# Patient Record
Sex: FEMALE | Race: WHITE | NOT HISPANIC OR LATINO | Employment: OTHER | URBAN - METROPOLITAN AREA
[De-identification: names, ages, dates, MRNs, and addresses within clinical notes are randomized per-mention and may not be internally consistent; named-entity substitution may affect disease eponyms.]

---

## 2023-03-10 DIAGNOSIS — G89.29 CHRONIC PAIN OF RIGHT KNEE: ICD-10-CM

## 2023-03-10 DIAGNOSIS — I10 PRIMARY HYPERTENSION: Primary | ICD-10-CM

## 2023-03-10 DIAGNOSIS — M25.561 CHRONIC PAIN OF RIGHT KNEE: ICD-10-CM

## 2023-03-10 DIAGNOSIS — M19.041 PRIMARY OSTEOARTHRITIS OF BOTH HANDS: ICD-10-CM

## 2023-03-10 DIAGNOSIS — M19.042 PRIMARY OSTEOARTHRITIS OF BOTH HANDS: ICD-10-CM

## 2023-03-11 PROBLEM — E55.9 VITAMIN D DEFICIENCY: Status: ACTIVE | Noted: 2023-03-11

## 2023-03-11 PROBLEM — R50.9 FEVER: Status: ACTIVE | Noted: 2023-03-11

## 2023-03-11 PROBLEM — I10 HTN (HYPERTENSION): Status: ACTIVE | Noted: 2023-03-11

## 2023-03-11 PROBLEM — M19.042 OSTEOARTHRITIS OF HANDS, BILATERAL: Status: ACTIVE | Noted: 2023-03-11

## 2023-03-11 PROBLEM — E11.40 CONTROLLED TYPE 2 DIABETES MELLITUS WITH DIABETIC NEUROPATHY, WITHOUT LONG-TERM CURRENT USE OF INSULIN (MULTI): Status: ACTIVE | Noted: 2023-03-11

## 2023-03-11 PROBLEM — E78.5 HYPERLIPIDEMIA ASSOCIATED WITH TYPE 2 DIABETES MELLITUS (MULTI): Status: ACTIVE | Noted: 2023-03-11

## 2023-03-11 PROBLEM — I25.10 CORONARY ARTERY DISEASE INVOLVING NATIVE CORONARY ARTERY OF NATIVE HEART WITHOUT ANGINA PECTORIS: Status: ACTIVE | Noted: 2023-03-11

## 2023-03-11 PROBLEM — E11.69 HYPERLIPIDEMIA ASSOCIATED WITH TYPE 2 DIABETES MELLITUS (MULTI): Status: ACTIVE | Noted: 2023-03-11

## 2023-03-11 PROBLEM — M25.561 RIGHT KNEE PAIN: Status: ACTIVE | Noted: 2023-03-11

## 2023-03-11 PROBLEM — M79.7 FIBROMYALGIA: Status: ACTIVE | Noted: 2023-03-11

## 2023-03-11 PROBLEM — T45.1X5A NEUROPATHY DUE TO CHEMOTHERAPEUTIC DRUG (MULTI): Status: ACTIVE | Noted: 2023-03-11

## 2023-03-11 PROBLEM — E87.5 HYPERKALEMIA: Status: ACTIVE | Noted: 2023-03-11

## 2023-03-11 PROBLEM — M19.041 OSTEOARTHRITIS OF HANDS, BILATERAL: Status: ACTIVE | Noted: 2023-03-11

## 2023-03-11 PROBLEM — R06.2 WHEEZING: Status: ACTIVE | Noted: 2023-03-11

## 2023-03-11 PROBLEM — G62.0 NEUROPATHY DUE TO CHEMOTHERAPEUTIC DRUG (MULTI): Status: ACTIVE | Noted: 2023-03-11

## 2023-03-11 RX ORDER — BISOPROLOL FUMARATE AND HYDROCHLOROTHIAZIDE 2.5; 6.25 MG/1; MG/1
TABLET ORAL
Qty: 30 TABLET | Refills: 0 | Status: SHIPPED | OUTPATIENT
Start: 2023-03-11 | End: 2023-04-07 | Stop reason: SDUPTHER

## 2023-03-11 RX ORDER — TIZANIDINE 4 MG/1
TABLET ORAL
Qty: 180 TABLET | Refills: 0 | Status: SHIPPED | OUTPATIENT
Start: 2023-03-11 | End: 2023-04-07 | Stop reason: SDUPTHER

## 2023-03-13 DIAGNOSIS — I10 PRIMARY HYPERTENSION: ICD-10-CM

## 2023-03-13 DIAGNOSIS — I10 HYPERTENSION, UNSPECIFIED TYPE: ICD-10-CM

## 2023-03-13 RX ORDER — BISOPROLOL FUMARATE AND HYDROCHLOROTHIAZIDE 2.5; 6.25 MG/1; MG/1
1 TABLET ORAL DAILY
Qty: 30 TABLET | Refills: 0 | Status: CANCELLED | OUTPATIENT
Start: 2023-03-13

## 2023-03-13 RX ORDER — GABAPENTIN 300 MG/1
300 CAPSULE ORAL
COMMUNITY
Start: 2023-03-06 | End: 2023-04-07 | Stop reason: SDUPTHER

## 2023-03-13 RX ORDER — LISINOPRIL 10 MG/1
10 TABLET ORAL DAILY
Qty: 30 TABLET | Refills: 11 | Status: SHIPPED | OUTPATIENT
Start: 2023-03-13 | End: 2023-07-10 | Stop reason: SDUPTHER

## 2023-03-13 RX ORDER — GLIMEPIRIDE 2 MG/1
2 TABLET ORAL
COMMUNITY
Start: 2023-03-08 | End: 2023-04-07 | Stop reason: SDUPTHER

## 2023-04-07 ENCOUNTER — TELEPHONE (OUTPATIENT)
Dept: PRIMARY CARE | Facility: CLINIC | Age: 73
End: 2023-04-07
Payer: COMMERCIAL

## 2023-04-07 DIAGNOSIS — G89.29 CHRONIC PAIN OF RIGHT KNEE: ICD-10-CM

## 2023-04-07 DIAGNOSIS — E11.40 CONTROLLED TYPE 2 DIABETES MELLITUS WITH DIABETIC NEUROPATHY, WITHOUT LONG-TERM CURRENT USE OF INSULIN (MULTI): Primary | ICD-10-CM

## 2023-04-07 DIAGNOSIS — M19.042 PRIMARY OSTEOARTHRITIS OF BOTH HANDS: ICD-10-CM

## 2023-04-07 DIAGNOSIS — M25.561 CHRONIC PAIN OF RIGHT KNEE: ICD-10-CM

## 2023-04-07 DIAGNOSIS — I10 PRIMARY HYPERTENSION: ICD-10-CM

## 2023-04-07 DIAGNOSIS — M19.041 PRIMARY OSTEOARTHRITIS OF BOTH HANDS: ICD-10-CM

## 2023-04-07 RX ORDER — TIZANIDINE 4 MG/1
TABLET ORAL
Qty: 180 TABLET | Refills: 0 | Status: SHIPPED | OUTPATIENT
Start: 2023-04-07 | End: 2023-05-30

## 2023-04-07 RX ORDER — BISOPROLOL FUMARATE AND HYDROCHLOROTHIAZIDE 2.5; 6.25 MG/1; MG/1
1 TABLET ORAL DAILY
Qty: 90 TABLET | Refills: 2 | Status: SHIPPED | OUTPATIENT
Start: 2023-04-07 | End: 2023-12-20 | Stop reason: SDUPTHER

## 2023-04-07 RX ORDER — GLIMEPIRIDE 2 MG/1
TABLET ORAL
Qty: 60 TABLET | Refills: 0 | Status: SHIPPED | OUTPATIENT
Start: 2023-04-07 | End: 2023-05-30

## 2023-04-07 RX ORDER — GABAPENTIN 300 MG/1
CAPSULE ORAL
Qty: 90 CAPSULE | Refills: 0 | Status: SHIPPED | OUTPATIENT
Start: 2023-04-07 | End: 2023-05-18

## 2023-04-07 NOTE — TELEPHONE ENCOUNTER
Rx Refill Request Telephone Encounter    Name:  Antoine Alemanroderick  :  [id number]  Medication Name:  bisoprolol  2.5mg          Specific Pharmacy location:  [facility]

## 2023-06-05 ENCOUNTER — PATIENT OUTREACH (OUTPATIENT)
Dept: PRIMARY CARE | Facility: CLINIC | Age: 73
End: 2023-06-05
Payer: COMMERCIAL

## 2023-06-05 RX ORDER — MORPHINE SULFATE 30 MG/1
TABLET, FILM COATED, EXTENDED RELEASE ORAL EVERY 8 HOURS
COMMUNITY
Start: 2023-05-24 | End: 2023-06-22

## 2023-06-05 RX ORDER — SIMVASTATIN 40 MG/1
TABLET, FILM COATED ORAL
COMMUNITY
End: 2023-07-10

## 2023-06-05 RX ORDER — AMOXICILLIN AND CLAVULANATE POTASSIUM 875; 125 MG/1; MG/1
TABLET, FILM COATED ORAL 2 TIMES DAILY
COMMUNITY
Start: 2023-06-02 | End: 2023-06-07 | Stop reason: ALTCHOICE

## 2023-06-05 RX ORDER — OXYCODONE HYDROCHLORIDE 10 MG/1
10-20 TABLET ORAL EVERY 6 HOURS PRN
COMMUNITY
Start: 2023-05-03 | End: 2023-11-09 | Stop reason: SDUPTHER

## 2023-06-05 RX ORDER — METRONIDAZOLE 500 MG/1
TABLET ORAL
COMMUNITY
Start: 2023-06-02 | End: 2023-06-07 | Stop reason: ALTCHOICE

## 2023-06-05 RX ORDER — BLOOD-GLUCOSE METER
1 KIT MISCELLANEOUS 2 TIMES DAILY
COMMUNITY
Start: 2023-05-28 | End: 2023-09-19

## 2023-06-05 NOTE — PROGRESS NOTES
Discharge Facility: [facility]   Discharge Diagnosis:  Final Discharge Diagnoses: Cholelithiasis  Acute diverticulitis  UTI (urinary tract infection)     Admission Date:5-30-23  Discharge Date:  6-2-23    PCP Appointment Date: 6-7-23  Specialist Appointment Date:   6-5-23 @ 1300    Physician/Dept/Service:   Deanna Gar MD          Reason for Referral:   Follow-up of gallstones          Location:   [facility]  [address] [place] [address];   [address]  [place] [address]  Hospital Encounter and Summary: Linked   See discharge assessment below for further details  Engagement  Call Start Time: 0924 (6/5/2023  9:25 AM)    Medications  Medications reviewed with patient/caregiver?: Yes (6/5/2023  9:25 AM)  Is the patient having any side effects they believe may be caused by any medication additions or changes?: No (6/5/2023  9:25 AM)  Does the patient have all medications ordered at discharge?: Yes (6/5/2023  9:25 AM)  Care Management Interventions: No intervention needed (6/5/2023  9:25 AM)  Is the patient taking all medications as directed (includes completed medication regime)?: Yes (6/5/2023  9:25 AM)  Care Management Interventions: Provided patient education (6/5/2023  9:25 AM)    Appointments  Does the patient have a primary care provider?: Yes (6/5/2023  9:25 AM)  Care Management Interventions: Verified appointment date/time/provider (6/5/2023  9:25 AM)  Has the patient kept scheduled appointments due by today?: Yes (6/5/2023  9:25 AM)  Care Management Interventions: Advised patient to keep appointment (6/5/2023  9:25 AM)    Self Management  Has home health visited the patient within 72 hours of discharge?: Not applicable (6/5/2023  9:25 AM)    Patient Teaching  Does the patient have access to their discharge instructions?: Yes (6/5/2023  9:25 AM)  Care Management Interventions: Reviewed instructions with patient (6/5/2023  9:25 AM)  What is the patient's perception of their health  status since discharge?: Improving (6/5/2023  9:25 AM)  Is the patient/caregiver able to teach back the hierarchy of who to call/visit for symptoms/problems? PCP, Specialist, Home Health nurse, Urgent Care, ED, 911: Yes (6/5/2023  9:25 AM)      Damian Lawrence LPN

## 2023-06-07 ENCOUNTER — OFFICE VISIT (OUTPATIENT)
Dept: PRIMARY CARE | Facility: CLINIC | Age: 73
End: 2023-06-07
Payer: MEDICARE

## 2023-06-07 VITALS
SYSTOLIC BLOOD PRESSURE: 102 MMHG | OXYGEN SATURATION: 93 % | BODY MASS INDEX: 33.52 KG/M2 | DIASTOLIC BLOOD PRESSURE: 60 MMHG | RESPIRATION RATE: 18 BRPM | HEIGHT: 71 IN | WEIGHT: 239.4 LBS | HEART RATE: 74 BPM

## 2023-06-07 DIAGNOSIS — R10.11 RIGHT UPPER QUADRANT ABDOMINAL PAIN: ICD-10-CM

## 2023-06-07 DIAGNOSIS — B02.9 HERPES ZOSTER WITHOUT COMPLICATION: Primary | ICD-10-CM

## 2023-06-07 DIAGNOSIS — I10 PRIMARY HYPERTENSION: ICD-10-CM

## 2023-06-07 DIAGNOSIS — E11.40 CONTROLLED TYPE 2 DIABETES MELLITUS WITH DIABETIC NEUROPATHY, WITHOUT LONG-TERM CURRENT USE OF INSULIN (MULTI): ICD-10-CM

## 2023-06-07 PROCEDURE — 99496 TRANSJ CARE MGMT HIGH F2F 7D: CPT | Performed by: STUDENT IN AN ORGANIZED HEALTH CARE EDUCATION/TRAINING PROGRAM

## 2023-06-07 PROCEDURE — 1160F RVW MEDS BY RX/DR IN RCRD: CPT | Performed by: STUDENT IN AN ORGANIZED HEALTH CARE EDUCATION/TRAINING PROGRAM

## 2023-06-07 PROCEDURE — 4010F ACE/ARB THERAPY RXD/TAKEN: CPT | Performed by: STUDENT IN AN ORGANIZED HEALTH CARE EDUCATION/TRAINING PROGRAM

## 2023-06-07 PROCEDURE — 3074F SYST BP LT 130 MM HG: CPT | Performed by: STUDENT IN AN ORGANIZED HEALTH CARE EDUCATION/TRAINING PROGRAM

## 2023-06-07 PROCEDURE — 1170F FXNL STATUS ASSESSED: CPT | Performed by: STUDENT IN AN ORGANIZED HEALTH CARE EDUCATION/TRAINING PROGRAM

## 2023-06-07 PROCEDURE — 1159F MED LIST DOCD IN RCRD: CPT | Performed by: STUDENT IN AN ORGANIZED HEALTH CARE EDUCATION/TRAINING PROGRAM

## 2023-06-07 PROCEDURE — 1157F ADVNC CARE PLAN IN RCRD: CPT | Performed by: STUDENT IN AN ORGANIZED HEALTH CARE EDUCATION/TRAINING PROGRAM

## 2023-06-07 PROCEDURE — 1036F TOBACCO NON-USER: CPT | Performed by: STUDENT IN AN ORGANIZED HEALTH CARE EDUCATION/TRAINING PROGRAM

## 2023-06-07 PROCEDURE — 3078F DIAST BP <80 MM HG: CPT | Performed by: STUDENT IN AN ORGANIZED HEALTH CARE EDUCATION/TRAINING PROGRAM

## 2023-06-07 RX ORDER — ACYCLOVIR 50 MG/G
1 OINTMENT TOPICAL
Qty: 1 G | Refills: 0 | Status: SHIPPED | OUTPATIENT
Start: 2023-06-07 | End: 2023-06-11

## 2023-06-07 ASSESSMENT — ENCOUNTER SYMPTOMS
POLYPHAGIA: 0
SHORTNESS OF BREATH: 0
OCCASIONAL FEELINGS OF UNSTEADINESS: 1
BLOOD IN STOOL: 0
COUGH: 0
HEMATURIA: 0
SINUS PAIN: 0
FATIGUE: 0
LOSS OF SENSATION IN FEET: 1
NERVOUS/ANXIOUS: 0
NAUSEA: 0
DEPRESSION: 0
DIZZINESS: 0
CONSTIPATION: 0
ABDOMINAL PAIN: 0
WEAKNESS: 0
HEADACHES: 0
EYE DISCHARGE: 0
FEVER: 0
TROUBLE SWALLOWING: 0
WHEEZING: 0
WOUND: 0
ACTIVITY CHANGE: 0
CHILLS: 0
ABDOMINAL DISTENTION: 0
CONFUSION: 0
POLYDIPSIA: 0
SLEEP DISTURBANCE: 0

## 2023-06-07 ASSESSMENT — ACTIVITIES OF DAILY LIVING (ADL)
TOILETING: INDEPENDENT
BATHING: INDEPENDENT
HEARING - RIGHT EAR: FUNCTIONAL
ADEQUATE_TO_COMPLETE_ADL: YES
PATIENT'S MEMORY ADEQUATE TO SAFELY COMPLETE DAILY ACTIVITIES?: YES
HEARING - LEFT EAR: FUNCTIONAL
DRESSING YOURSELF: INDEPENDENT
ADEQUATE_TO_COMPLETE_ADL: YES
FEEDING YOURSELF: INDEPENDENT
JUDGMENT_ADEQUATE_SAFELY_COMPLETE_DAILY_ACTIVITIES: YES
WALKS IN HOME: INDEPENDENT
GROOMING: INDEPENDENT

## 2023-06-07 ASSESSMENT — ANXIETY QUESTIONNAIRES
1. FEELING NERVOUS, ANXIOUS, OR ON EDGE: NOT AT ALL
5. BEING SO RESTLESS THAT IT IS HARD TO SIT STILL: NOT AT ALL
2. NOT BEING ABLE TO STOP OR CONTROL WORRYING: NOT AT ALL
6. BECOMING EASILY ANNOYED OR IRRITABLE: NOT AT ALL
7. FEELING AFRAID AS IF SOMETHING AWFUL MIGHT HAPPEN: NOT AT ALL
GAD7 TOTAL SCORE: 0
4. TROUBLE RELAXING: NOT AT ALL
3. WORRYING TOO MUCH ABOUT DIFFERENT THINGS: NOT AT ALL

## 2023-06-07 ASSESSMENT — PATIENT HEALTH QUESTIONNAIRE - PHQ9
2. FEELING DOWN, DEPRESSED OR HOPELESS: NOT AT ALL
1. LITTLE INTEREST OR PLEASURE IN DOING THINGS: NOT AT ALL
SUM OF ALL RESPONSES TO PHQ9 QUESTIONS 1 AND 2: 0

## 2023-06-07 ASSESSMENT — PAIN SCALES - GENERAL: PAINLEVEL: 6

## 2023-06-07 NOTE — PATIENT INSTRUCTIONS
Rash. Prescribe an ointment. Let us know if helps    Follow up with general surgery  If you need anything or have any questions, please call the clinic at [phone]     Follow up as scheduled in 3  month or sooner

## 2023-06-07 NOTE — PROGRESS NOTES
"Patient: Antoine Romero  : 1950  PCP: Diallo Kimbrough DO  MRN: [id number]  Program: No linked episodes     Antoine Romero is a 72 y.o. female presenting today for follow-up after being discharged from the hospital 7 days ago. The main problem requiring admission was abdominal pain. The discharge summary and/or Transitional Care Management documentation was reviewed. Medication reconciliation was performed as indicated via the \"Barry as Reviewed\" timestamp.     Antoine Romero was contacted by Transitional Care Management services two days after her discharge. This encounter and supporting documentation was reviewed.    The complexity of medical decision making for this patient's transitional care is moderate.    Physical Exam  Vitals and nursing note reviewed.   Constitutional:       Appearance: Normal appearance. She is normal weight.   HENT:      Head: Normocephalic and atraumatic.      Right Ear: Tympanic membrane normal.      Left Ear: Tympanic membrane normal.      Mouth/Throat:      Mouth: Mucous membranes are dry.   Eyes:      Extraocular Movements: Extraocular movements intact.      Conjunctiva/sclera: Conjunctivae normal.   Cardiovascular:      Rate and Rhythm: Normal rate.      Pulses: Normal pulses.   Pulmonary:      Effort: Pulmonary effort is normal. No respiratory distress.      Breath sounds: Normal breath sounds.   Abdominal:      General: Bowel sounds are normal. There is no distension.      Palpations: Abdomen is soft. There is no mass.   Musculoskeletal:         General: Normal range of motion.      Cervical back: Normal range of motion and neck supple.   Skin:     General: Skin is warm and dry.   Neurological:      General: No focal deficit present.      Mental Status: She is alert. Mental status is at baseline.   Psychiatric:         Mood and Affect: Mood normal.         Assessment/Plan     TCM/post hospital follow up/ Right upper quadrant pain. Cholecystectomy scheduled     Zoster rash. " Patient is immuno compromised.  Will prescribe a trial of acyclovir ointment. Patient is on gabapentin which will help with the neuropathic pain     HTN. BP is stable. Continue current management    T2d. A1c of 8.3%. she is counseled on diet and to cut back on soda and sugary drinks      Follow up in 3 months for medicare wellness    Problem List Items Addressed This Visit       Controlled type 2 diabetes mellitus with diabetic neuropathy, without long-term current use of insulin (CMS/[facility])    Relevant Orders    Follow Up In [facility] - PCP    HTN (hypertension)    Relevant Orders    Follow Up In [facility] - PCP     Other Visit Diagnoses       Herpes zoster without complication    -  Primary    Relevant Medications    acyclovir (Zovirax) 5 % ointment    Other Relevant Orders    Follow Up In [facility] - PCP    Right upper quadrant abdominal pain        Relevant Orders    Follow Up In [facility] - PCP            Review of Systems   Constitutional:  Negative for activity change, chills, fatigue and fever.   HENT:  Negative for congestion, ear discharge, sinus pain and trouble swallowing.    Eyes:  Negative for discharge and visual disturbance.   Respiratory:  Negative for cough, shortness of breath and wheezing.    Cardiovascular:  Negative for chest pain and leg swelling.   Gastrointestinal:  Negative for abdominal distention, abdominal pain, blood in stool, constipation and nausea.   Endocrine: Negative for polydipsia and polyphagia.   Genitourinary:  Negative for hematuria.   Musculoskeletal:  Negative for gait problem.   Skin:  Negative for wound.   Allergic/Immunologic: Negative for food allergies.   Neurological:  Negative for dizziness, weakness and headaches.   Psychiatric/Behavioral:  Negative for confusion, sleep disturbance and suicidal ideas. The patient is not nervous/anxious.        No family history on file.    Engagement  Call Start Time: 0924 (6/5/2023   9:25 AM)    Medications  Medications reviewed with patient/caregiver?: Yes (6/5/2023  9:25 AM)  Is the patient having any side effects they believe may be caused by any medication additions or changes?: No (6/5/2023  9:25 AM)  Does the patient have all medications ordered at discharge?: Yes (6/5/2023  9:25 AM)  Care Management Interventions: No intervention needed (6/5/2023  9:25 AM)  Is the patient taking all medications as directed (includes completed medication regime)?: Yes (6/5/2023  9:25 AM)  Care Management Interventions: Provided patient education (6/5/2023  9:25 AM)    Appointments  Does the patient have a primary care provider?: Yes (6/5/2023  9:25 AM)  Care Management Interventions: Verified appointment date/time/provider (6/5/2023  9:25 AM)  Has the patient kept scheduled appointments due by today?: Yes (6/5/2023  9:25 AM)  Care Management Interventions: Advised patient to keep appointment (6/5/2023  9:25 AM)    Self Management  Has home health visited the patient within 72 hours of discharge?: Not applicable (6/5/2023  9:25 AM)    Patient Teaching  Does the patient have access to their discharge instructions?: Yes (6/5/2023  9:25 AM)  Care Management Interventions: Reviewed instructions with patient (6/5/2023  9:25 AM)  What is the patient's perception of their health status since discharge?: Improving (6/5/2023  9:25 AM)  Is the patient/caregiver able to teach back the hierarchy of who to call/visit for symptoms/problems? PCP, Specialist, Home Health nurse, Urgent Care, ED, 911: Yes (6/5/2023  9:25 AM)        No follow-ups on file.

## 2023-06-12 ENCOUNTER — PATIENT OUTREACH (OUTPATIENT)
Dept: PRIMARY CARE | Facility: CLINIC | Age: 73
End: 2023-06-12
Payer: COMMERCIAL

## 2023-06-12 NOTE — PROGRESS NOTES
Unable to reach patient for call back after patient's follow up appointment with PCP.   KANDYM with call back number for patient to call if needed   If no voicemail available call attempts x 2 were made to contact the patient to assist with any questions or concerns patient may have.

## 2023-07-06 ENCOUNTER — PATIENT OUTREACH (OUTPATIENT)
Dept: PRIMARY CARE | Facility: CLINIC | Age: 73
End: 2023-07-06
Payer: COMMERCIAL

## 2023-07-06 NOTE — PROGRESS NOTES
Discharge Facility: [facility]  Discharge Diagnosis:   Final Discharge Diagnoses: Ovarian cancer, acute stroke left MCA distribution, HTN, chronic pain, DM 2     Admission Date: 7-1-23  Discharge Date: 7-5-23    PCP Appointment Date:7-10-23  Specialist Appointment Date:   Follow Up Appointments:    Follow-Up Appointment 01:           Physician/Dept/Service:   Dr. Ho Byrd          Reason for Referral:   Neurologist          Call to Schedule in:   3 weeks,  Call for appointment      Hospital Encounter and Summary: Linked   See discharge assessment below for further details  Engagement  Call Start Time: 1400 (7/6/2023  2:01 PM)    Medications  Medications reviewed with patient/caregiver?: Yes (7/6/2023  2:01 PM)  Is the patient having any side effects they believe may be caused by any medication additions or changes?: No (7/6/2023  2:01 PM)  Does the patient have all medications ordered at discharge?: Yes (7/6/2023  2:01 PM)  Is the patient taking all medications as directed (includes completed medication regime)?: Yes (7/6/2023  2:01 PM)  Care Management Interventions: Provided patient education (7/6/2023  2:01 PM)    Appointments  Does the patient have a primary care provider?: Yes (7/6/2023  2:01 PM)  Care Management Interventions: Verified appointment date/time/provider (7/6/2023  2:01 PM)  Has the patient kept scheduled appointments due by today?: Yes (7/6/2023  2:01 PM)  Care Management Interventions: Advised patient to keep appointment (7/6/2023  2:01 PM)    Self Management  Has home health visited the patient within 72 hours of discharge?: Not applicable (7/6/2023  2:01 PM)    Patient Teaching  Does the patient have access to their discharge instructions?: Yes (7/6/2023  2:01 PM)  What is the patient's perception of their health status since discharge?: Improving (7/6/2023  2:01 PM)  Is the patient/caregiver able to teach back the hierarchy of who to call/visit for symptoms/problems? PCP, Specialist, Home  Health nurse, Urgent Care, ED, 911: Yes (7/6/2023  2:01 PM)      Damian Lawrence LPN

## 2023-07-10 ENCOUNTER — OFFICE VISIT (OUTPATIENT)
Dept: PRIMARY CARE | Facility: CLINIC | Age: 73
End: 2023-07-10
Payer: MEDICARE

## 2023-07-10 VITALS
OXYGEN SATURATION: 98 % | RESPIRATION RATE: 16 BRPM | WEIGHT: 228 LBS | BODY MASS INDEX: 31.92 KG/M2 | HEIGHT: 71 IN | HEART RATE: 86 BPM | DIASTOLIC BLOOD PRESSURE: 60 MMHG | SYSTOLIC BLOOD PRESSURE: 100 MMHG

## 2023-07-10 DIAGNOSIS — I10 HYPERTENSION, UNSPECIFIED TYPE: ICD-10-CM

## 2023-07-10 DIAGNOSIS — I63.512 ACUTE ISCHEMIC LEFT MCA STROKE (MULTI): Primary | ICD-10-CM

## 2023-07-10 DIAGNOSIS — M19.041 PRIMARY OSTEOARTHRITIS OF BOTH HANDS: ICD-10-CM

## 2023-07-10 DIAGNOSIS — G89.29 CHRONIC PAIN OF RIGHT KNEE: ICD-10-CM

## 2023-07-10 DIAGNOSIS — M19.042 PRIMARY OSTEOARTHRITIS OF BOTH HANDS: ICD-10-CM

## 2023-07-10 DIAGNOSIS — E11.40 CONTROLLED TYPE 2 DIABETES MELLITUS WITH DIABETIC NEUROPATHY, WITHOUT LONG-TERM CURRENT USE OF INSULIN (MULTI): ICD-10-CM

## 2023-07-10 DIAGNOSIS — M25.561 CHRONIC PAIN OF RIGHT KNEE: ICD-10-CM

## 2023-07-10 PROCEDURE — 99496 TRANSJ CARE MGMT HIGH F2F 7D: CPT | Performed by: STUDENT IN AN ORGANIZED HEALTH CARE EDUCATION/TRAINING PROGRAM

## 2023-07-10 PROCEDURE — 1159F MED LIST DOCD IN RCRD: CPT | Performed by: STUDENT IN AN ORGANIZED HEALTH CARE EDUCATION/TRAINING PROGRAM

## 2023-07-10 PROCEDURE — 1125F AMNT PAIN NOTED PAIN PRSNT: CPT | Performed by: STUDENT IN AN ORGANIZED HEALTH CARE EDUCATION/TRAINING PROGRAM

## 2023-07-10 PROCEDURE — 3078F DIAST BP <80 MM HG: CPT | Performed by: STUDENT IN AN ORGANIZED HEALTH CARE EDUCATION/TRAINING PROGRAM

## 2023-07-10 PROCEDURE — 1036F TOBACCO NON-USER: CPT | Performed by: STUDENT IN AN ORGANIZED HEALTH CARE EDUCATION/TRAINING PROGRAM

## 2023-07-10 PROCEDURE — 1160F RVW MEDS BY RX/DR IN RCRD: CPT | Performed by: STUDENT IN AN ORGANIZED HEALTH CARE EDUCATION/TRAINING PROGRAM

## 2023-07-10 PROCEDURE — 1157F ADVNC CARE PLAN IN RCRD: CPT | Performed by: STUDENT IN AN ORGANIZED HEALTH CARE EDUCATION/TRAINING PROGRAM

## 2023-07-10 PROCEDURE — 4010F ACE/ARB THERAPY RXD/TAKEN: CPT | Performed by: STUDENT IN AN ORGANIZED HEALTH CARE EDUCATION/TRAINING PROGRAM

## 2023-07-10 PROCEDURE — 3051F HG A1C>EQUAL 7.0%<8.0%: CPT | Performed by: STUDENT IN AN ORGANIZED HEALTH CARE EDUCATION/TRAINING PROGRAM

## 2023-07-10 PROCEDURE — 3074F SYST BP LT 130 MM HG: CPT | Performed by: STUDENT IN AN ORGANIZED HEALTH CARE EDUCATION/TRAINING PROGRAM

## 2023-07-10 RX ORDER — MELOXICAM 15 MG/1
15 TABLET ORAL DAILY
Qty: 90 TABLET | Refills: 1 | Status: CANCELLED | OUTPATIENT
Start: 2023-07-10

## 2023-07-10 RX ORDER — GABAPENTIN 300 MG/1
300 CAPSULE ORAL 3 TIMES DAILY
Qty: 90 CAPSULE | Refills: 0 | Status: SHIPPED | OUTPATIENT
Start: 2023-07-10 | End: 2023-10-02 | Stop reason: SDUPTHER

## 2023-07-10 RX ORDER — MELOXICAM 15 MG/1
15 TABLET ORAL DAILY
COMMUNITY
Start: 2023-04-02 | End: 2023-07-10

## 2023-07-10 RX ORDER — ATORVASTATIN CALCIUM 80 MG/1
80 TABLET, FILM COATED ORAL
COMMUNITY
Start: 2023-07-05 | End: 2023-07-10 | Stop reason: SDUPTHER

## 2023-07-10 RX ORDER — LISINOPRIL 5 MG/1
5 TABLET ORAL DAILY
Qty: 90 TABLET | Refills: 0 | Status: SHIPPED | OUTPATIENT
Start: 2023-07-10 | End: 2023-09-19 | Stop reason: SDUPTHER

## 2023-07-10 RX ORDER — GLIMEPIRIDE 2 MG/1
TABLET ORAL
Qty: 90 TABLET | Refills: 1 | Status: SHIPPED | OUTPATIENT
Start: 2023-07-10 | End: 2023-08-28

## 2023-07-10 RX ORDER — ATORVASTATIN CALCIUM 80 MG/1
80 TABLET, FILM COATED ORAL NIGHTLY
Qty: 90 TABLET | Refills: 1 | Status: SHIPPED | OUTPATIENT
Start: 2023-07-10 | End: 2023-12-20 | Stop reason: SDUPTHER

## 2023-07-10 ASSESSMENT — ENCOUNTER SYMPTOMS
ACTIVITY CHANGE: 0
BLOOD IN STOOL: 0
WHEEZING: 0
SINUS PAIN: 0
DIZZINESS: 0
SHORTNESS OF BREATH: 0
CONSTIPATION: 0
EYE DISCHARGE: 0
NERVOUS/ANXIOUS: 0
SLEEP DISTURBANCE: 0
WEAKNESS: 0
FATIGUE: 0
HEMATURIA: 0
POLYPHAGIA: 0
CHILLS: 0
COUGH: 0
FEVER: 0
ABDOMINAL DISTENTION: 0
HEADACHES: 0
POLYDIPSIA: 0
WOUND: 0
CONFUSION: 0
NAUSEA: 0
ABDOMINAL PAIN: 0
TROUBLE SWALLOWING: 0

## 2023-07-10 NOTE — PATIENT INSTRUCTIONS
It was nice meeting you today.      Follow up with neurology    Follow up with hematology and oncology    Stop  meloxicam     Start taking lisinopril 5 mg daily      If you need anything or have any questions, please call the clinic at [phone]     Follow up as scheduled

## 2023-07-10 NOTE — PROGRESS NOTES
"Patient: Antoine Romero  : 1950  PCP: Diallo Kimbrough DO  MRN: [id number]  Program: No linked episodes     Antoine Romero is a 72 y.o. female presenting today for follow-up after being discharged from the hospital 5 days ago.     The main problem requiring admission was left MCA stroke. She present to the ER on 2023 with aphasia and was found to have left MCA infarct on CT and MRI of the brain.  The discharge summary and/or Transitional Care Management documentation was reviewed. Medication reconciliation was performed as indicated via the \"Barry as Reviewed\" timestamp.       Antoine Romero was contacted by Transitional Care Management services two days after her discharge. This encounter and supporting documentation was reviewed.    The complexity of medical decision making for this patient's transitional care is moderate.    Notable changes. Simvastatin switched to atorvastatin. She is started on apixaban    Physical Exam  Vitals and nursing note reviewed.   Constitutional:       Appearance: Normal appearance. She is normal weight.   HENT:      Head: Normocephalic and atraumatic.      Right Ear: External ear normal.      Left Ear: External ear normal.      Mouth/Throat:      Mouth: Mucous membranes are dry.   Eyes:      Extraocular Movements: Extraocular movements intact.      Conjunctiva/sclera: Conjunctivae normal.   Cardiovascular:      Rate and Rhythm: Normal rate.      Pulses: Normal pulses.   Pulmonary:      Effort: Pulmonary effort is normal. No respiratory distress.      Breath sounds: Normal breath sounds.   Abdominal:      General: Bowel sounds are normal. There is no distension.      Palpations: Abdomen is soft. There is no mass.   Musculoskeletal:         General: Normal range of motion.      Cervical back: Normal range of motion and neck supple.   Skin:     General: Skin is warm and dry.   Neurological:      General: No focal deficit present.      Mental Status: She is alert. Mental status is " at baseline.   Psychiatric:         Mood and Affect: Mood normal.         Assessment/Plan     Acute MCA stroke.  Stable.  We will discontinue meloxicam.  She is started on apixaban 5 mg BID while in the hospital. We will continue current management. She will follow-up with neurology and oncology to determine duration of anticoagulation.    Hypertension.  Blood pressure today is well controlled at 100/60.  We will decrease lisinopril from 10 mg to 5 mg daily.  Continue bisoprolol hydrochlorothiazide at current dose.  Reviewed BMP which was within normal limit.  Kidney function is normal.    T2DM. Stable. Improved to 7.2%. continue glimepiride      Osteoarthritis of multiple joints.  Continue tizanidine, discontinue meloxicam due to recent stroke.    Problem List Items Addressed This Visit       Controlled type 2 diabetes mellitus with diabetic neuropathy, without long-term current use of insulin (CMS/[facility])    Relevant Medications    glimepiride (Amaryl) 2 mg tablet    Other Relevant Orders    Follow Up In [facility] - PCP - Established    HTN (hypertension)    Relevant Medications    lisinopril 5 mg tablet    Right knee pain    Relevant Medications    gabapentin (Neurontin) 300 mg capsule    Other Relevant Orders    Follow Up In [facility] - PCP - Established    Osteoarthritis of hands, bilateral    Relevant Medications    gabapentin (Neurontin) 300 mg capsule    Other Relevant Orders    Follow Up In [facility] - PCP - Established     Other Visit Diagnoses       Acute ischemic left MCA stroke (CMS/[facility])    -  Primary    Relevant Medications    atorvastatin (Lipitor) 80 mg tablet    Other Relevant Orders    Follow Up In [facility] - PCP - Established            Review of Systems   Constitutional:  Negative for activity change, chills, fatigue and fever.   HENT:  Negative for congestion, ear discharge, sinus pain and trouble swallowing.    Eyes:  Negative for discharge and  visual disturbance.   Respiratory:  Negative for cough, shortness of breath and wheezing.    Cardiovascular:  Negative for chest pain and leg swelling.   Gastrointestinal:  Negative for abdominal distention, abdominal pain, blood in stool, constipation and nausea.   Endocrine: Negative for polydipsia and polyphagia.   Genitourinary:  Negative for hematuria.   Musculoskeletal:  Negative for gait problem.   Skin:  Negative for wound.   Allergic/Immunologic: Negative for food allergies.   Neurological:  Negative for dizziness, weakness and headaches.   Psychiatric/Behavioral:  Negative for confusion, sleep disturbance and suicidal ideas. The patient is not nervous/anxious.        No family history on file.    Engagement  Call Start Time: 1400 (7/6/2023  2:01 PM)    Medications  Medications reviewed with patient/caregiver?: Yes (7/6/2023  2:01 PM)  Is the patient having any side effects they believe may be caused by any medication additions or changes?: No (7/6/2023  2:01 PM)  Does the patient have all medications ordered at discharge?: Yes (7/6/2023  2:01 PM)  Is the patient taking all medications as directed (includes completed medication regime)?: Yes (7/6/2023  2:01 PM)  Care Management Interventions: Provided patient education (7/6/2023  2:01 PM)    Appointments  Does the patient have a primary care provider?: Yes (7/6/2023  2:01 PM)  Care Management Interventions: Verified appointment date/time/provider (7/6/2023  2:01 PM)  Has the patient kept scheduled appointments due by today?: Yes (7/6/2023  2:01 PM)  Care Management Interventions: Advised patient to keep appointment (7/6/2023  2:01 PM)    Self Management  Has home health visited the patient within 72 hours of discharge?: Not applicable (7/6/2023  2:01 PM)    Patient Teaching  Does the patient have access to their discharge instructions?: Yes (7/6/2023  2:01 PM)  What is the patient's perception of their health status since discharge?: Improving (7/6/2023   2:01 PM)  Is the patient/caregiver able to teach back the hierarchy of who to call/visit for symptoms/problems? PCP, Specialist, Home Health nurse, Urgent Care, ED, 911: Yes (7/6/2023  2:01 PM)        No follow-ups on file.

## 2023-07-13 ENCOUNTER — PATIENT OUTREACH (OUTPATIENT)
Dept: PRIMARY CARE | Facility: CLINIC | Age: 73
End: 2023-07-13
Payer: COMMERCIAL

## 2023-07-13 NOTE — PROGRESS NOTES
Unable to reach patient for call back after patient's follow up appointment with PCP.   LVM with call back number for patient to call if needed   If no voicemail available call attempts x 2 were made to contact the patient to assist with any questions or concerns patient may have.   Damian Lawrence LPN

## 2023-07-14 DIAGNOSIS — E11.9 TYPE 2 DIABETES MELLITUS WITHOUT COMPLICATION, WITH LONG-TERM CURRENT USE OF INSULIN (MULTI): ICD-10-CM

## 2023-07-14 DIAGNOSIS — Z79.4 TYPE 2 DIABETES MELLITUS WITHOUT COMPLICATION, WITH LONG-TERM CURRENT USE OF INSULIN (MULTI): ICD-10-CM

## 2023-07-14 NOTE — TELEPHONE ENCOUNTER
*malaura Boyd from  home care is needing a referral for home care speech therapy for patient, are you okay for a verbal okay for this?    Deb phone- [phone]

## 2023-07-16 RX ORDER — LANCETS
EACH MISCELLANEOUS
Qty: 100 EACH | Refills: 3 | Status: SHIPPED | OUTPATIENT
Start: 2023-07-16

## 2023-07-20 ENCOUNTER — TELEPHONE (OUTPATIENT)
Dept: PRIMARY CARE | Facility: CLINIC | Age: 73
End: 2023-07-20
Payer: COMMERCIAL

## 2023-07-21 ENCOUNTER — TELEPHONE (OUTPATIENT)
Dept: PRIMARY CARE | Facility: CLINIC | Age: 73
End: 2023-07-21
Payer: COMMERCIAL

## 2023-07-21 DIAGNOSIS — R47.01 APHASIA: ICD-10-CM

## 2023-07-21 NOTE — TELEPHONE ENCOUNTER
[facility] gave verbal orders to complete mukund PETERS

## 2023-08-02 ENCOUNTER — TELEPHONE (OUTPATIENT)
Dept: PRIMARY CARE | Facility: CLINIC | Age: 73
End: 2023-08-02
Payer: COMMERCIAL

## 2023-08-04 ENCOUNTER — PATIENT OUTREACH (OUTPATIENT)
Dept: PRIMARY CARE | Facility: CLINIC | Age: 73
End: 2023-08-04
Payer: COMMERCIAL

## 2023-08-04 NOTE — PROGRESS NOTES
Unable to reach patient for one month post discharge follow up call.   LVM with call back number for patient to call if needed   If no voicemail available call attempts x 2 were made to contact the patient to assist with any questions or concerns patient may have.  Damian Lawrence LPN

## 2023-08-28 ENCOUNTER — TELEPHONE (OUTPATIENT)
Dept: PRIMARY CARE | Facility: CLINIC | Age: 73
End: 2023-08-28
Payer: COMMERCIAL

## 2023-08-28 DIAGNOSIS — E11.40 CONTROLLED TYPE 2 DIABETES MELLITUS WITH DIABETIC NEUROPATHY, WITHOUT LONG-TERM CURRENT USE OF INSULIN (MULTI): Primary | ICD-10-CM

## 2023-08-28 DIAGNOSIS — E11.40 CONTROLLED TYPE 2 DIABETES MELLITUS WITH DIABETIC NEUROPATHY, WITHOUT LONG-TERM CURRENT USE OF INSULIN (MULTI): ICD-10-CM

## 2023-08-28 PROBLEM — G89.29 CHRONIC PAIN: Status: ACTIVE | Noted: 2022-12-28

## 2023-08-28 PROBLEM — G89.29 ABDOMINAL PAIN, CHRONIC, LEFT UPPER QUADRANT: Status: ACTIVE | Noted: 2023-08-28

## 2023-08-28 PROBLEM — R42 DIZZINESS: Status: ACTIVE | Noted: 2023-08-28

## 2023-08-28 PROBLEM — K80.20 CHOLELITHIASIS: Status: ACTIVE | Noted: 2023-08-28

## 2023-08-28 PROBLEM — K52.9 COLITIS: Status: ACTIVE | Noted: 2023-08-28

## 2023-08-28 PROBLEM — K80.20 GALLSTONES: Status: ACTIVE | Noted: 2023-08-28

## 2023-08-28 PROBLEM — C57.01: Status: ACTIVE | Noted: 2023-08-28

## 2023-08-28 PROBLEM — D68.69 SECONDARY HYPERCOAGULABLE STATE (MULTI): Status: ACTIVE | Noted: 2023-08-28

## 2023-08-28 PROBLEM — R10.32 ABDOMINAL WALL PAIN IN LEFT LOWER QUADRANT: Status: ACTIVE | Noted: 2023-08-28

## 2023-08-28 PROBLEM — R22.2 CHEST WALL MASS: Status: ACTIVE | Noted: 2023-08-28

## 2023-08-28 PROBLEM — R10.12 ABDOMINAL PAIN, CHRONIC, LEFT UPPER QUADRANT: Status: ACTIVE | Noted: 2023-08-28

## 2023-08-28 PROBLEM — I63.40 CEREBROVASCULAR ACCIDENT (CVA) DUE TO EMBOLISM OF CEREBRAL ARTERY (MULTI): Status: ACTIVE | Noted: 2023-08-28

## 2023-08-28 PROBLEM — N39.0 UTI (URINARY TRACT INFECTION): Status: ACTIVE | Noted: 2023-08-28

## 2023-08-28 PROBLEM — H61.23 BILATERAL IMPACTED CERUMEN: Status: ACTIVE | Noted: 2023-08-28

## 2023-08-28 PROBLEM — K80.10 CALCULUS OF GALLBLADDER WITH CHRONIC CHOLECYSTITIS WITHOUT OBSTRUCTION: Status: ACTIVE | Noted: 2023-08-28

## 2023-08-28 PROBLEM — J20.9 ACUTE BRONCHITIS: Status: ACTIVE | Noted: 2023-08-28

## 2023-08-28 PROBLEM — M54.50 CHRONIC LOW BACK PAIN: Status: ACTIVE | Noted: 2023-08-28

## 2023-08-28 PROBLEM — R11.0 NAUSEA: Status: ACTIVE | Noted: 2023-08-28

## 2023-08-28 PROBLEM — E11.9 DM II (DIABETES MELLITUS, TYPE II), CONTROLLED (MULTI): Status: ACTIVE | Noted: 2023-08-28

## 2023-08-28 PROBLEM — I63.9: Status: ACTIVE | Noted: 2023-08-28

## 2023-08-28 PROBLEM — Z66 DO NOT RESUSCITATE STATUS: Status: ACTIVE | Noted: 2023-08-28

## 2023-08-28 PROBLEM — R47.01 EXPRESSIVE APHASIA: Status: ACTIVE | Noted: 2023-08-28

## 2023-08-28 PROBLEM — K21.9 ACID REFLUX: Status: ACTIVE | Noted: 2023-08-28

## 2023-08-28 PROBLEM — I63.9 STROKE (MULTI): Status: ACTIVE | Noted: 2023-08-28

## 2023-08-28 PROBLEM — E78.1 HYPERTRIGLYCERIDEMIA: Status: ACTIVE | Noted: 2023-08-28

## 2023-08-28 PROBLEM — R68.83 CHILLS: Status: ACTIVE | Noted: 2023-08-28

## 2023-08-28 PROBLEM — G89.29 CHRONIC LOW BACK PAIN: Status: ACTIVE | Noted: 2023-08-28

## 2023-08-28 PROBLEM — B37.0 ORAL THRUSH: Status: ACTIVE | Noted: 2023-08-28

## 2023-08-28 PROBLEM — E78.5 HLD (HYPERLIPIDEMIA): Status: ACTIVE | Noted: 2023-08-28

## 2023-08-28 PROBLEM — R29.810 FACIAL DROOP: Status: ACTIVE | Noted: 2023-08-28

## 2023-08-28 PROBLEM — D68.59: Status: ACTIVE | Noted: 2023-08-28

## 2023-08-28 PROBLEM — Q21.12 PFO (PATENT FORAMEN OVALE) (HHS-HCC): Status: ACTIVE | Noted: 2023-08-28

## 2023-08-28 RX ORDER — SENNOSIDES 8.6 MG/1
2 TABLET ORAL DAILY
COMMUNITY

## 2023-08-28 RX ORDER — GLIMEPIRIDE 2 MG/1
TABLET ORAL
Qty: 180 TABLET | Refills: 2 | Status: SHIPPED | OUTPATIENT
Start: 2023-08-28 | End: 2024-04-24

## 2023-08-28 RX ORDER — MECLIZINE HYDROCHLORIDE 25 MG/1
25 TABLET ORAL DAILY PRN
COMMUNITY
Start: 2023-01-26

## 2023-08-28 RX ORDER — INSULIN PUMP SYRINGE, 3 ML
1 EACH MISCELLANEOUS AS NEEDED
Qty: 1 EACH | Refills: 0 | Status: SHIPPED | OUTPATIENT
Start: 2023-08-28

## 2023-08-28 RX ORDER — INSULIN PUMP SYRINGE, 3 ML
1 EACH MISCELLANEOUS AS NEEDED
COMMUNITY
End: 2023-08-28 | Stop reason: SDUPTHER

## 2023-08-28 RX ORDER — QUINIDINE SULFATE 200 MG
TABLET ORAL
COMMUNITY
End: 2024-06-07 | Stop reason: ALTCHOICE

## 2023-08-28 RX ORDER — ASPIRIN 81 MG/1
81 TABLET ORAL DAILY
COMMUNITY

## 2023-08-28 RX ORDER — MORPHINE SULFATE 30 MG/1
30 TABLET, FILM COATED, EXTENDED RELEASE ORAL EVERY 8 HOURS
COMMUNITY
Start: 2023-08-22 | End: 2023-09-19 | Stop reason: ALTCHOICE

## 2023-08-28 RX ORDER — ALBUTEROL SULFATE 0.83 MG/ML
2.5 SOLUTION RESPIRATORY (INHALATION)
COMMUNITY
End: 2024-04-12 | Stop reason: ALTCHOICE

## 2023-08-28 RX ORDER — ASCORBIC ACID 500 MG
TABLET ORAL
COMMUNITY
End: 2024-04-12 | Stop reason: ALTCHOICE

## 2023-08-28 RX ORDER — ZINC GLUCONATE 50 MG
50 TABLET ORAL DAILY
COMMUNITY
End: 2024-04-12 | Stop reason: WASHOUT

## 2023-09-16 DIAGNOSIS — E11.40 CONTROLLED TYPE 2 DIABETES MELLITUS WITH DIABETIC NEUROPATHY, WITHOUT LONG-TERM CURRENT USE OF INSULIN (MULTI): Primary | ICD-10-CM

## 2023-09-19 ENCOUNTER — OFFICE VISIT (OUTPATIENT)
Dept: PRIMARY CARE | Facility: CLINIC | Age: 73
End: 2023-09-19
Payer: MEDICARE

## 2023-09-19 VITALS
WEIGHT: 230.8 LBS | OXYGEN SATURATION: 91 % | HEART RATE: 70 BPM | SYSTOLIC BLOOD PRESSURE: 118 MMHG | DIASTOLIC BLOOD PRESSURE: 60 MMHG | RESPIRATION RATE: 18 BRPM | BODY MASS INDEX: 32.31 KG/M2 | HEIGHT: 71 IN

## 2023-09-19 DIAGNOSIS — I10 HYPERTENSION, UNSPECIFIED TYPE: ICD-10-CM

## 2023-09-19 DIAGNOSIS — B37.0 ORAL THRUSH: ICD-10-CM

## 2023-09-19 DIAGNOSIS — E11.40 CONTROLLED TYPE 2 DIABETES MELLITUS WITH DIABETIC NEUROPATHY, WITHOUT LONG-TERM CURRENT USE OF INSULIN (MULTI): ICD-10-CM

## 2023-09-19 DIAGNOSIS — Z09 ENCOUNTER FOR FOLLOW-UP EXAMINATION AFTER COMPLETED TREATMENT FOR CONDITIONS OTHER THAN MALIGNANT NEOPLASM: ICD-10-CM

## 2023-09-19 DIAGNOSIS — Z12.31 ENCOUNTER FOR SCREENING MAMMOGRAM FOR MALIGNANT NEOPLASM OF BREAST: ICD-10-CM

## 2023-09-19 DIAGNOSIS — I10 PRIMARY HYPERTENSION: Primary | ICD-10-CM

## 2023-09-19 DIAGNOSIS — Z00.00 HEALTH CARE MAINTENANCE: ICD-10-CM

## 2023-09-19 PROCEDURE — 4010F ACE/ARB THERAPY RXD/TAKEN: CPT | Performed by: STUDENT IN AN ORGANIZED HEALTH CARE EDUCATION/TRAINING PROGRAM

## 2023-09-19 PROCEDURE — 1157F ADVNC CARE PLAN IN RCRD: CPT | Performed by: STUDENT IN AN ORGANIZED HEALTH CARE EDUCATION/TRAINING PROGRAM

## 2023-09-19 PROCEDURE — 3074F SYST BP LT 130 MM HG: CPT | Performed by: STUDENT IN AN ORGANIZED HEALTH CARE EDUCATION/TRAINING PROGRAM

## 2023-09-19 PROCEDURE — 1159F MED LIST DOCD IN RCRD: CPT | Performed by: STUDENT IN AN ORGANIZED HEALTH CARE EDUCATION/TRAINING PROGRAM

## 2023-09-19 PROCEDURE — 1160F RVW MEDS BY RX/DR IN RCRD: CPT | Performed by: STUDENT IN AN ORGANIZED HEALTH CARE EDUCATION/TRAINING PROGRAM

## 2023-09-19 PROCEDURE — 3008F BODY MASS INDEX DOCD: CPT | Performed by: STUDENT IN AN ORGANIZED HEALTH CARE EDUCATION/TRAINING PROGRAM

## 2023-09-19 PROCEDURE — 99214 OFFICE O/P EST MOD 30 MIN: CPT | Performed by: STUDENT IN AN ORGANIZED HEALTH CARE EDUCATION/TRAINING PROGRAM

## 2023-09-19 PROCEDURE — 90662 IIV NO PRSV INCREASED AG IM: CPT | Performed by: STUDENT IN AN ORGANIZED HEALTH CARE EDUCATION/TRAINING PROGRAM

## 2023-09-19 PROCEDURE — 3078F DIAST BP <80 MM HG: CPT | Performed by: STUDENT IN AN ORGANIZED HEALTH CARE EDUCATION/TRAINING PROGRAM

## 2023-09-19 PROCEDURE — 3051F HG A1C>EQUAL 7.0%<8.0%: CPT | Performed by: STUDENT IN AN ORGANIZED HEALTH CARE EDUCATION/TRAINING PROGRAM

## 2023-09-19 PROCEDURE — G0008 ADMIN INFLUENZA VIRUS VAC: HCPCS | Performed by: STUDENT IN AN ORGANIZED HEALTH CARE EDUCATION/TRAINING PROGRAM

## 2023-09-19 PROCEDURE — 1125F AMNT PAIN NOTED PAIN PRSNT: CPT | Performed by: STUDENT IN AN ORGANIZED HEALTH CARE EDUCATION/TRAINING PROGRAM

## 2023-09-19 PROCEDURE — 1036F TOBACCO NON-USER: CPT | Performed by: STUDENT IN AN ORGANIZED HEALTH CARE EDUCATION/TRAINING PROGRAM

## 2023-09-19 RX ORDER — CALCIUM CARBONATE 300MG(750)
400 TABLET,CHEWABLE ORAL DAILY
COMMUNITY
End: 2024-04-12 | Stop reason: ALTCHOICE

## 2023-09-19 RX ORDER — BLOOD-GLUCOSE METER
1 KIT MISCELLANEOUS 2 TIMES DAILY
Qty: 200 STRIP | Refills: 2 | Status: SHIPPED | OUTPATIENT
Start: 2023-09-19 | End: 2024-03-21 | Stop reason: SDUPTHER

## 2023-09-19 RX ORDER — NYSTATIN 100000 [USP'U]/ML
500000 SUSPENSION ORAL 4 TIMES DAILY
Qty: 80 ML | Refills: 0 | Status: SHIPPED | OUTPATIENT
Start: 2023-09-19 | End: 2023-09-23

## 2023-09-19 RX ORDER — MORPHINE SULFATE 60 MG/1
60 TABLET, FILM COATED, EXTENDED RELEASE ORAL 2 TIMES DAILY
COMMUNITY
End: 2023-10-12 | Stop reason: SDUPTHER

## 2023-09-19 RX ORDER — LISINOPRIL 5 MG/1
5 TABLET ORAL DAILY
Qty: 90 TABLET | Refills: 1 | Status: SHIPPED | OUTPATIENT
Start: 2023-09-19 | End: 2024-03-26 | Stop reason: SDUPTHER

## 2023-09-19 ASSESSMENT — ENCOUNTER SYMPTOMS
DEPRESSION: 1
WEAKNESS: 0
CHILLS: 0
OCCASIONAL FEELINGS OF UNSTEADINESS: 1
POLYDIPSIA: 0
HEMATURIA: 0
NERVOUS/ANXIOUS: 0
CONSTIPATION: 0
POLYPHAGIA: 0
SLEEP DISTURBANCE: 0
ACTIVITY CHANGE: 0
TROUBLE SWALLOWING: 0
LOSS OF SENSATION IN FEET: 0
COUGH: 0
WHEEZING: 0
FEVER: 0
SHORTNESS OF BREATH: 0
NAUSEA: 0
ABDOMINAL PAIN: 0
BLOOD IN STOOL: 0
SINUS PAIN: 0
DIZZINESS: 0
CONFUSION: 0
FATIGUE: 0
WOUND: 0
EYE DISCHARGE: 0
ABDOMINAL DISTENTION: 0
HEADACHES: 0

## 2023-09-19 ASSESSMENT — PAIN SCALES - GENERAL: PAINLEVEL: 4

## 2023-09-19 ASSESSMENT — PATIENT HEALTH QUESTIONNAIRE - PHQ9
SUM OF ALL RESPONSES TO PHQ9 QUESTIONS 1 AND 2: 0
2. FEELING DOWN, DEPRESSED OR HOPELESS: NOT AT ALL
1. LITTLE INTEREST OR PLEASURE IN DOING THINGS: NOT AT ALL

## 2023-09-19 ASSESSMENT — ANXIETY QUESTIONNAIRES
GAD7 TOTAL SCORE: 0
6. BECOMING EASILY ANNOYED OR IRRITABLE: NOT AT ALL
1. FEELING NERVOUS, ANXIOUS, OR ON EDGE: NOT AT ALL
7. FEELING AFRAID AS IF SOMETHING AWFUL MIGHT HAPPEN: NOT AT ALL
5. BEING SO RESTLESS THAT IT IS HARD TO SIT STILL: NOT AT ALL
2. NOT BEING ABLE TO STOP OR CONTROL WORRYING: NOT AT ALL
3. WORRYING TOO MUCH ABOUT DIFFERENT THINGS: NOT AT ALL
4. TROUBLE RELAXING: NOT AT ALL

## 2023-09-19 ASSESSMENT — COLUMBIA-SUICIDE SEVERITY RATING SCALE - C-SSRS
2. HAVE YOU ACTUALLY HAD ANY THOUGHTS OF KILLING YOURSELF?: NO
6. HAVE YOU EVER DONE ANYTHING, STARTED TO DO ANYTHING, OR PREPARED TO DO ANYTHING TO END YOUR LIFE?: NO
1. IN THE PAST MONTH, HAVE YOU WISHED YOU WERE DEAD OR WISHED YOU COULD GO TO SLEEP AND NOT WAKE UP?: NO

## 2023-09-19 NOTE — PATIENT INSTRUCTIONS
It was nice meeting you today.     Oral thrush. Will prescribe nystatin swish spit/swallow      Follow up with neurology     Follow up with hematology/oncology     Continue lisinopril 5 mg daily      If you need anything or have any questions, please call the clinic at [phone]

## 2023-09-19 NOTE — PROGRESS NOTES
fluSubjective   Patient ID: Antoine Romero is a 73 y.o. female who presents for Follow-up (Oncology changed pain medication).    HPI    Patient present for follow up. No issues today. Blood pressure is at goal. She is tolerating current dose of lisinopril     Vitals:  Reviewed and discussed    Review of Systems   Constitutional:  Negative for activity change, chills, fatigue and fever.   HENT:  Negative for congestion, ear discharge, sinus pain and trouble swallowing.    Eyes:  Negative for discharge and visual disturbance.   Respiratory:  Negative for cough, shortness of breath and wheezing.    Cardiovascular:  Negative for chest pain and leg swelling.   Gastrointestinal:  Negative for abdominal distention, abdominal pain, blood in stool, constipation and nausea.   Endocrine: Negative for polydipsia and polyphagia.   Genitourinary:  Negative for hematuria.   Musculoskeletal:  Negative for gait problem.   Skin:  Negative for wound.   Allergic/Immunologic: Negative for food allergies.   Neurological:  Negative for dizziness, weakness and headaches.   Psychiatric/Behavioral:  Negative for confusion, sleep disturbance and suicidal ideas. The patient is not nervous/anxious.        Objective     Physical Exam  Vitals and nursing note reviewed.   Constitutional:       Appearance: Normal appearance. She is obese.      Comments: She is on a wheel chair today, but usually ambulates with a cane   HENT:      Head: Normocephalic and atraumatic.      Right Ear: External ear normal.      Left Ear: External ear normal.      Mouth/Throat:      Mouth: Mucous membranes are dry.      Comments: Oral thrush  Eyes:      Extraocular Movements: Extraocular movements intact.      Conjunctiva/sclera: Conjunctivae normal.   Cardiovascular:      Rate and Rhythm: Normal rate.      Pulses: Normal pulses.   Pulmonary:      Effort: Pulmonary effort is normal. No respiratory distress.      Breath sounds: Normal breath sounds.   Abdominal:       General: Bowel sounds are normal. There is no distension.      Palpations: Abdomen is soft. There is no mass.   Musculoskeletal:         General: Normal range of motion.   Skin:     General: Skin is warm and dry.   Neurological:      General: No focal deficit present.      Mental Status: She is alert. Mental status is at baseline.   Psychiatric:         Mood and Affect: Mood normal.         Assessment/Plan      Hypertension.  Blood pressure today is well controlled at 118/60. Lisinopril was adjusted to 5 mg daily.  Continue bisoprolol and hydrochlorothiazide at current dose.  Reviewed BMP which was within normal limit.  Kidney function is normal.    History of  MCA stroke.  Stable.  She is tolerating apixaban 5 mg BID.  We will continue current management. She has  follow-up with neurology and oncology     Oral thrush. Will prescribe nystatin swish spit/swallow      B12 deficiency. Recommend patient take decreased dose. Her serum B12 level is 1462 pg/ml.     T2DM. Stable. Improved to 7.2%. continue glimepiride       Osteoarthritis of multiple joints.  Continue tizanidine, discontinued meloxicam due to recent stroke and due to being on apixaban    HCM. She will receive flu vaccine today. Discussed and ordered  Dexa scan, and mammogram    Problem List Items Addressed This Visit       Controlled type 2 diabetes mellitus with diabetic neuropathy, without long-term current use of insulin (CMS/[facility])    Relevant Orders    Follow Up In [facility] - PCP - Established    Hemoglobin A1c    HTN (hypertension) - Primary    Relevant Medications    lisinopril 5 mg tablet    Other Relevant Orders    Follow Up In [facility] - PCP - Established    Follow Up In [facility] - PCP - Established    Oral thrush    Relevant Medications    nystatin (Mycostatin) 100,000 unit/mL suspension    Health care maintenance    Relevant Orders    XR DEXA bone density    Flu vaccine, quadrivalent, high-dose, preservative  free, age 65y+ (FLUZONE) (Completed)    Encounter for screening mammogram for malignant neoplasm of breast    Relevant Orders    BI mammo bilateral screening tomosynthesis     Other Visit Diagnoses       Encounter for follow-up examination after completed treatment for conditions other than malignant neoplasm        Relevant Orders    XR DEXA bone density

## 2023-10-02 ENCOUNTER — TELEPHONE (OUTPATIENT)
Dept: PRIMARY CARE | Facility: CLINIC | Age: 73
End: 2023-10-02
Payer: COMMERCIAL

## 2023-10-02 DIAGNOSIS — M25.561 CHRONIC PAIN OF RIGHT KNEE: ICD-10-CM

## 2023-10-02 DIAGNOSIS — M19.041 PRIMARY OSTEOARTHRITIS OF BOTH HANDS: ICD-10-CM

## 2023-10-02 DIAGNOSIS — M19.042 PRIMARY OSTEOARTHRITIS OF BOTH HANDS: ICD-10-CM

## 2023-10-02 DIAGNOSIS — G89.29 CHRONIC PAIN OF RIGHT KNEE: ICD-10-CM

## 2023-10-02 RX ORDER — GABAPENTIN 300 MG/1
300 CAPSULE ORAL 3 TIMES DAILY
Qty: 90 CAPSULE | Refills: 0 | Status: SHIPPED | OUTPATIENT
Start: 2023-10-02 | End: 2023-10-25 | Stop reason: SDUPTHER

## 2023-10-02 NOTE — TELEPHONE ENCOUNTER
Refill on Gabapentin 300 mg  1 capsule 3 times a day    To JORGE Palmer    Last OV: 9-    Next OV: 12- with Cristino

## 2023-10-04 ENCOUNTER — PATIENT OUTREACH (OUTPATIENT)
Dept: PRIMARY CARE | Facility: CLINIC | Age: 73
End: 2023-10-04
Payer: COMMERCIAL

## 2023-10-04 NOTE — PROGRESS NOTES
Patient has met target of no readmission for (90) days post hospital discharge and is graduated from Transitional Care Management program at this time.  Damian Lawrence LPN

## 2023-10-12 ENCOUNTER — TELEPHONE (OUTPATIENT)
Dept: HEMATOLOGY/ONCOLOGY | Facility: CLINIC | Age: 73
End: 2023-10-12
Payer: COMMERCIAL

## 2023-10-12 DIAGNOSIS — C57.01 CARCINOMA OF RIGHT FALLOPIAN TUBE (MULTI): ICD-10-CM

## 2023-10-12 RX ORDER — MORPHINE SULFATE 60 MG/1
60 TABLET, FILM COATED, EXTENDED RELEASE ORAL 2 TIMES DAILY
Qty: 60 TABLET | Refills: 0 | Status: SHIPPED | OUTPATIENT
Start: 2023-10-12 | End: 2023-11-09 | Stop reason: SDUPTHER

## 2023-10-12 NOTE — TELEPHONE ENCOUNTER
Oarrs report obtained, last refill verified, information forward to provider   Refill date 9/13,  #60 tabs for 30day

## 2023-10-18 ENCOUNTER — ANCILLARY PROCEDURE (OUTPATIENT)
Dept: RADIOLOGY | Facility: CLINIC | Age: 73
End: 2023-10-18
Payer: MEDICARE

## 2023-10-18 VITALS — BODY MASS INDEX: 31.5 KG/M2 | WEIGHT: 225 LBS | HEIGHT: 71 IN

## 2023-10-18 DIAGNOSIS — Z12.31 ENCOUNTER FOR SCREENING MAMMOGRAM FOR MALIGNANT NEOPLASM OF BREAST: ICD-10-CM

## 2023-10-18 PROCEDURE — 77067 SCR MAMMO BI INCL CAD: CPT | Mod: BILATERAL PROCEDURE | Performed by: RADIOLOGY

## 2023-10-18 PROCEDURE — 77067 SCR MAMMO BI INCL CAD: CPT

## 2023-10-18 PROCEDURE — 77063 BREAST TOMOSYNTHESIS BI: CPT | Mod: BILATERAL PROCEDURE | Performed by: RADIOLOGY

## 2023-10-19 ENCOUNTER — TELEPHONE (OUTPATIENT)
Dept: PRIMARY CARE | Facility: CLINIC | Age: 73
End: 2023-10-19
Payer: COMMERCIAL

## 2023-10-19 NOTE — TELEPHONE ENCOUNTER
----- Message from Carmen Urena DO sent at 10/19/2023 11:17 AM EDT -----  Review of mammogram, normal study no areas of concern will continue with annual screenings.

## 2023-10-25 ENCOUNTER — OFFICE VISIT (OUTPATIENT)
Dept: NEUROLOGY | Facility: HOSPITAL | Age: 73
End: 2023-10-25
Payer: MEDICARE

## 2023-10-25 VITALS
SYSTOLIC BLOOD PRESSURE: 115 MMHG | HEART RATE: 62 BPM | WEIGHT: 231.6 LBS | DIASTOLIC BLOOD PRESSURE: 69 MMHG | BODY MASS INDEX: 32.42 KG/M2 | HEIGHT: 71 IN

## 2023-10-25 DIAGNOSIS — R47.01 EXPRESSIVE APHASIA: ICD-10-CM

## 2023-10-25 DIAGNOSIS — Q21.12 PFO (PATENT FORAMEN OVALE) (HHS-HCC): ICD-10-CM

## 2023-10-25 DIAGNOSIS — I63.9 ISCHEMIC CEREBROVASCULAR ACCIDENT (CVA) DUE TO HYPERCOAGULABLE STATE (MULTI): Primary | ICD-10-CM

## 2023-10-25 DIAGNOSIS — G62.0 NEUROPATHY DUE TO CHEMOTHERAPEUTIC DRUG (MULTI): ICD-10-CM

## 2023-10-25 DIAGNOSIS — D68.59 ISCHEMIC CEREBROVASCULAR ACCIDENT (CVA) DUE TO HYPERCOAGULABLE STATE (MULTI): Primary | ICD-10-CM

## 2023-10-25 DIAGNOSIS — T45.1X5A NEUROPATHY DUE TO CHEMOTHERAPEUTIC DRUG (MULTI): ICD-10-CM

## 2023-10-25 PROCEDURE — 99214 OFFICE O/P EST MOD 30 MIN: CPT | Performed by: NURSE PRACTITIONER

## 2023-10-25 PROCEDURE — 3008F BODY MASS INDEX DOCD: CPT | Performed by: NURSE PRACTITIONER

## 2023-10-25 PROCEDURE — 1160F RVW MEDS BY RX/DR IN RCRD: CPT | Performed by: NURSE PRACTITIONER

## 2023-10-25 PROCEDURE — 3051F HG A1C>EQUAL 7.0%<8.0%: CPT | Performed by: NURSE PRACTITIONER

## 2023-10-25 PROCEDURE — 1159F MED LIST DOCD IN RCRD: CPT | Performed by: NURSE PRACTITIONER

## 2023-10-25 PROCEDURE — 3074F SYST BP LT 130 MM HG: CPT | Performed by: NURSE PRACTITIONER

## 2023-10-25 PROCEDURE — 1125F AMNT PAIN NOTED PAIN PRSNT: CPT | Performed by: NURSE PRACTITIONER

## 2023-10-25 PROCEDURE — 4010F ACE/ARB THERAPY RXD/TAKEN: CPT | Performed by: NURSE PRACTITIONER

## 2023-10-25 PROCEDURE — 1036F TOBACCO NON-USER: CPT | Performed by: NURSE PRACTITIONER

## 2023-10-25 PROCEDURE — 3078F DIAST BP <80 MM HG: CPT | Performed by: NURSE PRACTITIONER

## 2023-10-25 RX ORDER — GABAPENTIN 300 MG/1
CAPSULE ORAL
Qty: 360 CAPSULE | Refills: 3 | Status: SHIPPED | OUTPATIENT
Start: 2023-10-25

## 2023-10-25 RX ORDER — GABAPENTIN 300 MG/1
CAPSULE ORAL
Qty: 360 CAPSULE | Refills: 3 | Status: SHIPPED | OUTPATIENT
Start: 2023-10-25 | End: 2023-10-25 | Stop reason: SDUPTHER

## 2023-10-25 ASSESSMENT — ENCOUNTER SYMPTOMS
LOSS OF SENSATION IN FEET: 1
OCCASIONAL FEELINGS OF UNSTEADINESS: 1
DEPRESSION: 0

## 2023-10-25 ASSESSMENT — PATIENT HEALTH QUESTIONNAIRE - PHQ9
1. LITTLE INTEREST OR PLEASURE IN DOING THINGS: NOT AT ALL
2. FEELING DOWN, DEPRESSED OR HOPELESS: NOT AT ALL
SUM OF ALL RESPONSES TO PHQ9 QUESTIONS 1 AND 2: 0

## 2023-10-25 ASSESSMENT — VISUAL ACUITY: VA_NORMAL: 1

## 2023-10-25 NOTE — ASSESSMENT & PLAN NOTE
Cancer has recurred and progressed consistent with concerns for hypercoagulable state at time of strokes. Discussed with patient and family. Currently not pursuing the palliative chemotherapy, no curative treatment recommended.   Discussed risks of bleeding associated with long term continuation of OAC vs stopping with active cancer state and risk of repeat stroke or other vascular event, pt states understanding. Would like to continue this regimen.   Rx sent for eliquis. Continue daily statin and daily 81 mg aspirin.

## 2023-10-25 NOTE — PROGRESS NOTES
SUBJECTIVE    Antoine Romero is a 73 y.o. right-handed female who presents with   Chief Complaint   Patient presents with    Stroke     FU stroke 7/1/23      Patient is accompanied by: child daughter .    Presents for follow up visit  Visit type: in-clinic visit    HISTORY OF PRESENT ILLNESS    RECAP:  Initially seen as hospital follow up for stroke.   PMH: HTN, DLD, DM, Ovarian/fallopian tube cancer, s/p chemotherapy and surgery, prior recurrence of cancer with last chemo ending 2/2023, low back pain, diverticulitis, s/p laparoscopic cholecystectomy 6/2023.     Family last saw patient when she was dropped off at home on 6/30/23 about 9 pm, she was normal. Around 0900 on 7/1/23, daughter sent her a text message and her response did not make sense. Son went to check on her and found her to have trouble speaking and confusion. EMS transported to the ED. Initial BP was 135/76, HR 67, afebrile. NIHSS was 5. HCT showed concern for stoke in left frontal lobe/opercular area. Subsequent CTA head and neck without occlusion or stenosis. Pt not candidate for thrombolysis due to history of cancer, instead started on heparin drip for presumed hypercoagulable state r/t cancer and admitted to the hospital. Repeat HCT did not show any hemorrhagic transformation. Pt was previously taking 81 mg ASA and simvastatin prior to admission.      Lipid panel unremarkable. A1C 7.2%.   TTE: normal EF, normal LA size, + bubble study  MRI brain wo contrast showed acute infarct involving L frontal lobe and left subinsular region, local mass effect with effacement of sulci, no midline shift. Subcentimeter acute infarct within right cerebellum. Chronic atrophy, small vessel disease and remote lacunar infarcts in right basal ganglia, left thalamus and cerebellum.   Repeat NIHSS for Dr. 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 Reportedly still some  "cancer (peritoneal deposits per chart review) but \"numbers are stable\" and not currently on treatment.      No history of stroke or seizure in the past. No prior known clots.      Remote history of smoking in her 20s, remote history of alcohol use in her 20s. Denies illicit drug use.      7/26/23- Presents with daughter, Tiffanie, for today's follow up in clinic.      Pt feels she is doing \"ok\" since discharge home. Daughter reports that she is still more quiet than her normal, does have continued episodes of confusion and expressive aphasia. Daughter states sometimes it seems as if she is mixing two thoughts together.      SLP started coming yesterday, PT and OT as well. Tuesdays and thursdays x 6 weeks at home therapy is planned. States SLP did give her some exercises that she has already completed. Enjoys doing word searches so her daughter found her some books to continue working on them.      No issues with comprehension.     10/25/23 -     Pt reports that she feels she is doing well overall in regards to stroke. Still on eliquis and aspirin, denies any bleeding issues. Feels that her speech is ok, family notes sometimes she is still mixing up words but it is mild.     States neuropathy is well controlled during the day on the gabapentin but seems to be worse at night when she is trying to sleep.     Changing PCP, Dr. Kaylan parry, will be establishing with Cristino Cruz CNP in the same office there.    Has since followed up with oncology Dr. Weiss. In june, CT of abd/pelvis showed peritoneal deposits slightly increased in size. September PET scan concerning for metastatic disease to L pelvis as well as newly developed right pelvis mets. Per patient and family, treatment would be palliative, pt is not going to pursue it.     REVIEW OF SYSTEMS:  10 point review of systems performed and is negative except as noted in the HPI.     Past Medical History:   Diagnosis Date    Hx antineoplastic chemo     Intra-abdominal and " pelvic swelling, mass and lump, unspecified site 01/20/2021    Pelvic mass in female    Other nonspecific abnormal finding of lung field 01/11/2021    Lung infiltrate on CT    Ovarian cancer (CMS/HCC)     Personal history of other diseases of the circulatory system 01/20/2021    History of hypertension    Personal history of other diseases of the musculoskeletal system and connective tissue     History of chronic back pain    Personal history of other endocrine, nutritional and metabolic disease 01/20/2021    History of hyperlipidemia    Personal history of other endocrine, nutritional and metabolic disease 01/20/2021    History of diabetes mellitus       No relevant family history has been documented for this patient.    Past Surgical History:   Procedure Laterality Date    CT GUIDED PERCUTANEOUS BIOPSY LIVER  06/16/2023    CT GUIDED PERCUTANEOUS BIOPSY LIVER 6/16/2023 POR CT    HYSTERECTOMY      MR HEAD ANGIO WO IV CONTRAST  01/16/2023    MR HEAD ANGIO WO IV CONTRAST 1/16/2023 [facility]    MR NECK ANGIO WO IV CONTRAST  01/16/2023    MR NECK ANGIO WO IV CONTRAST 1/16/2023 [facility]CTOR [facility]    OTHER SURGICAL HISTORY  02/04/2020    Nissen fundoplication laparoscopic    OTHER SURGICAL HISTORY  02/04/2020    Lower back surgery    OTHER SURGICAL HISTORY  02/04/2020    Hysterectomy    OTHER SURGICAL HISTORY  02/04/2020    Tonsillectomy    OTHER SURGICAL HISTORY  02/04/2020    Foot surgery    OTHER SURGICAL HISTORY  02/04/2020    Lumpectomy       Social History     Substance and Sexual Activity   Drug Use Yes    Types: Morphine, Oxycodone     Tobacco Use: Low Risk  (10/25/2023)    Patient History     Smoking Tobacco Use: Never     Smokeless Tobacco Use: Never     Passive Exposure: Not on file     Alcohol Use: Not on file       Current Outpatient Medications on File Prior to Visit   Medication Sig Dispense Refill    albuterol 2.5 mg /3 mL (0.083 %) nebulizer solution Take 3 mL (2.5 mg) by nebulization.       ascorbic acid (Vitamin C) 500 mg tablet Take by mouth.      aspirin 81 mg EC tablet Take 1 tablet (81 mg) by mouth once daily.      atorvastatin (Lipitor) 80 mg tablet Take 1 tablet (80 mg) by mouth once daily at bedtime. 90 tablet 1    bisoproloL-hydrochlorothiazide (Ziac) 2.5-6.25 mg tablet Take 1 tablet by mouth once daily. 90 tablet 2    FreeStyle glucose monitoring kit 1 each if needed (for fasting blood sugar measurement). 1 each 0    FreeStyle Lite Strips strip USE 1 STRIP TWICE DAILY 200 strip 2    glimepiride (Amaryl) 2 mg tablet TAKE 1 TABLET BY MOUTH TWICE DAILY WITH BREAKFAST AND DINNER, DO NOT TAKE IF NOT EATING 180 tablet 2    lancets misc Patient to test once daily 100 each 3    lisinopril 5 mg tablet Take 1 tablet (5 mg) by mouth once daily. 90 tablet 1    magnesium oxide (Mag-Ox) 400 mg tablet Take 1 tablet (400 mg) by mouth once daily.      meclizine (Antivert) 25 mg tablet 1 tablet (25 mg).      morphine CR (MS Contin) 60 mg 12 hr tablet Take 1 tablet (60 mg) by mouth 2 times a day. Do not crush, chew, or split. 60 tablet 0    mv-mn-iron-FA-herbal cmplx#190 (Vitamin D3 Complete) 18 mg iron-800 mcg-150 mg tablet Take by mouth.      omeprazole (PriLOSEC) 40 mg DR capsule TAKE ONE CAPSULE BY MOUTH EVERY DAY 90 capsule 1    oxyCODONE (Roxicodone) 10 mg immediate release tablet Take 1-2 tablets (10-20 mg) by mouth every 6 hours if needed.      sennosides (senna) 8.6 mg tablet Take by mouth.      tiZANidine (Zanaflex) 4 mg tablet TAKE TWO TABLETS BY MOUTH THREE TIMES A DAY AS NEEDED FOR MUSCLE SPASMS 180 tablet 0    zinc gluconate 50 mg tablet Take 1 tablet (50 mg of elemental zinc) by mouth once daily.      [DISCONTINUED] apixaban (Eliquis) 5 mg tablet TAKE 1 TABLET BY MOUTH EVERY 12 HOURS 60 tablet 0    [DISCONTINUED] gabapentin (Neurontin) 300 mg capsule Take 1 capsule (300 mg) by mouth 3 times a day. 90 capsule 0    [DISCONTINUED] apixaban (Eliquis) 5 mg tablet Take 1 tablet (5 mg) by mouth 2 times  "a day.      [DISCONTINUED] atorvastatin (Lipitor) 80 mg tablet TAKE 1 TABLET BY MOUTH ONCE DAILY AT BEDTIME 30 tablet 0     No current facility-administered medications on file prior to visit.         OBJECTIVE    /69 (BP Location: Left arm, Patient Position: Sitting)   Pulse 62   Ht 1.803 m (5' 11\")   Wt 105 kg (231 lb 9.6 oz)   BMI 32.30 kg/m²       Physical Exam  Eyes:      General: Lids are normal.      Extraocular Movements: Extraocular movements intact.      Pupils: Pupils are equal, round, and reactive to light.   Neurological:      Motor: Motor strength is normal.  Psychiatric:         Speech: Speech normal.       Neurological Exam  Mental Status  Awake, alert and oriented to person, place and time. Oriented to person, place, time and situation. Recent and remote memory are intact. Speech is normal. Language is fluent with no aphasia. Attention and concentration are normal. Fund of knowledge is appropriate for level of education.    Cranial Nerves  CN II: Visual acuity is normal. Visual fields full to confrontation.  CN III, IV, VI: Extraocular movements intact bilaterally. Normal lids and orbits bilaterally. Pupils equal round and reactive to light bilaterally.  CN V: Facial sensation is normal.  CN VII: Full and symmetric facial movement.  CN VIII: Hearing is normal.  CN IX, X: Palate elevates symmetrically. Normal gag reflex.  CN XI: Shoulder shrug strength is normal.  CN XII: Tongue midline without atrophy or fasciculations.    Motor  Normal muscle bulk throughout. No fasciculations present. Normal muscle tone. No abnormal involuntary movements. Strength is 5/5 throughout all four extremities.    Sensory  Light touch is normal in upper and lower extremities.     Gait  Casual gait is normal including stance, stride, and arm swing.        MR brain wo IV contrast 07/02/2023    Narrative  Interpreted By:  STEPHEN WASHINGTON MD  MRN: [id number]  Patient Name: JERMAINE SOTO    STUDY:  MRI BRAIN WO;  " 7/2/2023 3:15 pm    INDICATION:  Aphasia .    COMPARISON:  07/01/2023    ACCESSION NUMBER(S):  [id number]    ORDERING CLINICIAN:  CORI IYER    TECHNIQUE:  Axial T2, FLAIR, DWI, gradient echo T2 and sagittal and coronal T1  weighted images of brain were acquired.    FINDINGS:  There is diffusion restriction within the left frontal lobe and left  subinsular region compatible with an acute infarct. There is  associated T2 and FLAIR signal abnormality with local mass effect and  effacement of the sulci. No midline shift. There is a subcentimeter  focus of diffusion restriction within the right cerebellum compatible  with an acute infarct.    Ventricles, cortical sulci and basal cisterns are prominent  reflecting age related involutional changes and volume loss. There is  no extra-axial fluid collection, mass effect or midline shift.    Mild degree of patchy subcortical and periventricular T2 and FLAIR  hyperintense signal is compatible with microangiopathy. Prominent  ossification along the falx is noted.    Old lacunar infarct within the right basal ganglia, left thalamus and  bilateral cerebellum. Major intracranial flow voids at the skull base  are unremarkable. Paranasal sinuses are predominantly clear. Trace  nonspecific fluid within the right mastoid air cells and mucous  retention cyst versus polyp within the right maxillary sinus.    Cerebellar tonsils are above the foramen magnum. Pituitary and sella  are not enlarged.    No abnormal susceptibility artifact.    Impression  Acute infarct involving the left frontal lobe and left subinsular  region. There is local mass effect with effacement of the sulci. No  midline shift.    Subcentimeter acute infarct within the right cerebellum.    Volume loss and mild degree of nonspecific white matter signal  compatible with microangiopathy. Old lacunar infarcts within the  right basal ganglia, left thalamus and cerebellum.        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 Discussed possible side effects of increase. New regimen will be 300 mg - 300 mg - 600 mg, rx sent for increase, can finish what she has at home with these new instructions. States understanding.          Relevant Medications    gabapentin (Neurontin) 300 mg capsule    Other Relevant Orders    Follow Up In Neurology    Expressive aphasia    Overview     Sequelae of L frontal/subinsular stroke.              Current Assessment & Plan     Stable and mild per pt and family.          Ischemic cerebrovascular accident (CVA) due to hypercoagulable state (CMS/HCC) - Primary    Overview     CVA to left frontal and left subinsular region, July 2023  Subcentimeter infarct to R cerebellar region, July 2023    Bilateral infacts in both anterior and posterior circulation involved, consistent with presumed central embolic source.   CTA head and neck negative for significant large vessel stenosis or occlusion.   Recent history of cancer, concern for hypercoagulable state  Pt was started on eliquis during hospitalization, no bleeding issues.   Previously on 81 mg ASA and statin, continued  Other vascular risk factors include DM (A1C 7.2%), HTN, HLD           Current Assessment & Plan     Cancer has recurred and progressed consistent with concerns for hypercoagulable state at time of strokes. Discussed with patient and family. Currently not pursuing the palliative chemotherapy, no curative treatment recommended.    Discussed risks of bleeding associated with long term continuation of OAC vs stopping with active cancer state and risk of repeat stroke or other vascular event, pt states understanding. Would like to continue this regimen.   Rx sent for eliquis. Continue daily statin and daily 81 mg aspirin.          Relevant Medications    apixaban (Eliquis) 5 mg tablet    Other Relevant Orders    Follow Up In Neurology         FU in 1 year     I personally spent 34 minutes today, exclusive of procedures, providing care for this patient, including preparation, face to face time, documentation and other services such as review of medical records, diagnostic result, patient education, counseling, coordination of care as specified in the encounter.

## 2023-10-25 NOTE — ASSESSMENT & PLAN NOTE
Pain worse at night per pt, will increase night dose to 600 mg to help. Discussed possible side effects of increase. New regimen will be 300 mg - 300 mg - 600 mg, rx sent for increase, can finish what she has at home with these new instructions. States understanding.

## 2023-10-25 NOTE — PATIENT INSTRUCTIONS
Continue your aspirin and eliquis and daily statin. Let me know right away if you have any bleeding issues.   Increase your dose of gabapentin to 600 mg at night, keep other two at 300 mg. Hopefully this will help with the increased pain at night.   FU in 1 year.

## 2023-11-01 DIAGNOSIS — E78.5 HYPERLIPIDEMIA ASSOCIATED WITH TYPE 2 DIABETES MELLITUS (MULTI): Primary | ICD-10-CM

## 2023-11-01 DIAGNOSIS — E11.69 HYPERLIPIDEMIA ASSOCIATED WITH TYPE 2 DIABETES MELLITUS (MULTI): Primary | ICD-10-CM

## 2023-11-01 RX ORDER — SIMVASTATIN 40 MG/1
40 TABLET, FILM COATED ORAL NIGHTLY
Qty: 90 TABLET | Refills: 2 | Status: SHIPPED | OUTPATIENT
Start: 2023-11-01 | End: 2024-04-12 | Stop reason: ALTCHOICE

## 2023-11-06 ENCOUNTER — TELEPHONE (OUTPATIENT)
Dept: PRIMARY CARE | Facility: CLINIC | Age: 73
End: 2023-11-06
Payer: COMMERCIAL

## 2023-11-06 ENCOUNTER — ANCILLARY PROCEDURE (OUTPATIENT)
Dept: RADIOLOGY | Facility: CLINIC | Age: 73
End: 2023-11-06
Payer: MEDICARE

## 2023-11-06 DIAGNOSIS — Z00.00 HEALTH CARE MAINTENANCE: ICD-10-CM

## 2023-11-06 DIAGNOSIS — Z09 ENCOUNTER FOR FOLLOW-UP EXAMINATION AFTER COMPLETED TREATMENT FOR CONDITIONS OTHER THAN MALIGNANT NEOPLASM: ICD-10-CM

## 2023-11-06 PROCEDURE — 77080 DXA BONE DENSITY AXIAL: CPT

## 2023-11-06 PROCEDURE — 77080 DXA BONE DENSITY AXIAL: CPT | Performed by: RADIOLOGY

## 2023-11-08 ENCOUNTER — TELEPHONE (OUTPATIENT)
Dept: HEMATOLOGY/ONCOLOGY | Facility: CLINIC | Age: 73
End: 2023-11-08
Payer: COMMERCIAL

## 2023-11-08 DIAGNOSIS — G89.4 CHRONIC PAIN SYNDROME: ICD-10-CM

## 2023-11-08 DIAGNOSIS — C57.01 CARCINOMA OF RIGHT FALLOPIAN TUBE (MULTI): ICD-10-CM

## 2023-11-08 RX ORDER — OXYCODONE HYDROCHLORIDE 10 MG/1
10-20 TABLET ORAL EVERY 6 HOURS PRN
Qty: 90 TABLET | Refills: 0 | Status: CANCELLED | OUTPATIENT
Start: 2023-11-08

## 2023-11-08 RX ORDER — MORPHINE SULFATE 60 MG/1
60 TABLET, FILM COATED, EXTENDED RELEASE ORAL 2 TIMES DAILY
Qty: 60 TABLET | Refills: 0 | Status: CANCELLED | OUTPATIENT
Start: 2023-11-08 | End: 2023-12-08

## 2023-11-08 NOTE — TELEPHONE ENCOUNTER
Oarrs report obtained, last refill verified, information forward to provider   Last refill of Morphine was 10/14  Last refill of Oxycodone was 9/27    Resent to provider

## 2023-11-09 RX ORDER — OXYCODONE HYDROCHLORIDE 10 MG/1
10-20 TABLET ORAL EVERY 6 HOURS PRN
Qty: 120 TABLET | Refills: 0 | Status: SHIPPED | OUTPATIENT
Start: 2023-11-09 | End: 2023-12-20 | Stop reason: SDUPTHER

## 2023-11-09 RX ORDER — MORPHINE SULFATE 60 MG/1
60 TABLET, FILM COATED, EXTENDED RELEASE ORAL 2 TIMES DAILY
Qty: 60 TABLET | Refills: 0 | Status: SHIPPED | OUTPATIENT
Start: 2023-11-09 | End: 2023-12-06 | Stop reason: SDUPTHER
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
## 2023-12-06 ENCOUNTER — TELEPHONE (OUTPATIENT)
Dept: HEMATOLOGY/ONCOLOGY | Facility: CLINIC | Age: 73
End: 2023-12-06
Payer: COMMERCIAL

## 2023-12-06 DIAGNOSIS — C57.01 CARCINOMA OF RIGHT FALLOPIAN TUBE (MULTI): ICD-10-CM

## 2023-12-06 RX ORDER — MORPHINE SULFATE 60 MG/1
60 TABLET, FILM COATED, EXTENDED RELEASE ORAL 2 TIMES DAILY
Qty: 60 TABLET | Refills: 0 | Status: SHIPPED | OUTPATIENT
Start: 2023-12-06 | End: 2024-01-10 | Stop reason: SDUPTHER

## 2023-12-06 NOTE — TELEPHONE ENCOUNTER
Oarrs report obtained, last refill verified, information forward to provider   Last fill of Morphine was 11/12

## 2023-12-13 ENCOUNTER — TELEPHONE (OUTPATIENT)
Dept: PRIMARY CARE | Facility: CLINIC | Age: 73
End: 2023-12-13
Payer: COMMERCIAL

## 2023-12-13 DIAGNOSIS — Z76.0 MEDICATION REFILL: ICD-10-CM

## 2023-12-13 RX ORDER — OMEPRAZOLE 40 MG/1
40 CAPSULE, DELAYED RELEASE ORAL DAILY
Qty: 90 CAPSULE | Refills: 0 | Status: SHIPPED | OUTPATIENT
Start: 2023-12-13 | End: 2023-12-20 | Stop reason: SDUPTHER

## 2023-12-15 ENCOUNTER — LAB (OUTPATIENT)
Dept: LAB | Facility: HOSPITAL | Age: 73
End: 2023-12-15
Payer: MEDICARE

## 2023-12-15 ENCOUNTER — LAB (OUTPATIENT)
Dept: LAB | Facility: LAB | Age: 73
End: 2023-12-15
Payer: MEDICARE

## 2023-12-15 DIAGNOSIS — E11.40 CONTROLLED TYPE 2 DIABETES MELLITUS WITH DIABETIC NEUROPATHY, WITHOUT LONG-TERM CURRENT USE OF INSULIN (MULTI): ICD-10-CM

## 2023-12-15 DIAGNOSIS — C57.01 CARCINOMA OF RIGHT FALLOPIAN TUBE (MULTI): ICD-10-CM

## 2023-12-15 DIAGNOSIS — C57.01 CARCINOMA OF RIGHT FALLOPIAN TUBE (MULTI): Primary | ICD-10-CM

## 2023-12-15 LAB
ALBUMIN SERPL BCP-MCNC: 3.7 G/DL (ref 3.4–5)
ALP SERPL-CCNC: 63 U/L (ref 33–136)
ALT SERPL W P-5'-P-CCNC: 8 U/L (ref 7–45)
ANION GAP SERPL CALC-SCNC: 9 MMOL/L (ref 10–20)
AST SERPL W P-5'-P-CCNC: 14 U/L (ref 9–39)
BASOPHILS # BLD AUTO: 0.01 X10*3/UL (ref 0–0.1)
BASOPHILS NFR BLD AUTO: 0.2 %
BILIRUB SERPL-MCNC: 0.4 MG/DL (ref 0–1.2)
BUN SERPL-MCNC: 20 MG/DL (ref 6–23)
CALCIUM SERPL-MCNC: 9.3 MG/DL (ref 8.6–10.3)
CANCER AG125 SERPL-ACNC: 20.6 U/ML (ref 0–30.2)
CHLORIDE SERPL-SCNC: 100 MMOL/L (ref 98–107)
CO2 SERPL-SCNC: 30 MMOL/L (ref 21–32)
CREAT SERPL-MCNC: 0.88 MG/DL (ref 0.5–1.05)
EOSINOPHIL # BLD AUTO: 0.14 X10*3/UL (ref 0–0.4)
EOSINOPHIL NFR BLD AUTO: 2.7 %
ERYTHROCYTE [DISTWIDTH] IN BLOOD BY AUTOMATED COUNT: 14.5 % (ref 11.5–14.5)
GFR SERPL CREATININE-BSD FRML MDRD: 70 ML/MIN/1.73M*2
GLUCOSE SERPL-MCNC: 161 MG/DL (ref 74–99)
HCT VFR BLD AUTO: 33.8 % (ref 36–46)
HGB BLD-MCNC: 10.5 G/DL (ref 12–16)
IMM GRANULOCYTES # BLD AUTO: 0.01 X10*3/UL (ref 0–0.5)
IMM GRANULOCYTES NFR BLD AUTO: 0.2 % (ref 0–0.9)
LYMPHOCYTES # BLD AUTO: 1.49 X10*3/UL (ref 0.8–3)
LYMPHOCYTES NFR BLD AUTO: 28.5 %
MCH RBC QN AUTO: 26.5 PG (ref 26–34)
MCHC RBC AUTO-ENTMCNC: 31.1 G/DL (ref 32–36)
MCV RBC AUTO: 85 FL (ref 80–100)
MONOCYTES # BLD AUTO: 0.43 X10*3/UL (ref 0.05–0.8)
MONOCYTES NFR BLD AUTO: 8.2 %
NEUTROPHILS # BLD AUTO: 3.14 X10*3/UL (ref 1.6–5.5)
NEUTROPHILS NFR BLD AUTO: 60.2 %
PLATELET # BLD AUTO: 146 X10*3/UL (ref 150–450)
POTASSIUM SERPL-SCNC: 4.1 MMOL/L (ref 3.5–5.3)
PROT SERPL-MCNC: 6.5 G/DL (ref 6.4–8.2)
RBC # BLD AUTO: 3.96 X10*6/UL (ref 4–5.2)
SODIUM SERPL-SCNC: 135 MMOL/L (ref 136–145)
WBC # BLD AUTO: 5.2 X10*3/UL (ref 4.4–11.3)

## 2023-12-15 PROCEDURE — 80053 COMPREHEN METABOLIC PANEL: CPT | Performed by: INTERNAL MEDICINE

## 2023-12-15 PROCEDURE — 36415 COLL VENOUS BLD VENIPUNCTURE: CPT

## 2023-12-15 PROCEDURE — 85025 COMPLETE CBC W/AUTO DIFF WBC: CPT | Performed by: INTERNAL MEDICINE

## 2023-12-15 PROCEDURE — 86304 IMMUNOASSAY TUMOR CA 125: CPT | Performed by: INTERNAL MEDICINE

## 2023-12-15 PROCEDURE — 83036 HEMOGLOBIN GLYCOSYLATED A1C: CPT

## 2023-12-16 LAB
EST. AVERAGE GLUCOSE BLD GHB EST-MCNC: 143 MG/DL
HBA1C MFR BLD: 6.6 %

## 2023-12-20 ENCOUNTER — OFFICE VISIT (OUTPATIENT)
Dept: PRIMARY CARE | Facility: CLINIC | Age: 73
End: 2023-12-20
Payer: MEDICARE

## 2023-12-20 ENCOUNTER — OFFICE VISIT (OUTPATIENT)
Dept: HEMATOLOGY/ONCOLOGY | Facility: CLINIC | Age: 73
End: 2023-12-20
Payer: MEDICARE

## 2023-12-20 VITALS
HEART RATE: 59 BPM | OXYGEN SATURATION: 97 % | TEMPERATURE: 97.9 F | RESPIRATION RATE: 16 BRPM | SYSTOLIC BLOOD PRESSURE: 111 MMHG | DIASTOLIC BLOOD PRESSURE: 69 MMHG | WEIGHT: 233 LBS | BODY MASS INDEX: 32.62 KG/M2 | HEIGHT: 71 IN

## 2023-12-20 VITALS
DIASTOLIC BLOOD PRESSURE: 72 MMHG | WEIGHT: 233 LBS | HEIGHT: 71 IN | SYSTOLIC BLOOD PRESSURE: 114 MMHG | BODY MASS INDEX: 32.62 KG/M2 | RESPIRATION RATE: 16 BRPM | HEART RATE: 61 BPM | OXYGEN SATURATION: 93 %

## 2023-12-20 DIAGNOSIS — G89.4 CHRONIC PAIN SYNDROME: ICD-10-CM

## 2023-12-20 DIAGNOSIS — B02.9 HERPES ZOSTER WITHOUT COMPLICATION: ICD-10-CM

## 2023-12-20 DIAGNOSIS — C56.3: ICD-10-CM

## 2023-12-20 DIAGNOSIS — I10 PRIMARY HYPERTENSION: ICD-10-CM

## 2023-12-20 DIAGNOSIS — E11.40 CONTROLLED TYPE 2 DIABETES MELLITUS WITH DIABETIC NEUROPATHY, WITHOUT LONG-TERM CURRENT USE OF INSULIN (MULTI): Primary | ICD-10-CM

## 2023-12-20 DIAGNOSIS — E78.5 HYPERLIPIDEMIA ASSOCIATED WITH TYPE 2 DIABETES MELLITUS (MULTI): ICD-10-CM

## 2023-12-20 DIAGNOSIS — Z00.00 MEDICARE ANNUAL WELLNESS VISIT, SUBSEQUENT: ICD-10-CM

## 2023-12-20 DIAGNOSIS — E11.69 HYPERLIPIDEMIA ASSOCIATED WITH TYPE 2 DIABETES MELLITUS (MULTI): ICD-10-CM

## 2023-12-20 DIAGNOSIS — E55.9 VITAMIN D DEFICIENCY: ICD-10-CM

## 2023-12-20 DIAGNOSIS — B37.2 CANDIDA INFECTION OF FLEXURAL SKIN: ICD-10-CM

## 2023-12-20 DIAGNOSIS — I63.512 ACUTE ISCHEMIC LEFT MCA STROKE (MULTI): ICD-10-CM

## 2023-12-20 DIAGNOSIS — I25.10 CORONARY ARTERY DISEASE INVOLVING NATIVE CORONARY ARTERY OF NATIVE HEART WITHOUT ANGINA PECTORIS: ICD-10-CM

## 2023-12-20 DIAGNOSIS — Z76.0 MEDICATION REFILL: ICD-10-CM

## 2023-12-20 DIAGNOSIS — C57.01 CARCINOMA OF RIGHT FALLOPIAN TUBE (MULTI): Primary | ICD-10-CM

## 2023-12-20 PROCEDURE — 99213 OFFICE O/P EST LOW 20 MIN: CPT | Performed by: INTERNAL MEDICINE

## 2023-12-20 PROCEDURE — 1125F AMNT PAIN NOTED PAIN PRSNT: CPT | Performed by: NURSE PRACTITIONER

## 2023-12-20 PROCEDURE — 4010F ACE/ARB THERAPY RXD/TAKEN: CPT | Performed by: INTERNAL MEDICINE

## 2023-12-20 PROCEDURE — 3074F SYST BP LT 130 MM HG: CPT | Performed by: NURSE PRACTITIONER

## 2023-12-20 PROCEDURE — 3074F SYST BP LT 130 MM HG: CPT | Performed by: INTERNAL MEDICINE

## 2023-12-20 PROCEDURE — 1159F MED LIST DOCD IN RCRD: CPT | Performed by: NURSE PRACTITIONER

## 2023-12-20 PROCEDURE — 1160F RVW MEDS BY RX/DR IN RCRD: CPT | Performed by: INTERNAL MEDICINE

## 2023-12-20 PROCEDURE — 3078F DIAST BP <80 MM HG: CPT | Performed by: INTERNAL MEDICINE

## 2023-12-20 PROCEDURE — 3008F BODY MASS INDEX DOCD: CPT | Performed by: NURSE PRACTITIONER

## 2023-12-20 PROCEDURE — 3078F DIAST BP <80 MM HG: CPT | Performed by: NURSE PRACTITIONER

## 2023-12-20 PROCEDURE — 1036F TOBACCO NON-USER: CPT | Performed by: NURSE PRACTITIONER

## 2023-12-20 PROCEDURE — 4010F ACE/ARB THERAPY RXD/TAKEN: CPT | Performed by: NURSE PRACTITIONER

## 2023-12-20 PROCEDURE — 1159F MED LIST DOCD IN RCRD: CPT | Performed by: INTERNAL MEDICINE

## 2023-12-20 PROCEDURE — 1125F AMNT PAIN NOTED PAIN PRSNT: CPT | Performed by: INTERNAL MEDICINE

## 2023-12-20 PROCEDURE — 99214 OFFICE O/P EST MOD 30 MIN: CPT | Performed by: NURSE PRACTITIONER

## 2023-12-20 PROCEDURE — 1036F TOBACCO NON-USER: CPT | Performed by: INTERNAL MEDICINE

## 2023-12-20 PROCEDURE — 3044F HG A1C LEVEL LT 7.0%: CPT | Performed by: INTERNAL MEDICINE

## 2023-12-20 PROCEDURE — 3008F BODY MASS INDEX DOCD: CPT | Performed by: INTERNAL MEDICINE

## 2023-12-20 PROCEDURE — 3044F HG A1C LEVEL LT 7.0%: CPT | Performed by: NURSE PRACTITIONER

## 2023-12-20 PROCEDURE — 1160F RVW MEDS BY RX/DR IN RCRD: CPT | Performed by: NURSE PRACTITIONER

## 2023-12-20 RX ORDER — ATORVASTATIN CALCIUM 80 MG/1
80 TABLET, FILM COATED ORAL NIGHTLY
Qty: 90 TABLET | Refills: 2 | Status: SHIPPED | OUTPATIENT
Start: 2023-12-20 | End: 2024-02-01 | Stop reason: SDUPTHER

## 2023-12-20 RX ORDER — OMEPRAZOLE 40 MG/1
40 CAPSULE, DELAYED RELEASE ORAL DAILY
Qty: 90 CAPSULE | Refills: 3 | Status: SHIPPED | OUTPATIENT
Start: 2023-12-20 | End: 2024-03-13 | Stop reason: SDUPTHER

## 2023-12-20 RX ORDER — OXYCODONE HYDROCHLORIDE 10 MG/1
10 TABLET ORAL EVERY 6 HOURS PRN
Qty: 120 TABLET | Refills: 0 | Status: SHIPPED | OUTPATIENT
Start: 2023-12-20 | End: 2024-01-24 | Stop reason: SDUPTHER

## 2023-12-20 RX ORDER — NYSTATIN 100000 U/G
CREAM TOPICAL 2 TIMES DAILY
Qty: 60 G | Refills: 0 | Status: SHIPPED | OUTPATIENT
Start: 2023-12-20 | End: 2024-01-24 | Stop reason: SDUPTHER

## 2023-12-20 RX ORDER — ACYCLOVIR 50 MG/G
1 OINTMENT TOPICAL
Qty: 5 G | Refills: 2 | Status: SHIPPED | OUTPATIENT
Start: 2023-12-20 | End: 2023-12-24

## 2023-12-20 RX ORDER — BISOPROLOL FUMARATE AND HYDROCHLOROTHIAZIDE 2.5; 6.25 MG/1; MG/1
1 TABLET ORAL DAILY
Qty: 90 TABLET | Refills: 2 | Status: SHIPPED | OUTPATIENT
Start: 2023-12-20

## 2023-12-20 ASSESSMENT — COLUMBIA-SUICIDE SEVERITY RATING SCALE - C-SSRS
2. HAVE YOU ACTUALLY HAD ANY THOUGHTS OF KILLING YOURSELF?: NO
1. IN THE PAST MONTH, HAVE YOU WISHED YOU WERE DEAD OR WISHED YOU COULD GO TO SLEEP AND NOT WAKE UP?: NO
6. HAVE YOU EVER DONE ANYTHING, STARTED TO DO ANYTHING, OR PREPARED TO DO ANYTHING TO END YOUR LIFE?: NO

## 2023-12-20 ASSESSMENT — ENCOUNTER SYMPTOMS
OCCASIONAL FEELINGS OF UNSTEADINESS: 0
DEPRESSION: 0
LOSS OF SENSATION IN FEET: 0

## 2023-12-20 ASSESSMENT — ANXIETY QUESTIONNAIRES
4. TROUBLE RELAXING: NOT AT ALL
GAD7 TOTAL SCORE: 0
IF YOU CHECKED OFF ANY PROBLEMS ON THIS QUESTIONNAIRE, HOW DIFFICULT HAVE THESE PROBLEMS MADE IT FOR YOU TO DO YOUR WORK, TAKE CARE OF THINGS AT HOME, OR GET ALONG WITH OTHER PEOPLE: NOT DIFFICULT AT ALL
3. WORRYING TOO MUCH ABOUT DIFFERENT THINGS: NOT AT ALL
6. BECOMING EASILY ANNOYED OR IRRITABLE: NOT AT ALL
1. FEELING NERVOUS, ANXIOUS, OR ON EDGE: NOT AT ALL
5. BEING SO RESTLESS THAT IT IS HARD TO SIT STILL: NOT AT ALL
2. NOT BEING ABLE TO STOP OR CONTROL WORRYING: NOT AT ALL
7. FEELING AFRAID AS IF SOMETHING AWFUL MIGHT HAPPEN: NOT AT ALL

## 2023-12-20 ASSESSMENT — PAIN SCALES - GENERAL: PAINLEVEL: 6

## 2023-12-20 NOTE — PROGRESS NOTES
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   Presented with right lower quadrant pain in late December that was initially attributed to gallstones.  On further work-up by her surgeon, ultrasound of the pelvis showed bilateral ovarian mass lesions measuring 6.4 cm on the right and 4.3 cm in the left.   CA-125: 24.0.  CT chest showed small lesions in right lung measuring 0.4 cm in RLL and 0.5 cm in RML.  Mammogram was negative.   3/1/2021: BSO, omentectomy and biopsies.  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 pelvic mass.  Left ovary/tube, omentum: negative.  0/8 nodes positive.   Adj. chemo recommended.   4/5/2021: : 15.5  4/12/2021: Cycle #1/6 adjuvant chemotherapy with paclitaxel/carboplatin  5/3/21: #2 Carbo/Taxol  5/24/21: #3 Carbo/taxol  6/21/21: #4/6 carbo/Taxol.  7/13/2021: #5/6 carbo/Taxol.  8/3/2021: #6/6 carbo/Taxol  8/11/2021: CA-125: 8.5.  1/10/2022: : 9.2.   4/28/2022: : 40.9.  5/16/2022: CT abdomen pelvis: Increased adenopathy consistent with progressive disease.  6/22/2022: : 41.0.  7/15/2022: #1 carboplatin/gemcitabine.  8/12/2022: #2 carboplatin/gemcitabine. D8 chemo was held because of pancytopenia  9/1/2022: : 13.9.  9/9/22      C3D1 , no D8  9/30/22    C4, carboplatin and gemcitabine with dose reduction, no day 8 chemo  10/13/22 , CT of A/P ,  Cystic mass noted  along the right posterior liver has increased in size, however all of  the othermesenteric and pelvic metastatic implants appear to be  decreased. No new sites of metastatic disease.  10/21/22   C5D1  11/11/22  C6D1  2/11/23 , CT of CAP , CHEST  1.  Stablechronic changes without evidence of metastatic disease  within the chest.   ABDOMEN - PELVIS  1.  Status post hysterectomy and bilateral salpingo-oophorectomy,  with similar to mildly  decreased appearance of peritoneal and  mesenteric deposits as described above. No visualized new metastatic  deposits. Maximum decrease is seen in the cystic lesion adjacent to  the inferior margin of the liver parenchyma.  2023: : 11.1 (normal).  23 , CT of abdominal pelvis, peritoneal deposits slightly increased in size      2023, right chest wall subcutaneous nodule, needle biopsy negative for malignancy  23 , PET , 1. FDG avid soft tissue implants in the left aspect of the pelvis, similar in size to abdominopelvic CT dated 2023, and remain concerning for metastatic disease. 2. Newly developed FDG avid soft tissue in the right aspect of the  pelvis may reflect a metastatic lymph node versus metastatic implant.           History of Present Illness:      ID Statement:    JERMAINE SOTO is a 73 year old Female        Chief Complaint: Ovarian cancer   Interval History:    Jermaine Soto returns today for follow-up and treatment of ovarian/fallopian tube cancer.  She complains of weakness and fatigue, no nausea, something patient lower  abdominal pain, lower abdominal pain is more worse, patient also has a history of vaginal bleed couple days ago.  No problem with bowel movements, no rectal bleed,  increased to 20.0     Past medical history: Ovarian/fallopian tube cancer, stage IIA, s/p BSO, omentectomy as described above. Last chemotherapy was given on 2022.  CT scans stable/improved.   levels have been normal. .  History hypertension, diabetes, diabetic neuropathy, low back pain, degenerative joint disease, tonsillectomy, vaginal hysterectomy  for benign disease, Nissen fundoplication,  4 back surgeries, ankle/foot surgeries.  Had biopsy of benign left breast lesion.  No other history of malignancy, MI, CVA or VTE disease.      Family medical history: Mother, father and brother had colon cancer.  Brother had brain tumor.  No family history of  breast, ovarian or other gynecologic  cancer.      Social history: Former smoker, quit 1973.  Remote history alcohol use, quit 1973.     Review of Systems:   Review of Systems:    Constitutional: No fever, chills, night sweats.  Some fatigue.  Head and neck: No headaches or dizziness.  No pain, stiffness.   HEENT: No sore throat, sinusitis.  Hearing is adequate. Vison is adequate.   Cardiac: No chest pain, palpitations, lightheadedness.   Left lateral chest wall pain, intermittent.  Respiratory: No increased dyspnea, cough, hemoptysis.   GI: Appetite is good, weight stable.  Occasional lower abdominal discomfort.  Some constipation, diarrhea, change in bowel movements, following treatment.  No melena, hematochezia, nausea, vomiting.  Genitourinary: No frequency, urgency.  No polyuria, dysuria, hematuria.   Musculoskeletal: Complains of mild arthralgias.    Endocrine: History diabetes, no thyroid disease.  Skin: No rash, skin lesions, itching.  Alopecia resolving.  Neuromuscular: No lightheadedness, dizziness.  No history of seizure.  Had some numbness, paresthesias in her feet, slowly improving.  No focal weakness, tremor.                    Allergies and Intolerances:       Allergies:         Bactrim: Drug, Itching (Severe), Rash (Severe), Active     Outpatient Medication Profile:  * Patient Currently Takes Medications as of 13-Sep-2023 12:39 documented in Structured Notes         atorvastatin 80 mg oral tablet : Last Dose Taken:  , 1 tab(s) orally once (at bedtime), Start Date: 05-Jul-2023         apixaban 5 mg oral tablet: Last Dose Taken:  , 1 tab(s) orally every  12 hours, Start Date: 05-Jul-2023         morphine 30 mg/8 to 12 hr oral tablet, extended release: Last Dose Taken:   , 1 tab(s) orally every 8 hours , Start Date: 24-May-2023         meclizine 25 mg oral tablet: Last Dose Taken:  , 1 tab(s) orally 3 times  a day as needed for dizziness, Start Date: 16-Jan-2023         glimepiride 2 mg oral tablet:  Last Dose Taken:  , 1 tab(s) orally once  a day         Allergy Relief 10 mg oral tablet: Last Dose Taken:  , 1 tab(s) orally  once a day         Zinc: Last Dose Taken:  , 50 mg oral once a day         tiZANidine 4 mg oral tablet: Last Dose Taken:  , 2 tab(s) oral prn         Aspir 81 oral delayed release tablet: Last Dose Taken:  , 1 tab(s) orally  once a day         bisoprolol-hydrochlorothiazide 5 mg-6.25 mg oral tablet: Last Dose Taken:   , 1 tab(s) orally once a day         Vitamin C 100 mg oral tablet: Last Dose Taken:  , 2 tab(s) orally 3 times  a day         Vitamin D3 125 mcg (5000 intl units) oral tablet: Last Dose Taken:  ,  1 tab(s) orally 2 times a day         magnesium carbonate 250 mg oral capsule: Last Dose Taken:           lisinopril 10 mg oral tablet: Last Dose Taken:  , 1 cap(s) oral once  a day         omeprazole 40 mg oral delayed release capsule: Last Dose Taken:  , 1  cap(s) oral once a day         senna 8.6 mg oral tablet: Last Dose Taken:  , 1 cap(s) oral once a day         gabapentin 300 mg oral capsule: Last Dose Taken:  , 1 cap(s) oral 3 times  a day         albuterol 2.5 mg/3 mL (0.083%) inhalation solution: Last Dose Taken:   , 1 INHL inhalation once a day             Medical History:         Encounter for antineoplastic chemotherapy: ICD-10: Z51.11,  Status: Active     Family History: No Family History items are recorded  in the problem list.      Social History:   Social Substance History:  ·  Smoking Status former smoker (1)            Vitals and Measurements:   Vitals: Temp: 36.3  HR: 75  RR: 16  BP: 106/72  SPO2%:   94   Measurements: HT(cm): 178.2  WT(kg): 102.3  BSA:  2.25  BMI:  32.2   Last 3 Weights & Heights: Date:                           Weight/Scale Type:                    Height:   13-Sep-2023 12:27                102.3  kg                     178.2  cm  28-Jun-2023 08:59                104.7  kg                     178.2  cm  25-May-2023 13:19                108  kg                      178.2  cm      Physical Exam:      Constitutional: Well developed, awake/alert/oriented  x3.   Eyes: PERRL, EOMI, clear sclera.   ENMT: No external lesions.   Head/Neck: Neck with normal movement.  No mass, adenopathy  or tenderness.  No JVD. Trachea midline.   Respiratory/Thorax: Thorax symmetric. Clear to auscultation.   No wheezes, rales, rhonchi.  Mild tenderness palpation left lower anterolateral chest wall/ribs, but no mass lesion, skin lesions.   Cardiovascular: Regular, rate and rhythm. No murmur.   No rubs or gallops. Normal S1, S2.   Gastrointestinal: Nondistended.  Healed abdominal  incision.  No masses palpable.  No palpable hepatomegaly, splenomegaly.   Lower abdominal pain on palpation   Musculoskeletal: Mild joint deformities.   Extremities: Unremarkable extremities; no cyanosis,   No edema.   Neurological: Alert and oriented x3.   Lymphatic: No palpably significant lymphadenopathy  in the neck, supraclavicular areas.   Psychological: Appropriate mood and behavior.   Skin: Warm and dry, no rashes, no suspicious lesions.         Lab Results:   0 Result Notes            Component  Ref Range & Units 5 d ago  (12/15/23) 3 mo ago  (9/13/23) 3 mo ago  (9/8/23) 7 mo ago  (5/19/23) 10 mo ago  (2/10/23) 1 yr ago  (12/7/22) 1 yr ago  (11/9/22)   Cancer   0.0 - 30.2 U/mL 20.6 CANCELED R, CM 10.8 CM 11.1 CM 9.6 CM 8.6 CM 9.3 CM   Resulting Agency [facility]        ·  Results                     Assessment and Plan:      Assessment and Plan:   Assessment:    1.  High-grade serous carcinoma right ovary/fallopian tube/, status postpelvic mass BSO, omentectomy, pelvic washings, FIGO stage IIa (pT2a pN0 cM0).  Status post  6 cycles adjuvant chemotherapy with paclitaxel/carboplatin.  Had significant musculoskeletal pain with Taxol infusions.  Ca-125 level elevated at 40.9 (previously normal).  CT shows evidence of progression.  Symptomatic. s/p 4 cycles  carboplatin/gemcitabine  October 13, 2022 CAT scan abdominal pelvis did show omental mass with decrease in size, patient is receiving only grade 1 chemotherapy including carboplatin and gemcitabine because of pancytopenia  11/11/22: Last dose of carboplatin and gemcitabine  2/11/23: CT of CAP: Chest- stable disease, no evidence of progression of disease, peritoneal nodule measuring 3 cm which is stable.  5/19/2023: : 11.1.  June 6, 2023, CT of  abdominal pelvis, peritoneal deposit slightly increased in size     9/09/23 , PET , 1. FDG avid soft tissue implants in the left aspect of the pelvis, similar in size to abdominopelvic CT dated 05/30/2023, and remain concerning for metastatic disease. 2. Newly developed FDG avid soft tissue in the right aspect of the  pelvis may reflect a metastatic lymph node versus metastatic implant.     2.  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      3.  Multiple medical problems.      4.  Abdominal pain, likely secondary to malignancy versus benign disease.  Controlled with MS Contin/oxycodone.     5.  History small right lung nodules of uncertain significance.     6.  Left lateral chest wall pain, intermittent, likely benign, rule out malignancy..     Plan:   Patient is complaining lower abdominal pain,  is increasing, history of vaginal bleed most probably we are dealing with progression of disease.  For further evaluation I will schedule for CAT scan of abdominal pelvis, reevaluation after CAT scan study, and then discussed with regarding possible palliative therapy.    No change in pain management.     Greater than3 0 minutes time spent with patient reviewing diagnostic studies, discussing diagnosis and treatment plan and documenting in EMR.

## 2023-12-20 NOTE — PROGRESS NOTES
"Subjective   Patient ID: Antoine Romero is a 73 y.o. female who presents for [facility].    This is a previous patient of Dr. Kimbrough in a wheelchair accompanied by her daughter (Tiffanie) coming to [facility] care.  She is currently under the care of oncology for fallopian tube/ovarian cancer.  She also follows with neurology for recent history of CVA. She is requesting for refill of Nystatin cream for back of left earlobe skin candida and Acyclovir ointment of viral rash of buttock.  Advises that her appetite is good and she sleeps good at night.  She denies acute medical complaint.       Review of Systems   Skin:  Positive for rash.   All other systems reviewed and are negative.      Objective   /72   Pulse 61   Resp 16   Ht 1.803 m (5' 11\")   Wt 106 kg (233 lb)   SpO2 93%   BMI 32.50 kg/m²     Physical Exam  Constitutional:       Appearance: She is obese.   HENT:      Head: Normocephalic and atraumatic.      Right Ear: External ear normal.      Ears:      Comments: Erythema/flaky region on back of left earlobe      Nose: Nose normal.      Mouth/Throat:      Mouth: Mucous membranes are moist.   Cardiovascular:      Rate and Rhythm: Normal rate and regular rhythm.      Pulses: Normal pulses.      Heart sounds: Normal heart sounds.   Pulmonary:      Effort: Pulmonary effort is normal.      Breath sounds: Normal breath sounds.   Musculoskeletal:      Cervical back: Neck supple.   Skin:     General: Skin is warm and dry.   Neurological:      Mental Status: She is alert and oriented to person, place, and time. Mental status is at baseline.   Psychiatric:         Mood and Affect: Mood normal.         Behavior: Behavior normal.         Thought Content: Thought content normal.         Judgment: Judgment normal.       Assessment/Plan   Problem List Items Addressed This Visit       Controlled type 2 diabetes mellitus with diabetic neuropathy, without long-term current use of insulin (CMS/[facility])    Relevant Medications "    acyclovir (Zovirax) 5 % ointment    Other Relevant Orders    Hemoglobin A1C    Lipid panel    TSH with reflex to Free T4 if abnormal    Albumin, urine, random    Urinalysis with Reflex Microscopic    HTN (hypertension)    Relevant Medications    bisoproloL-hydrochlorothiazide (Ziac) 2.5-6.25 mg tablet    Vitamin D deficiency    Relevant Orders    Vitamin D 25-Hydroxy,Total (for eval of Vitamin D levels)    Vitamin B12     Other Visit Diagnoses       Candida infection of flexural skin    -  Primary    Relevant Medications    nystatin (Mycostatin) cream    Acute ischemic left MCA stroke (CMS/HCC)        Relevant Medications    atorvastatin (Lipitor) 80 mg tablet    Medication refill        Relevant Medications    omeprazole (PriLOSEC) 40 mg DR capsule    Herpes zoster without complication        Medicare annual wellness visit, subsequent        Relevant Orders    Follow Up In [facility] - PCP - Medicare Annual

## 2023-12-20 NOTE — PATIENT INSTRUCTIONS
I have prescribed Nystatin cream for the back of your left ear fanny and Acyclovir ointment for your buttock rash. Complete labs a week prior to 4 months follow up for annual medicare wellness exam.

## 2024-01-03 ENCOUNTER — HOSPITAL ENCOUNTER (OUTPATIENT)
Dept: RADIOLOGY | Facility: HOSPITAL | Age: 74
Discharge: HOME | End: 2024-01-03
Payer: MEDICARE

## 2024-01-03 DIAGNOSIS — C57.01 CARCINOMA OF RIGHT FALLOPIAN TUBE (MULTI): ICD-10-CM

## 2024-01-03 DIAGNOSIS — C56.3: ICD-10-CM

## 2024-01-03 PROCEDURE — 74177 CT ABD & PELVIS W/CONTRAST: CPT | Performed by: RADIOLOGY

## 2024-01-03 PROCEDURE — 74177 CT ABD & PELVIS W/CONTRAST: CPT

## 2024-01-03 PROCEDURE — 2550000001 HC RX 255 CONTRASTS: Performed by: INTERNAL MEDICINE

## 2024-01-03 RX ADMIN — IOHEXOL 75 ML: 350 INJECTION, SOLUTION INTRAVENOUS at 11:45

## 2024-01-10 ENCOUNTER — TELEPHONE (OUTPATIENT)
Dept: HEMATOLOGY/ONCOLOGY | Facility: CLINIC | Age: 74
End: 2024-01-10
Payer: COMMERCIAL

## 2024-01-10 DIAGNOSIS — C57.01 CARCINOMA OF RIGHT FALLOPIAN TUBE (MULTI): ICD-10-CM

## 2024-01-10 RX ORDER — MORPHINE SULFATE 60 MG/1
60 TABLET, FILM COATED, EXTENDED RELEASE ORAL 2 TIMES DAILY
Qty: 60 TABLET | Refills: 0 | Status: SHIPPED | OUTPATIENT
Start: 2024-01-10 | End: 2024-02-07 | Stop reason: SDUPTHER

## 2024-01-10 NOTE — TELEPHONE ENCOUNTER
-- DO NOT REPLY / DO NOT REPLY ALL --  -- Message is from Engagement Center Operations (ECO) --    General Patient Message: Patient returning call back to office regarding insurance,Marcofloresrichard contact Raulito Wiseman insurance to verify [phone] with Ref# [id number]    Caller Information       Type Contact Phone/Fax    11/06/2023 02:24 PM CST Phone (Incoming) Sylvie Pemberton (Self) [phone] (M)        Alternative phone number: [phone]  Can a detailed message be left? Yes    Message Turnaround:     Is it Working Hours? Yes - Working Hours     [place]:    Please give this turnaround time to the caller:   \"This message will be sent to [state Provider's name]. The clinical team will fulfill your request as soon as they review your message.\"                 Refill on Morphine, phone into [facility] in [place].

## 2024-01-10 NOTE — TELEPHONE ENCOUNTER
Oarrs report obtained, last refill verified, information forward to provider   Morphine last filled 12/10 for 30days

## 2024-01-16 ENCOUNTER — TELEPHONE (OUTPATIENT)
Dept: HEMATOLOGY/ONCOLOGY | Facility: CLINIC | Age: 74
End: 2024-01-16
Payer: COMMERCIAL

## 2024-01-16 NOTE — TELEPHONE ENCOUNTER
This patient has a head cold and not feeling well. Her daughter called to cancel FUV with Dr. Weiss. The daughter would like a call before appointment is schedule because she has multiple future appointments.

## 2024-01-17 ENCOUNTER — APPOINTMENT (OUTPATIENT)
Dept: HEMATOLOGY/ONCOLOGY | Facility: CLINIC | Age: 74
End: 2024-01-17
Payer: MEDICARE

## 2024-01-24 ENCOUNTER — TELEPHONE (OUTPATIENT)
Dept: PRIMARY CARE | Facility: CLINIC | Age: 74
End: 2024-01-24
Payer: COMMERCIAL

## 2024-01-24 DIAGNOSIS — G89.4 CHRONIC PAIN SYNDROME: ICD-10-CM

## 2024-01-24 DIAGNOSIS — C57.01 CARCINOMA OF RIGHT FALLOPIAN TUBE (MULTI): ICD-10-CM

## 2024-01-24 DIAGNOSIS — B37.2 CANDIDA INFECTION OF FLEXURAL SKIN: ICD-10-CM

## 2024-01-24 RX ORDER — NYSTATIN 100000 U/G
CREAM TOPICAL 2 TIMES DAILY
Qty: 60 G | Refills: 0 | Status: SHIPPED | OUTPATIENT
Start: 2024-01-24 | End: 2024-04-12 | Stop reason: ALTCHOICE

## 2024-01-24 RX ORDER — OXYCODONE HYDROCHLORIDE 10 MG/1
10 TABLET ORAL EVERY 6 HOURS PRN
Qty: 120 TABLET | Refills: 0 | Status: SHIPPED | OUTPATIENT
Start: 2024-01-24 | End: 2024-03-02 | Stop reason: SDUPTHER

## 2024-01-24 NOTE — TELEPHONE ENCOUNTER
Needs a refill on her Acyclovir 5% applies 5 times a day & Nystatin applies it 2 times a day Please send to [facility]

## 2024-01-24 NOTE — TELEPHONE ENCOUNTER
Oarrs report obtained, last refill verified, information forward to provider   Oxycodone last refilled on 12/20

## 2024-02-01 DIAGNOSIS — I63.512 ACUTE ISCHEMIC LEFT MCA STROKE (MULTI): ICD-10-CM

## 2024-02-01 RX ORDER — ATORVASTATIN CALCIUM 80 MG/1
80 TABLET, FILM COATED ORAL NIGHTLY
Qty: 90 TABLET | Refills: 3 | Status: SHIPPED | OUTPATIENT
Start: 2024-02-01

## 2024-02-06 ENCOUNTER — OFFICE VISIT (OUTPATIENT)
Dept: HEMATOLOGY/ONCOLOGY | Facility: CLINIC | Age: 74
End: 2024-02-06
Payer: MEDICARE

## 2024-02-06 VITALS
SYSTOLIC BLOOD PRESSURE: 104 MMHG | OXYGEN SATURATION: 96 % | BODY MASS INDEX: 32.72 KG/M2 | RESPIRATION RATE: 16 BRPM | TEMPERATURE: 98.2 F | DIASTOLIC BLOOD PRESSURE: 65 MMHG | HEIGHT: 71 IN | HEART RATE: 60 BPM | WEIGHT: 233.69 LBS

## 2024-02-06 DIAGNOSIS — C57.01 CARCINOMA OF RIGHT FALLOPIAN TUBE (MULTI): ICD-10-CM

## 2024-02-06 DIAGNOSIS — R53.1 GENERAL WEAKNESS: ICD-10-CM

## 2024-02-06 DIAGNOSIS — C56.3: ICD-10-CM

## 2024-02-06 LAB
ALBUMIN SERPL BCP-MCNC: 3.9 G/DL (ref 3.4–5)
ALP SERPL-CCNC: 56 U/L (ref 33–136)
ALT SERPL W P-5'-P-CCNC: 11 U/L (ref 7–45)
ANION GAP SERPL CALC-SCNC: 11 MMOL/L (ref 10–20)
AST SERPL W P-5'-P-CCNC: 16 U/L (ref 9–39)
BASOPHILS # BLD AUTO: 0.02 X10*3/UL (ref 0–0.1)
BASOPHILS NFR BLD AUTO: 0.4 %
BILIRUB SERPL-MCNC: 0.5 MG/DL (ref 0–1.2)
BUN SERPL-MCNC: 19 MG/DL (ref 6–23)
CALCIUM SERPL-MCNC: 9.4 MG/DL (ref 8.6–10.3)
CHLORIDE SERPL-SCNC: 103 MMOL/L (ref 98–107)
CO2 SERPL-SCNC: 29 MMOL/L (ref 21–32)
CREAT SERPL-MCNC: 1.17 MG/DL (ref 0.5–1.05)
EGFRCR SERPLBLD CKD-EPI 2021: 50 ML/MIN/1.73M*2
EOSINOPHIL # BLD AUTO: 0.12 X10*3/UL (ref 0–0.4)
EOSINOPHIL NFR BLD AUTO: 2.5 %
ERYTHROCYTE [DISTWIDTH] IN BLOOD BY AUTOMATED COUNT: 15.4 % (ref 11.5–14.5)
GLUCOSE SERPL-MCNC: 157 MG/DL (ref 74–99)
HCT VFR BLD AUTO: 35.4 % (ref 36–46)
HGB BLD-MCNC: 10.7 G/DL (ref 12–16)
IMM GRANULOCYTES # BLD AUTO: 0.01 X10*3/UL (ref 0–0.5)
IMM GRANULOCYTES NFR BLD AUTO: 0.2 % (ref 0–0.9)
LYMPHOCYTES # BLD AUTO: 1.79 X10*3/UL (ref 0.8–3)
LYMPHOCYTES NFR BLD AUTO: 36.7 %
MCH RBC QN AUTO: 25.5 PG (ref 26–34)
MCHC RBC AUTO-ENTMCNC: 30.2 G/DL (ref 32–36)
MCV RBC AUTO: 84 FL (ref 80–100)
MONOCYTES # BLD AUTO: 0.39 X10*3/UL (ref 0.05–0.8)
MONOCYTES NFR BLD AUTO: 8 %
NEUTROPHILS # BLD AUTO: 2.55 X10*3/UL (ref 1.6–5.5)
NEUTROPHILS NFR BLD AUTO: 52.2 %
PLATELET # BLD AUTO: 154 X10*3/UL (ref 150–450)
POTASSIUM SERPL-SCNC: 4.1 MMOL/L (ref 3.5–5.3)
PROT SERPL-MCNC: 7.1 G/DL (ref 6.4–8.2)
RBC # BLD AUTO: 4.2 X10*6/UL (ref 4–5.2)
SODIUM SERPL-SCNC: 139 MMOL/L (ref 136–145)
WBC # BLD AUTO: 4.9 X10*3/UL (ref 4.4–11.3)

## 2024-02-06 PROCEDURE — 3078F DIAST BP <80 MM HG: CPT | Performed by: INTERNAL MEDICINE

## 2024-02-06 PROCEDURE — 3008F BODY MASS INDEX DOCD: CPT | Performed by: INTERNAL MEDICINE

## 2024-02-06 PROCEDURE — 1159F MED LIST DOCD IN RCRD: CPT | Performed by: INTERNAL MEDICINE

## 2024-02-06 PROCEDURE — 99214 OFFICE O/P EST MOD 30 MIN: CPT | Performed by: INTERNAL MEDICINE

## 2024-02-06 PROCEDURE — 99214 OFFICE O/P EST MOD 30 MIN: CPT | Mod: 25 | Performed by: INTERNAL MEDICINE

## 2024-02-06 PROCEDURE — 36415 COLL VENOUS BLD VENIPUNCTURE: CPT | Performed by: INTERNAL MEDICINE

## 2024-02-06 PROCEDURE — 4010F ACE/ARB THERAPY RXD/TAKEN: CPT | Performed by: INTERNAL MEDICINE

## 2024-02-06 PROCEDURE — 1036F TOBACCO NON-USER: CPT | Performed by: INTERNAL MEDICINE

## 2024-02-06 PROCEDURE — 1125F AMNT PAIN NOTED PAIN PRSNT: CPT | Performed by: INTERNAL MEDICINE

## 2024-02-06 PROCEDURE — 1157F ADVNC CARE PLAN IN RCRD: CPT | Performed by: INTERNAL MEDICINE

## 2024-02-06 PROCEDURE — 3074F SYST BP LT 130 MM HG: CPT | Performed by: INTERNAL MEDICINE

## 2024-02-06 RX ORDER — CALCIUM CARBONATE 160(400)MG
1 TABLET,CHEWABLE ORAL SEE ADMIN INSTRUCTIONS
Qty: 1 EACH | Refills: 0 | Status: SHIPPED | OUTPATIENT
Start: 2024-02-06

## 2024-02-06 ASSESSMENT — PATIENT HEALTH QUESTIONNAIRE - PHQ9
2. FEELING DOWN, DEPRESSED OR HOPELESS: NOT AT ALL
SUM OF ALL RESPONSES TO PHQ9 QUESTIONS 1 AND 2: 0
1. LITTLE INTEREST OR PLEASURE IN DOING THINGS: NOT AT ALL

## 2024-02-06 ASSESSMENT — PAIN SCALES - GENERAL: PAINLEVEL: 4

## 2024-02-06 NOTE — PROGRESS NOTES
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   Presented with right lower quadrant pain in late December that was initially attributed to gallstones.  On further work-up by her surgeon, ultrasound of the pelvis showed bilateral ovarian mass lesions measuring 6.4 cm on the right and 4.3 cm in the left.   CA-125: 24.0.  CT chest showed small lesions in right lung measuring 0.4 cm in RLL and 0.5 cm in RML.  Mammogram was negative.   3/1/2021: BSO, omentectomy and biopsies.  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 pelvic mass.  Left ovary/tube, omentum: negative.  0/8 nodes positive.   Adj. chemo recommended.   4/5/2021: : 15.5  4/12/2021: Cycle #1/6 adjuvant chemotherapy with paclitaxel/carboplatin  5/3/21: #2 Carbo/Taxol  5/24/21: #3 Carbo/taxol  6/21/21: #4/6 carbo/Taxol.  7/13/2021: #5/6 carbo/Taxol.  8/3/2021: #6/6 carbo/Taxol  8/11/2021: CA-125: 8.5.  1/10/2022: : 9.2.   4/28/2022: : 40.9.  5/16/2022: CT abdomen pelvis: Increased adenopathy consistent with progressive disease.  6/22/2022: : 41.0.  7/15/2022: #1 carboplatin/gemcitabine.  8/12/2022: #2 carboplatin/gemcitabine. D8 chemo was held because of pancytopenia  9/1/2022: : 13.9.  9/9/22      C3D1 , no D8  9/30/22    C4, carboplatin and gemcitabine with dose reduction, no day 8 chemo  10/13/22 , CT of A/P ,  Cystic mass noted  along the right posterior liver has increased in size, however all of  the othermesenteric and pelvic metastatic implants appear to be  decreased. No new sites of metastatic disease.  10/21/22   C5D1  11/11/22  C6D1  2/11/23 , CT of CAP , CHEST  1.  Stablechronic changes without evidence of metastatic disease  within the chest.   ABDOMEN - PELVIS  1.  Status post hysterectomy and bilateral salpingo-oophorectomy,  with similar to mildly  decreased appearance of peritoneal and  mesenteric deposits as described above. No visualized new metastatic  deposits. Maximum decrease is seen in the cystic lesion adjacent to  the inferior margin of the liver parenchyma.  2023: : 11.1 (normal).  23 , CT of abdominal pelvis, peritoneal deposits slightly increased in size      2023, right chest wall subcutaneous nodule, needle biopsy negative for malignancy  23 , PET , 1. FDG avid soft tissue implants in the left aspect of the pelvis, similar in size to abdominopelvic CT dated 2023, and remain concerning for metastatic disease. 2. Newly developed FDG avid soft tissue in the right aspect of the  pelvis may reflect a metastatic lymph node versus metastatic implant.     24 , CT of abdominal pelvis, peritoneal metastasis slightly decreased in size and stable as compared to previous PET scan done in 2023        History of Present Illness:      ID Statement:    JERMAINE SOTO is a 73 year old Female        Chief Complaint: Ovarian cancer   Interval History:    Jermaine Soto returns today for follow-up and treatment of ovarian/fallopian tube cancer.  She complains of weakness and fatigue, no nausea, something patient lower  abdominal pain, recent CAT scan did show stable disease, discussed with the patient    Past medical history: Ovarian/fallopian tube cancer, stage IIA, s/p BSO, omentectomy as described above. Last chemotherapy was given on 2022.  CT scans stable/improved.   levels have been normal. .  History hypertension, diabetes, diabetic neuropathy, low back pain, degenerative joint disease, tonsillectomy, vaginal hysterectomy  for benign disease, Nissen fundoplication,  4 back surgeries, ankle/foot surgeries.  Had biopsy of benign left breast lesion.  No other history of malignancy, MI, CVA or VTE disease.      Family medical history: Mother, father and brother had colon cancer.  Brother  had brain tumor.  No family history of breast, ovarian or other gynecologic  cancer.      Social history: Former smoker, quit 1973.  Remote history alcohol use, quit 1973.     Review of Systems:   Review of Systems:    Constitutional: No fever, chills, night sweats.  Some fatigue.  Head and neck: No headaches or dizziness.  No pain, stiffness.   HEENT: No sore throat, sinusitis.  Hearing is adequate. Vison is adequate.   Cardiac: No chest pain, palpitations, lightheadedness.   Left lateral chest wall pain, intermittent.  Respiratory: No increased dyspnea, cough, hemoptysis.   GI: Appetite is good, weight stable.  Occasional lower abdominal discomfort.  Some constipation, diarrhea, change in bowel movements, following treatment.  No melena, hematochezia, nausea, vomiting.  Genitourinary: No frequency, urgency.  No polyuria, dysuria, hematuria.   Musculoskeletal: Complains of mild arthralgias.    Endocrine: History diabetes, no thyroid disease.  Skin: No rash, skin lesions, itching.  Alopecia resolving.  Neuromuscular: No lightheadedness, dizziness.  No history of seizure.  Had some numbness, paresthesias in her feet, slowly improving.  No focal weakness, tremor.                    Allergies and Intolerances:       Allergies:         Bactrim: Drug, Itching (Severe), Rash (Severe), Active     Outpatient Medication Profile:  * Patient Currently Takes Medications as of 13-Sep-2023 12:39 documented in Structured Notes         atorvastatin 80 mg oral tablet : Last Dose Taken:  , 1 tab(s) orally once (at bedtime), Start Date: 05-Jul-2023         apixaban 5 mg oral tablet: Last Dose Taken:  , 1 tab(s) orally every  12 hours, Start Date: 05-Jul-2023         morphine 30 mg/8 to 12 hr oral tablet, extended release: Last Dose Taken:   , 1 tab(s) orally every 8 hours , Start Date: 24-May-2023         meclizine 25 mg oral tablet: Last Dose Taken:  , 1 tab(s) orally 3 times  a day as needed for dizziness, Start Date: 16-Jan-2023          glimepiride 2 mg oral tablet: Last Dose Taken:  , 1 tab(s) orally once  a day         Allergy Relief 10 mg oral tablet: Last Dose Taken:  , 1 tab(s) orally  once a day         Zinc: Last Dose Taken:  , 50 mg oral once a day         tiZANidine 4 mg oral tablet: Last Dose Taken:  , 2 tab(s) oral prn         Aspir 81 oral delayed release tablet: Last Dose Taken:  , 1 tab(s) orally  once a day         bisoprolol-hydrochlorothiazide 5 mg-6.25 mg oral tablet: Last Dose Taken:   , 1 tab(s) orally once a day         Vitamin C 100 mg oral tablet: Last Dose Taken:  , 2 tab(s) orally 3 times  a day         Vitamin D3 125 mcg (5000 intl units) oral tablet: Last Dose Taken:  ,  1 tab(s) orally 2 times a day         magnesium carbonate 250 mg oral capsule: Last Dose Taken:           lisinopril 10 mg oral tablet: Last Dose Taken:  , 1 cap(s) oral once  a day         omeprazole 40 mg oral delayed release capsule: Last Dose Taken:  , 1  cap(s) oral once a day         senna 8.6 mg oral tablet: Last Dose Taken:  , 1 cap(s) oral once a day         gabapentin 300 mg oral capsule: Last Dose Taken:  , 1 cap(s) oral 3 times  a day         albuterol 2.5 mg/3 mL (0.083%) inhalation solution: Last Dose Taken:   , 1 INHL inhalation once a day             Medical History:         Encounter for antineoplastic chemotherapy: ICD-10: Z51.11,  Status: Active     Family History: No Family History items are recorded  in the problem list.      Social History:   Social Substance History:  ·  Smoking Status former smoker (1)            Vitals and Measurements:   Vitals: Temp: 36.3  HR: 75  RR: 16  BP: 106/72  SPO2%:   94   Measurements: HT(cm): 178.2  WT(kg): 102.3  BSA:  2.25  BMI:  32.2   Last 3 Weights & Heights: Date:                           Weight/Scale Type:                    Height:   13-Sep-2023 12:27                102.3  kg                     178.2  cm  28-Jun-2023 08:59                104.7  kg                     178.2   cm  25-May-2023 13:19                108  kg                     178.2  cm      Physical Exam:      Constitutional: Well developed, awake/alert/oriented  x3.   Eyes: PERRL, EOMI, clear sclera.   ENMT: No external lesions.   Head/Neck: Neck with normal movement.  No mass, adenopathy  or tenderness.  No JVD. Trachea midline.   Respiratory/Thorax: Thorax symmetric. Clear to auscultation.   No wheezes, rales, rhonchi.  Mild tenderness palpation left lower anterolateral chest wall/ribs, but no mass lesion, skin lesions.   Cardiovascular: Regular, rate and rhythm. No murmur.   No rubs or gallops. Normal S1, S2.   Gastrointestinal: Nondistended.  Healed abdominal  incision.  No masses palpable.  No palpable hepatomegaly, splenomegaly.   Lower abdominal pain on palpation   Musculoskeletal: Mild joint deformities.   Extremities: Unremarkable extremities; no cyanosis,   No edema.   Neurological: Alert and oriented x3.   Lymphatic: No palpably significant lymphadenopathy  in the neck, supraclavicular areas.   Psychological: Appropriate mood and behavior.   Skin: Warm and dry, no rashes, no suspicious lesions.         Lab Results:       ·  Results        CT of abdomen and pelvis,1.  Peritoneal metastases as described have decreased from the  previous CT scan of 05/30/2023, but appear comparable to the PET  study of 09/08/2023. the same is true for several right common iliac  nodes.             Assessment and Plan:      Assessment and Plan:   Assessment:    1.  High-grade serous carcinoma right ovary/fallopian tube/, status postpelvic mass BSO, omentectomy, pelvic washings, FIGO stage IIa (pT2a pN0 cM0).  Status post  6 cycles adjuvant chemotherapy with paclitaxel/carboplatin.  Had significant musculoskeletal pain with Taxol infusions.  Ca-125 level elevated at 40.9 (previously normal).  CT shows evidence of progression.  Symptomatic. s/p 4 cycles carboplatin/gemcitabine  October 13, 2022 CAT scan abdominal pelvis did show  omental mass with decrease in size, patient is receiving only grade 1 chemotherapy including carboplatin and gemcitabine because of pancytopenia  11/11/22: Last dose of carboplatin and gemcitabine  2/11/23: CT of CAP: Chest- stable disease, no evidence of progression of disease, peritoneal nodule measuring 3 cm which is stable.  5/19/2023: : 11.1.  June 6, 2023, CT of  abdominal pelvis, peritoneal deposit slightly increased in size     9/09/23 , PET , 1. FDG avid soft tissue implants in the left aspect of the pelvis, similar in size to abdominopelvic CT dated 05/30/2023, and remain concerning for metastatic disease. 2. Newly developed FDG avid soft tissue in the right aspect of the  pelvis may reflect a metastatic lymph node versus metastatic implant.   1/4/24 , CT of A/P , stable  2.  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      3.  Multiple medical problems.      4.  Abdominal pain, likely secondary to malignancy versus benign disease.  Controlled with MS Contin/oxycodone.     5.  History small right lung nodules of uncertain significance.     6.  Left lateral chest wall pain, intermittent, likely benign, rule out malignancy..     Plan:   No change in clinically status, CAT scan result discussed with the patient patient has metastatic ovarian cancer which is stable.  I will continue to monitor clinically repeat  every 3 months.  We will discuss third line treatment option if the patient has obvious progression of disease.  No change in pain management.     Greater than3 0 minutes time spent with patient reviewing diagnostic studies, discussing diagnosis and treatment plan and documenting in EMR.

## 2024-02-06 NOTE — PATIENT INSTRUCTIONS
Follow up with Dr. Weiss in 3 months.     Please have lab work completed at least 1 day prior to follow up appointment.

## 2024-02-07 ENCOUNTER — TELEPHONE (OUTPATIENT)
Dept: CASE MANAGEMENT | Facility: HOSPITAL | Age: 74
End: 2024-02-07
Payer: COMMERCIAL

## 2024-02-07 ENCOUNTER — TELEPHONE (OUTPATIENT)
Dept: HEMATOLOGY/ONCOLOGY | Facility: CLINIC | Age: 74
End: 2024-02-07
Payer: COMMERCIAL

## 2024-02-07 DIAGNOSIS — C57.01 CARCINOMA OF RIGHT FALLOPIAN TUBE (MULTI): ICD-10-CM

## 2024-02-07 RX ORDER — MORPHINE SULFATE 60 MG/1
60 TABLET, FILM COATED, EXTENDED RELEASE ORAL 2 TIMES DAILY
Qty: 60 TABLET | Refills: 0 | Status: SHIPPED | OUTPATIENT
Start: 2024-02-07 | End: 2024-03-06 | Stop reason: SDUPTHER

## 2024-02-07 NOTE — TELEPHONE ENCOUNTER
Patient had a follow up appointment with Dr. Weiss yesterday.  Patient is interested in getting a Rollator.  JOSUE called patient to see if she has a DME company she would like SW to call.  SW had to leave a message.  JOSUE provided phone number.    ROMREO Hays     2/9/24-JOSUE left another message for patient to find out if she had a chose for rollator to be ordered from.  JOSUE provided phone number.    ROMERO Hays     Patient returned SW phone call.  Patient did not have a preference on the DME company to use.  JOSUE faxed to [facility].    ROMERO Hays     2/19/24-JOSUE left patient a message to see if [facility] called her regarding her rollator.  JOSUE had to leave a message.    Jorge Alberto HUNTER, BRETW

## 2024-02-07 NOTE — TELEPHONE ENCOUNTER
Oarrs report obtained, last refill verified, information forward to provider   Last refill of morphine was on 1/10

## 2024-02-15 ENCOUNTER — TELEPHONE (OUTPATIENT)
Dept: HEMATOLOGY/ONCOLOGY | Facility: CLINIC | Age: 74
End: 2024-02-15
Payer: COMMERCIAL

## 2024-02-15 NOTE — TELEPHONE ENCOUNTER
Spoke with Antoine's daughter Tiffanie who called about the tumor marker level. Results from December were given, Antoine did not have level drawn in Feb when she was here.  Per dr. Weiss's note she is to have level drawn every 3mons. Next marker due in march. Tiffanie verbalized understanding with teach back

## 2024-03-01 ENCOUNTER — TELEPHONE (OUTPATIENT)
Dept: HEMATOLOGY/ONCOLOGY | Facility: CLINIC | Age: 74
End: 2024-03-01
Payer: COMMERCIAL

## 2024-03-01 DIAGNOSIS — G89.4 CHRONIC PAIN SYNDROME: ICD-10-CM

## 2024-03-01 DIAGNOSIS — C57.01 CARCINOMA OF RIGHT FALLOPIAN TUBE (MULTI): ICD-10-CM

## 2024-03-01 NOTE — TELEPHONE ENCOUNTER
OARRS report obtained. Verified last refill date and quantity. Information forward to provider.   Last filled 1/24/2024 for 30 days

## 2024-03-02 RX ORDER — OXYCODONE HYDROCHLORIDE 10 MG/1
10 TABLET ORAL EVERY 6 HOURS PRN
Qty: 120 TABLET | Refills: 0 | Status: SHIPPED | OUTPATIENT
Start: 2024-03-02 | End: 2024-03-06 | Stop reason: SDUPTHER

## 2024-03-06 ENCOUNTER — TELEPHONE (OUTPATIENT)
Dept: HEMATOLOGY/ONCOLOGY | Facility: CLINIC | Age: 74
End: 2024-03-06
Payer: COMMERCIAL

## 2024-03-06 ENCOUNTER — TELEPHONE (OUTPATIENT)
Dept: PALLIATIVE MEDICINE | Facility: CLINIC | Age: 74
End: 2024-03-06
Payer: COMMERCIAL

## 2024-03-06 DIAGNOSIS — G89.4 CHRONIC PAIN SYNDROME: ICD-10-CM

## 2024-03-06 DIAGNOSIS — C57.01 CARCINOMA OF RIGHT FALLOPIAN TUBE (MULTI): ICD-10-CM

## 2024-03-06 RX ORDER — OXYCODONE HYDROCHLORIDE 10 MG/1
10 TABLET ORAL EVERY 6 HOURS PRN
Qty: 120 TABLET | Refills: 0 | Status: SHIPPED | OUTPATIENT
Start: 2024-03-06 | End: 2024-04-09 | Stop reason: SDUPTHER

## 2024-03-06 RX ORDER — MORPHINE SULFATE 60 MG/1
60 TABLET, FILM COATED, EXTENDED RELEASE ORAL 2 TIMES DAILY
Qty: 60 TABLET | Refills: 0 | Status: SHIPPED | OUTPATIENT
Start: 2024-03-06 | End: 2024-04-12 | Stop reason: SDUPTHER

## 2024-03-13 DIAGNOSIS — Z76.0 MEDICATION REFILL: ICD-10-CM

## 2024-03-13 RX ORDER — OMEPRAZOLE 40 MG/1
40 CAPSULE, DELAYED RELEASE ORAL DAILY
Qty: 90 CAPSULE | Refills: 3 | Status: SHIPPED | OUTPATIENT
Start: 2024-03-13

## 2024-03-15 ENCOUNTER — LAB (OUTPATIENT)
Dept: LAB | Facility: HOSPITAL | Age: 74
End: 2024-03-15
Payer: MEDICARE

## 2024-03-15 DIAGNOSIS — C57.01 CARCINOMA OF RIGHT FALLOPIAN TUBE (MULTI): ICD-10-CM

## 2024-03-15 DIAGNOSIS — R53.1 GENERAL WEAKNESS: ICD-10-CM

## 2024-03-15 DIAGNOSIS — C56.3: ICD-10-CM

## 2024-03-15 LAB — CANCER AG125 SERPL-ACNC: 10.4 U/ML (ref 0–30.2)

## 2024-03-15 PROCEDURE — 36415 COLL VENOUS BLD VENIPUNCTURE: CPT

## 2024-03-15 PROCEDURE — 86304 IMMUNOASSAY TUMOR CA 125: CPT | Mod: PORLAB | Performed by: INTERNAL MEDICINE

## 2024-03-18 ENCOUNTER — TELEPHONE (OUTPATIENT)
Dept: CASE MANAGEMENT | Facility: HOSPITAL | Age: 74
End: 2024-03-18
Payer: COMMERCIAL

## 2024-03-18 NOTE — TELEPHONE ENCOUNTER
(JOSUE) ran into patient and she asked about her rollator.  SW called [facility] at [phone] and spoke with Shazia.  Shazia stated that they have left some messages for patient.  Patient will owe $15.52 and then they can ship out the rollator.      SW called patient and spoke with her daughter.  SW provided update about patient's rollater.  SW provided phone number.    Jorge Alberto Nash MSW, LSW

## 2024-03-21 DIAGNOSIS — E11.40 CONTROLLED TYPE 2 DIABETES MELLITUS WITH DIABETIC NEUROPATHY, WITHOUT LONG-TERM CURRENT USE OF INSULIN (MULTI): ICD-10-CM

## 2024-03-21 RX ORDER — BLOOD-GLUCOSE METER
1 KIT MISCELLANEOUS 2 TIMES DAILY
Qty: 200 STRIP | Refills: 2 | Status: SHIPPED | OUTPATIENT
Start: 2024-03-21

## 2024-03-21 NOTE — TELEPHONE ENCOUNTER
----- Message from Antoine Romero sent at 3/21/2024  2:20 PM EDT -----  Regarding: Free style test strips  Contact: [phone]  Hello,     I need a prescription for my freestyle test strip.  Please call to [facility] in [place].    Thank you  Antoine Romero

## 2024-03-26 DIAGNOSIS — I10 HYPERTENSION, UNSPECIFIED TYPE: ICD-10-CM

## 2024-03-26 RX ORDER — LISINOPRIL 5 MG/1
5 TABLET ORAL DAILY
Qty: 90 TABLET | Refills: 3 | Status: SHIPPED | OUTPATIENT
Start: 2024-03-26 | End: 2024-04-12 | Stop reason: ALTCHOICE

## 2024-03-26 NOTE — TELEPHONE ENCOUNTER
----- Message from Antoine Romero sent at 3/26/2024  1:02 PM EDT -----  Regarding: Lisinopril refill  Contact: [phone]  Hello, I need my Lisinopril 5 mg tablets refilled.  Please send to [facility] in [place].    Thank you,  Antoine Romero

## 2024-04-08 ENCOUNTER — TELEPHONE (OUTPATIENT)
Dept: HEMATOLOGY/ONCOLOGY | Facility: CLINIC | Age: 74
End: 2024-04-08
Payer: COMMERCIAL

## 2024-04-08 DIAGNOSIS — C57.01 CARCINOMA OF RIGHT FALLOPIAN TUBE (MULTI): ICD-10-CM

## 2024-04-08 DIAGNOSIS — G89.4 CHRONIC PAIN SYNDROME: ICD-10-CM

## 2024-04-08 NOTE — TELEPHONE ENCOUNTER
Antoine called for 2 refills. The first one is Oxycodone and for morphine. She gets them both at [facility]

## 2024-04-09 RX ORDER — OXYCODONE HYDROCHLORIDE 10 MG/1
10 TABLET ORAL EVERY 6 HOURS PRN
Qty: 120 TABLET | Refills: 0 | Status: SHIPPED | OUTPATIENT
Start: 2024-04-09 | End: 2024-04-12 | Stop reason: WASHOUT

## 2024-04-09 RX ORDER — OXYCODONE HYDROCHLORIDE 10 MG/1
10 TABLET ORAL EVERY 6 HOURS PRN
Qty: 120 TABLET | Refills: 0 | Status: SHIPPED | OUTPATIENT
Start: 2024-04-09 | End: 2024-05-06 | Stop reason: SDUPTHER

## 2024-04-12 ENCOUNTER — OFFICE VISIT (OUTPATIENT)
Dept: PRIMARY CARE | Facility: CLINIC | Age: 74
End: 2024-04-12
Payer: MEDICARE

## 2024-04-12 ENCOUNTER — TELEPHONE (OUTPATIENT)
Dept: HEMATOLOGY/ONCOLOGY | Facility: CLINIC | Age: 74
End: 2024-04-12

## 2024-04-12 VITALS
BODY MASS INDEX: 31.83 KG/M2 | HEART RATE: 84 BPM | HEIGHT: 72 IN | OXYGEN SATURATION: 93 % | DIASTOLIC BLOOD PRESSURE: 70 MMHG | RESPIRATION RATE: 18 BRPM | WEIGHT: 235 LBS | SYSTOLIC BLOOD PRESSURE: 122 MMHG

## 2024-04-12 DIAGNOSIS — C57.01 CARCINOMA OF RIGHT FALLOPIAN TUBE (MULTI): ICD-10-CM

## 2024-04-12 DIAGNOSIS — J06.9 ACUTE URI OF MULTIPLE SITES: Primary | ICD-10-CM

## 2024-04-12 DIAGNOSIS — Z00.00 MEDICARE ANNUAL WELLNESS VISIT, SUBSEQUENT: ICD-10-CM

## 2024-04-12 DIAGNOSIS — I10 HYPERTENSION, UNSPECIFIED TYPE: ICD-10-CM

## 2024-04-12 PROCEDURE — 3078F DIAST BP <80 MM HG: CPT | Performed by: NURSE PRACTITIONER

## 2024-04-12 PROCEDURE — 1159F MED LIST DOCD IN RCRD: CPT | Performed by: NURSE PRACTITIONER

## 2024-04-12 PROCEDURE — 99213 OFFICE O/P EST LOW 20 MIN: CPT | Performed by: NURSE PRACTITIONER

## 2024-04-12 PROCEDURE — 3074F SYST BP LT 130 MM HG: CPT | Performed by: NURSE PRACTITIONER

## 2024-04-12 PROCEDURE — 1160F RVW MEDS BY RX/DR IN RCRD: CPT | Performed by: NURSE PRACTITIONER

## 2024-04-12 PROCEDURE — 4010F ACE/ARB THERAPY RXD/TAKEN: CPT | Performed by: NURSE PRACTITIONER

## 2024-04-12 PROCEDURE — 1125F AMNT PAIN NOTED PAIN PRSNT: CPT | Performed by: NURSE PRACTITIONER

## 2024-04-12 PROCEDURE — 1157F ADVNC CARE PLAN IN RCRD: CPT | Performed by: NURSE PRACTITIONER

## 2024-04-12 PROCEDURE — 3008F BODY MASS INDEX DOCD: CPT | Performed by: NURSE PRACTITIONER

## 2024-04-12 RX ORDER — ALBUTEROL SULFATE 90 UG/1
2 AEROSOL, METERED RESPIRATORY (INHALATION) EVERY 6 HOURS PRN
Qty: 18 G | Refills: 0 | Status: SHIPPED | OUTPATIENT
Start: 2024-04-12

## 2024-04-12 RX ORDER — ACETAMINOPHEN 500 MG
2 TABLET ORAL DAILY
COMMUNITY

## 2024-04-12 RX ORDER — AMOXICILLIN AND CLAVULANATE POTASSIUM 875; 125 MG/1; MG/1
875 TABLET, FILM COATED ORAL 2 TIMES DAILY
Qty: 20 TABLET | Refills: 0 | Status: SHIPPED | OUTPATIENT
Start: 2024-04-12 | End: 2024-04-22

## 2024-04-12 RX ORDER — LANCETS 28 GAUGE
EACH MISCELLANEOUS
COMMUNITY
Start: 2024-02-01

## 2024-04-12 RX ORDER — MORPHINE SULFATE 60 MG/1
60 TABLET, FILM COATED, EXTENDED RELEASE ORAL 2 TIMES DAILY
Qty: 60 TABLET | Refills: 0 | Status: SHIPPED | OUTPATIENT
Start: 2024-04-12 | End: 2024-05-06 | Stop reason: SDUPTHER

## 2024-04-12 RX ORDER — LISINOPRIL 5 MG/1
5 TABLET ORAL DAILY
Qty: 90 TABLET | Refills: 3 | Status: SHIPPED | OUTPATIENT
Start: 2024-04-12 | End: 2025-04-07

## 2024-04-12 RX ORDER — METFORMIN HYDROCHLORIDE 500 MG/1
TABLET ORAL EVERY 12 HOURS
COMMUNITY
Start: 2020-10-19 | End: 2024-04-24

## 2024-04-12 RX ORDER — ALBUTEROL SULFATE 90 UG/1
2 AEROSOL, METERED RESPIRATORY (INHALATION) EVERY 4 HOURS PRN
Qty: 8 G | Refills: 0 | Status: SHIPPED | OUTPATIENT
Start: 2024-04-12 | End: 2024-05-30 | Stop reason: WASHOUT

## 2024-04-12 ASSESSMENT — ENCOUNTER SYMPTOMS
DEPRESSION: 0
LOSS OF SENSATION IN FEET: 1
OCCASIONAL FEELINGS OF UNSTEADINESS: 1

## 2024-04-12 ASSESSMENT — PATIENT HEALTH QUESTIONNAIRE - PHQ9
1. LITTLE INTEREST OR PLEASURE IN DOING THINGS: NOT AT ALL
10. IF YOU CHECKED OFF ANY PROBLEMS, HOW DIFFICULT HAVE THESE PROBLEMS MADE IT FOR YOU TO DO YOUR WORK, TAKE CARE OF THINGS AT HOME, OR GET ALONG WITH OTHER PEOPLE: NOT DIFFICULT AT ALL
2. FEELING DOWN, DEPRESSED OR HOPELESS: SEVERAL DAYS
SUM OF ALL RESPONSES TO PHQ9 QUESTIONS 1 AND 2: 1

## 2024-04-12 ASSESSMENT — ANXIETY QUESTIONNAIRES
2. NOT BEING ABLE TO STOP OR CONTROL WORRYING: NOT AT ALL
5. BEING SO RESTLESS THAT IT IS HARD TO SIT STILL: NOT AT ALL
4. TROUBLE RELAXING: NOT AT ALL
1. FEELING NERVOUS, ANXIOUS, OR ON EDGE: NOT AT ALL
7. FEELING AFRAID AS IF SOMETHING AWFUL MIGHT HAPPEN: NOT AT ALL
6. BECOMING EASILY ANNOYED OR IRRITABLE: NOT AT ALL
3. WORRYING TOO MUCH ABOUT DIFFERENT THINGS: NOT AT ALL
GAD7 TOTAL SCORE: 0

## 2024-04-12 ASSESSMENT — COLUMBIA-SUICIDE SEVERITY RATING SCALE - C-SSRS
1. IN THE PAST MONTH, HAVE YOU WISHED YOU WERE DEAD OR WISHED YOU COULD GO TO SLEEP AND NOT WAKE UP?: NO
6. HAVE YOU EVER DONE ANYTHING, STARTED TO DO ANYTHING, OR PREPARED TO DO ANYTHING TO END YOUR LIFE?: NO
2. HAVE YOU ACTUALLY HAD ANY THOUGHTS OF KILLING YOURSELF?: NO

## 2024-04-12 ASSESSMENT — PAIN SCALES - GENERAL: PAINLEVEL: 6

## 2024-04-12 NOTE — TELEPHONE ENCOUNTER
Oarrs report obtained, last refill verified, information forward to provider   Morphine last refilled 3/7

## 2024-04-12 NOTE — PROGRESS NOTES
Subjective   Patient ID: Antoine Romero is a 73 y.o. female who presents for Cough (Green mucous x 1 weeks, sinus and chest congestion, Headaches, diarrhea).    Patient is in a wheelchair accompanied by her daughter presenting with multiple complaints that started about 2 weeks ago and includes malaise, fatigue, sinus pain/pressure/congestion, mucopurulent nasal discharge, chest congestion, increased dyspnea and wheezing.  She has not taken any over-the-counter medication for symptoms.  She denies fever or chest pain.         Review of Systems   HENT:  Positive for congestion, facial swelling, postnasal drip, rhinorrhea, sinus pressure and sinus pain.    Respiratory:  Positive for shortness of breath and wheezing.    All other systems reviewed and are negative.      Objective   /70 (BP Location: Left arm, Patient Position: Sitting, BP Cuff Size: Adult)   Pulse 84   Resp 18   Ht 1.829 m (6')   Wt 107 kg (235 lb)   SpO2 93%   BMI 31.87 kg/m²     Physical Exam  Vitals reviewed.   Constitutional:       Appearance: Normal appearance.   HENT:      Head: Normocephalic and atraumatic.      Right Ear: External ear normal.      Left Ear: External ear normal.      Nose: Nose normal.      Mouth/Throat:      Mouth: Mucous membranes are moist.   Cardiovascular:      Rate and Rhythm: Normal rate.      Pulses: Normal pulses.      Heart sounds: Normal heart sounds.   Pulmonary:      Effort: Pulmonary effort is normal.      Breath sounds: Normal breath sounds.   Musculoskeletal:      Cervical back: Neck supple.   Skin:     General: Skin is warm and dry.   Neurological:      General: No focal deficit present.      Mental Status: She is alert and oriented to person, place, and time.   Psychiatric:         Mood and Affect: Mood normal.         Behavior: Behavior normal.         Thought Content: Thought content normal.         Judgment: Judgment normal.         Assessment/Plan   Problem List Items Addressed This Visit        HTN (hypertension)    Relevant Medications    lisinopril 5 mg tablet

## 2024-04-12 NOTE — PATIENT INSTRUCTIONS
I have prescribed Augmentin and Albuterol inhaler for your respiratory infection. I recommend healthy diet, adequate hydration and rest. Keep your follow up appointment on 4/24/24 for annual medicare wellness exam.

## 2024-04-12 NOTE — TELEPHONE ENCOUNTER
Antoine called and states that she had phoned in her refills for her Morphine and her Oxycodone.  Morphine was not filled, she needs her Morphine sent in for a refill.

## 2024-04-15 PROBLEM — J06.9 ACUTE URI OF MULTIPLE SITES: Status: ACTIVE | Noted: 2024-04-15

## 2024-04-15 ASSESSMENT — ENCOUNTER SYMPTOMS
SHORTNESS OF BREATH: 1
FACIAL SWELLING: 1
SINUS PRESSURE: 1
SINUS PAIN: 1
WHEEZING: 1
RHINORRHEA: 1

## 2024-04-16 DIAGNOSIS — J06.9 ACUTE URI OF MULTIPLE SITES: ICD-10-CM

## 2024-04-19 ENCOUNTER — LAB (OUTPATIENT)
Dept: LAB | Facility: LAB | Age: 74
End: 2024-04-19
Payer: MEDICARE

## 2024-04-22 ENCOUNTER — LAB (OUTPATIENT)
Dept: LAB | Facility: LAB | Age: 74
End: 2024-04-22
Payer: MEDICARE

## 2024-04-22 DIAGNOSIS — R53.1 GENERAL WEAKNESS: ICD-10-CM

## 2024-04-22 DIAGNOSIS — C57.01 CARCINOMA OF RIGHT FALLOPIAN TUBE (MULTI): ICD-10-CM

## 2024-04-22 DIAGNOSIS — E55.9 VITAMIN D DEFICIENCY: ICD-10-CM

## 2024-04-22 DIAGNOSIS — E11.40 CONTROLLED TYPE 2 DIABETES MELLITUS WITH DIABETIC NEUROPATHY, WITHOUT LONG-TERM CURRENT USE OF INSULIN (MULTI): ICD-10-CM

## 2024-04-22 DIAGNOSIS — C56.3: ICD-10-CM

## 2024-04-22 LAB
25(OH)D3 SERPL-MCNC: 72 NG/ML (ref 30–100)
ALBUMIN SERPL BCP-MCNC: 4 G/DL (ref 3.4–5)
ALP SERPL-CCNC: 60 U/L (ref 33–136)
ALT SERPL W P-5'-P-CCNC: 14 U/L (ref 7–45)
ANION GAP SERPL CALC-SCNC: 11 MMOL/L (ref 10–20)
APPEARANCE UR: ABNORMAL
AST SERPL W P-5'-P-CCNC: 23 U/L (ref 9–39)
BASOPHILS # BLD AUTO: 0.03 X10*3/UL (ref 0–0.1)
BASOPHILS NFR BLD AUTO: 0.6 %
BILIRUB SERPL-MCNC: 0.5 MG/DL (ref 0–1.2)
BILIRUB UR STRIP.AUTO-MCNC: NEGATIVE MG/DL
BUN SERPL-MCNC: 15 MG/DL (ref 6–23)
CALCIUM SERPL-MCNC: 9.3 MG/DL (ref 8.6–10.3)
CHLORIDE SERPL-SCNC: 101 MMOL/L (ref 98–107)
CHOLEST SERPL-MCNC: 106 MG/DL (ref 0–199)
CHOLESTEROL/HDL RATIO: 2.8
CO2 SERPL-SCNC: 31 MMOL/L (ref 21–32)
COLOR UR: YELLOW
CREAT SERPL-MCNC: 1.01 MG/DL (ref 0.5–1.05)
CREAT UR-MCNC: 130.9 MG/DL (ref 20–320)
EGFRCR SERPLBLD CKD-EPI 2021: 59 ML/MIN/1.73M*2
EOSINOPHIL # BLD AUTO: 0.1 X10*3/UL (ref 0–0.4)
EOSINOPHIL NFR BLD AUTO: 2 %
ERYTHROCYTE [DISTWIDTH] IN BLOOD BY AUTOMATED COUNT: 15.8 % (ref 11.5–14.5)
EST. AVERAGE GLUCOSE BLD GHB EST-MCNC: 212 MG/DL
GLUCOSE SERPL-MCNC: 177 MG/DL (ref 74–99)
GLUCOSE UR STRIP.AUTO-MCNC: NEGATIVE MG/DL
HBA1C MFR BLD: 9 %
HCT VFR BLD AUTO: 35.1 % (ref 36–46)
HDLC SERPL-MCNC: 38.5 MG/DL
HGB BLD-MCNC: 10.9 G/DL (ref 12–16)
IMM GRANULOCYTES # BLD AUTO: 0.02 X10*3/UL (ref 0–0.5)
IMM GRANULOCYTES NFR BLD AUTO: 0.4 % (ref 0–0.9)
KETONES UR STRIP.AUTO-MCNC: NEGATIVE MG/DL
LDLC SERPL CALC-MCNC: 44 MG/DL
LEUKOCYTE ESTERASE UR QL STRIP.AUTO: NEGATIVE
LYMPHOCYTES # BLD AUTO: 1.79 X10*3/UL (ref 0.8–3)
LYMPHOCYTES NFR BLD AUTO: 35.9 %
MCH RBC QN AUTO: 26 PG (ref 26–34)
MCHC RBC AUTO-ENTMCNC: 31.1 G/DL (ref 32–36)
MCV RBC AUTO: 84 FL (ref 80–100)
MICROALBUMIN UR-MCNC: <7 MG/L
MICROALBUMIN/CREAT UR: NORMAL MG/G{CREAT}
MONOCYTES # BLD AUTO: 0.34 X10*3/UL (ref 0.05–0.8)
MONOCYTES NFR BLD AUTO: 6.8 %
NEUTROPHILS # BLD AUTO: 2.7 X10*3/UL (ref 1.6–5.5)
NEUTROPHILS NFR BLD AUTO: 54.3 %
NITRITE UR QL STRIP.AUTO: NEGATIVE
NON HDL CHOLESTEROL: 68 MG/DL (ref 0–149)
NRBC BLD-RTO: 0 /100 WBCS (ref 0–0)
PH UR STRIP.AUTO: 5 [PH]
PLATELET # BLD AUTO: 151 X10*3/UL (ref 150–450)
POTASSIUM SERPL-SCNC: 4.3 MMOL/L (ref 3.5–5.3)
PROT SERPL-MCNC: 6.7 G/DL (ref 6.4–8.2)
PROT UR STRIP.AUTO-MCNC: NEGATIVE MG/DL
RBC # BLD AUTO: 4.2 X10*6/UL (ref 4–5.2)
RBC # UR STRIP.AUTO: NEGATIVE /UL
SODIUM SERPL-SCNC: 139 MMOL/L (ref 136–145)
SP GR UR STRIP.AUTO: 1.02
TRIGL SERPL-MCNC: 116 MG/DL (ref 0–149)
TSH SERPL-ACNC: 1.5 MIU/L (ref 0.44–3.98)
UROBILINOGEN UR STRIP.AUTO-MCNC: <2 MG/DL
VIT B12 SERPL-MCNC: 692 PG/ML (ref 211–911)
VLDL: 23 MG/DL (ref 0–40)
WBC # BLD AUTO: 5 X10*3/UL (ref 4.4–11.3)

## 2024-04-22 PROCEDURE — 82306 VITAMIN D 25 HYDROXY: CPT

## 2024-04-22 PROCEDURE — 85025 COMPLETE CBC W/AUTO DIFF WBC: CPT

## 2024-04-22 PROCEDURE — 80061 LIPID PANEL: CPT

## 2024-04-22 PROCEDURE — 84443 ASSAY THYROID STIM HORMONE: CPT

## 2024-04-22 PROCEDURE — 82570 ASSAY OF URINE CREATININE: CPT

## 2024-04-22 PROCEDURE — 36415 COLL VENOUS BLD VENIPUNCTURE: CPT

## 2024-04-22 PROCEDURE — 82043 UR ALBUMIN QUANTITATIVE: CPT

## 2024-04-22 PROCEDURE — 83036 HEMOGLOBIN GLYCOSYLATED A1C: CPT

## 2024-04-22 PROCEDURE — 81003 URINALYSIS AUTO W/O SCOPE: CPT

## 2024-04-22 PROCEDURE — 80053 COMPREHEN METABOLIC PANEL: CPT

## 2024-04-22 PROCEDURE — 82607 VITAMIN B-12: CPT

## 2024-04-24 ENCOUNTER — APPOINTMENT (OUTPATIENT)
Dept: PRIMARY CARE | Facility: CLINIC | Age: 74
End: 2024-04-24
Payer: MEDICARE

## 2024-04-24 DIAGNOSIS — E11.65 UNCONTROLLED TYPE 2 DIABETES MELLITUS WITH HYPERGLYCEMIA (MULTI): Primary | ICD-10-CM

## 2024-04-24 DIAGNOSIS — E11.40 CONTROLLED TYPE 2 DIABETES MELLITUS WITH DIABETIC NEUROPATHY, WITHOUT LONG-TERM CURRENT USE OF INSULIN (MULTI): ICD-10-CM

## 2024-04-24 RX ORDER — GLIMEPIRIDE 4 MG/1
4 TABLET ORAL 2 TIMES DAILY
Qty: 200 TABLET | Refills: 3 | Status: SHIPPED | OUTPATIENT
Start: 2024-04-24 | End: 2025-05-29

## 2024-04-24 RX ORDER — METFORMIN HYDROCHLORIDE 1000 MG/1
1000 TABLET ORAL
Qty: 200 TABLET | Refills: 3 | Status: SHIPPED | OUTPATIENT
Start: 2024-04-24 | End: 2024-05-07 | Stop reason: SINTOL

## 2024-04-25 ENCOUNTER — TELEPHONE (OUTPATIENT)
Dept: PRIMARY CARE | Facility: CLINIC | Age: 74
End: 2024-04-25
Payer: COMMERCIAL

## 2024-04-25 NOTE — TELEPHONE ENCOUNTER
Cristino Cruz, APRN-CNP  [facility] [id number] [facility]  Please, tell patient that her lab results are unremarkable besides her hemoglobin A1c that is elevated at 9% which indicates uncontrolled diabetes.  So, I have double up on her diabetic medications.

## 2024-05-01 ENCOUNTER — OFFICE VISIT (OUTPATIENT)
Dept: HEMATOLOGY/ONCOLOGY | Facility: CLINIC | Age: 74
End: 2024-05-01
Payer: MEDICARE

## 2024-05-01 VITALS
WEIGHT: 236.22 LBS | TEMPERATURE: 98.2 F | OXYGEN SATURATION: 93 % | HEART RATE: 74 BPM | RESPIRATION RATE: 16 BRPM | SYSTOLIC BLOOD PRESSURE: 115 MMHG | HEIGHT: 72 IN | BODY MASS INDEX: 32 KG/M2 | DIASTOLIC BLOOD PRESSURE: 78 MMHG

## 2024-05-01 DIAGNOSIS — C57.01 CARCINOMA OF RIGHT FALLOPIAN TUBE (MULTI): ICD-10-CM

## 2024-05-01 DIAGNOSIS — C56.3: ICD-10-CM

## 2024-05-01 DIAGNOSIS — R53.1 GENERAL WEAKNESS: ICD-10-CM

## 2024-05-01 PROCEDURE — 3048F LDL-C <100 MG/DL: CPT | Performed by: INTERNAL MEDICINE

## 2024-05-01 PROCEDURE — 3062F POS MACROALBUMINURIA REV: CPT | Performed by: INTERNAL MEDICINE

## 2024-05-01 PROCEDURE — 1125F AMNT PAIN NOTED PAIN PRSNT: CPT | Performed by: INTERNAL MEDICINE

## 2024-05-01 PROCEDURE — 1160F RVW MEDS BY RX/DR IN RCRD: CPT | Performed by: INTERNAL MEDICINE

## 2024-05-01 PROCEDURE — 99213 OFFICE O/P EST LOW 20 MIN: CPT | Performed by: INTERNAL MEDICINE

## 2024-05-01 PROCEDURE — 3078F DIAST BP <80 MM HG: CPT | Performed by: INTERNAL MEDICINE

## 2024-05-01 PROCEDURE — 1157F ADVNC CARE PLAN IN RCRD: CPT | Performed by: INTERNAL MEDICINE

## 2024-05-01 PROCEDURE — 3008F BODY MASS INDEX DOCD: CPT | Performed by: INTERNAL MEDICINE

## 2024-05-01 PROCEDURE — 3074F SYST BP LT 130 MM HG: CPT | Performed by: INTERNAL MEDICINE

## 2024-05-01 PROCEDURE — 4010F ACE/ARB THERAPY RXD/TAKEN: CPT | Performed by: INTERNAL MEDICINE

## 2024-05-01 PROCEDURE — 1159F MED LIST DOCD IN RCRD: CPT | Performed by: INTERNAL MEDICINE

## 2024-05-01 PROCEDURE — 3052F HG A1C>EQUAL 8.0%<EQUAL 9.0%: CPT | Performed by: INTERNAL MEDICINE

## 2024-05-01 ASSESSMENT — PAIN SCALES - GENERAL: PAINLEVEL: 8

## 2024-05-01 NOTE — PROGRESS NOTES
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   Presented with right lower quadrant pain in late December that was initially attributed to gallstones.  On further work-up by her surgeon, ultrasound of the pelvis showed bilateral ovarian mass lesions measuring 6.4 cm on the right and 4.3 cm in the left.   CA-125: 24.0.  CT chest showed small lesions in right lung measuring 0.4 cm in RLL and 0.5 cm in RML.  Mammogram was negative.   3/1/2021: BSO, omentectomy and biopsies.  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 pelvic mass.  Left ovary/tube, omentum: negative.  0/8 nodes positive.   Adj. chemo recommended.   4/5/2021: : 15.5  4/12/2021: Cycle #1/6 adjuvant chemotherapy with paclitaxel/carboplatin  5/3/21: #2 Carbo/Taxol  5/24/21: #3 Carbo/taxol  6/21/21: #4/6 carbo/Taxol.  7/13/2021: #5/6 carbo/Taxol.  8/3/2021: #6/6 carbo/Taxol  8/11/2021: CA-125: 8.5.  1/10/2022: : 9.2.   4/28/2022: : 40.9.  5/16/2022: CT abdomen pelvis: Increased adenopathy consistent with progressive disease.  6/22/2022: : 41.0.  7/15/2022: #1 carboplatin/gemcitabine.  8/12/2022: #2 carboplatin/gemcitabine. D8 chemo was held because of pancytopenia  9/1/2022: : 13.9.  9/9/22      C3D1 , no D8  9/30/22    C4, carboplatin and gemcitabine with dose reduction, no day 8 chemo  10/13/22 , CT of A/P ,  Cystic mass noted  along the right posterior liver has increased in size, however all of  the othermesenteric and pelvic metastatic implants appear to be  decreased. No new sites of metastatic disease.  10/21/22   C5D1  11/11/22  C6D1  2/11/23 , CT of CAP , CHEST  1.  Stablechronic changes without evidence of metastatic disease  within the chest.   ABDOMEN - PELVIS  1.  Status post hysterectomy and bilateral salpingo-oophorectomy,  with similar to mildly  decreased appearance of peritoneal and  mesenteric deposits as described above. No visualized new metastatic  deposits. Maximum decrease is seen in the cystic lesion adjacent to  the inferior margin of the liver parenchyma.  2023: : 11.1 (normal).  23 , CT of abdominal pelvis, peritoneal deposits slightly increased in size      2023, right chest wall subcutaneous nodule, needle biopsy negative for malignancy  23 , PET , 1. FDG avid soft tissue implants in the left aspect of the pelvis, similar in size to abdominopelvic CT dated 2023, and remain concerning for metastatic disease. 2. Newly developed FDG avid soft tissue in the right aspect of the  pelvis may reflect a metastatic lymph node versus metastatic implant.     24 , CT of abdominal pelvis, peritoneal metastasis slightly decreased in size and stable as compared to previous PET scan done in 2024        History of Present Illness:      ID Statement:    JERMAINE SOTO is a 73 year old Female        Chief Complaint: Ovarian cancer   Interval History:    24  Jermaine Soto returns today for follow-up and treatment of ovarian/fallopian tube cancer.  She complains of weakness and fatigue, no nausea, something patient lower  abdominal pain, patient also has episode of diarrhea which started after taking metformin    Past medical history: Ovarian/fallopian tube cancer, stage IIA, s/p BSO, omentectomy as described above. Last chemotherapy was given on 2022.  CT scans stable/improved.   levels have been normal. .  History hypertension, diabetes, diabetic neuropathy, low back pain, degenerative joint disease, tonsillectomy, vaginal hysterectomy  for benign disease, Nissen fundoplication,  4 back surgeries, ankle/foot surgeries.  Had biopsy of benign left breast lesion.  No other history of malignancy, MI, CVA or VTE disease.      Family medical history: Mother, father and brother had colon  cancer.  Brother had brain tumor.  No family history of breast, ovarian or other gynecologic  cancer.      Social history: Former smoker, quit 1973.  Remote history alcohol use, quit 1973.     Review of Systems:   Review of Systems:    Constitutional: No fever, chills, night sweats.  Some fatigue.  Head and neck: No headaches or dizziness.  No pain, stiffness.   HEENT: No sore throat, sinusitis.  Hearing is adequate. Vison is adequate.   Cardiac: No chest pain, palpitations, lightheadedness.   Left lateral chest wall pain, intermittent.  Respiratory: No increased dyspnea, cough, hemoptysis.   GI: Appetite is good, weight stable.  Occasional lower abdominal discomfort.  Some constipation, diarrhea, change in bowel movements, following treatment.  No melena, hematochezia, nausea, vomiting.  Genitourinary: No frequency, urgency.  No polyuria, dysuria, hematuria.   Musculoskeletal: Complains of mild arthralgias.    Endocrine: History diabetes, no thyroid disease.  Skin: No rash, skin lesions, itching.  Alopecia resolving.  Neuromuscular: No lightheadedness, dizziness.  No history of seizure.  Had some numbness, paresthesias in her feet, slowly improving.  No focal weakness, tremor.                    Allergies and Intolerances:       Allergies:         Bactrim: Drug, Itching (Severe), Rash (Severe), Active     Outpatient Medication Profile:  * Patient Currently Takes Medications as of 13-Sep-2023 12:39 documented in Structured Notes         atorvastatin 80 mg oral tablet : Last Dose Taken:  , 1 tab(s) orally once (at bedtime), Start Date: 05-Jul-2023         apixaban 5 mg oral tablet: Last Dose Taken:  , 1 tab(s) orally every  12 hours, Start Date: 05-Jul-2023         morphine 30 mg/8 to 12 hr oral tablet, extended release: Last Dose Taken:   , 1 tab(s) orally every 8 hours , Start Date: 24-May-2023         meclizine 25 mg oral tablet: Last Dose Taken:  , 1 tab(s) orally 3 times  a day as needed for dizziness, Start  Date: 16-Jan-2023         glimepiride 2 mg oral tablet: Last Dose Taken:  , 1 tab(s) orally once  a day         Allergy Relief 10 mg oral tablet: Last Dose Taken:  , 1 tab(s) orally  once a day         Zinc: Last Dose Taken:  , 50 mg oral once a day         tiZANidine 4 mg oral tablet: Last Dose Taken:  , 2 tab(s) oral prn         Aspir 81 oral delayed release tablet: Last Dose Taken:  , 1 tab(s) orally  once a day         bisoprolol-hydrochlorothiazide 5 mg-6.25 mg oral tablet: Last Dose Taken:   , 1 tab(s) orally once a day         Vitamin C 100 mg oral tablet: Last Dose Taken:  , 2 tab(s) orally 3 times  a day         Vitamin D3 125 mcg (5000 intl units) oral tablet: Last Dose Taken:  ,  1 tab(s) orally 2 times a day         magnesium carbonate 250 mg oral capsule: Last Dose Taken:           lisinopril 10 mg oral tablet: Last Dose Taken:  , 1 cap(s) oral once  a day         omeprazole 40 mg oral delayed release capsule: Last Dose Taken:  , 1  cap(s) oral once a day         senna 8.6 mg oral tablet: Last Dose Taken:  , 1 cap(s) oral once a day         gabapentin 300 mg oral capsule: Last Dose Taken:  , 1 cap(s) oral 3 times  a day         albuterol 2.5 mg/3 mL (0.083%) inhalation solution: Last Dose Taken:   , 1 INHL inhalation once a day             Medical History:         Encounter for antineoplastic chemotherapy: ICD-10: Z51.11,  Status: Active     Family History: No Family History items are recorded  in the problem list.      Social History:   Social Substance History:  ·  Smoking Status former smoker (1)            Vitals and Measurements:   Vitals: Temp: 36.3  HR: 75  RR: 16  BP: 106/72  SPO2%:   94   Measurements: HT(cm): 178.2  WT(kg): 102.3  BSA:  2.25  BMI:  32.2   Last 3 Weights & Heights: Date:                           Weight/Scale Type:                    Height:   13-Sep-2023 12:27                102.3  kg                     178.2  cm  28-Jun-2023 08:59                104.7  kg                      178.2  cm  25-May-2023 13:19                108  kg                     178.2  cm      Physical Exam:      Constitutional: Well developed, awake/alert/oriented  x3.   Eyes: PERRL, EOMI, clear sclera.   ENMT: No external lesions.   Head/Neck: Neck with normal movement.  No mass, adenopathy  or tenderness.  No JVD. Trachea midline.   Respiratory/Thorax: Thorax symmetric. Clear to auscultation.   No wheezes, rales, rhonchi.  Mild tenderness palpation left lower anterolateral chest wall/ribs, but no mass lesion, skin lesions.   Cardiovascular: Regular, rate and rhythm. No murmur.   No rubs or gallops. Normal S1, S2.   Gastrointestinal: Nondistended.  Healed abdominal  incision.  No masses palpable.  No palpable hepatomegaly, splenomegaly.   Lower abdominal pain on palpation   Musculoskeletal: Mild joint deformities.   Extremities: Unremarkable extremities; no cyanosis,   No edema.   Neurological: Alert and oriented x3.   Lymphatic: No palpably significant lymphadenopathy  in the neck, supraclavicular areas.   Psychological: Appropriate mood and behavior.   Skin: Warm and dry, no rashes, no suspicious lesions.         Lab Results:    Ref Range & Units 9 d ago  (4/22/24) 2 mo ago  (2/6/24) 4 mo ago  (12/15/23) 7 mo ago  (9/13/23) 7 mo ago  (9/8/23) 10 mo ago  (7/4/23) 10 mo ago  (7/3/23)   WBC  4.4 - 11.3 x10*3/uL 5.0 4.9 5.2 CANCELED R, CM 5.3 R 5.9 R 5.1 R   nRBC  0.0 - 0.0 /100 WBCs 0.0   CANCELED R, CM      RBC  4.00 - 5.20 x10*6/uL 4.20 4.20 3.96 Low  CANCELED R, CM 3.81 Low  R 3.71 Low  R 3.54 Low  R   Hemoglobin  12.0 - 16.0 g/dL 10.9 Low  10.7 Low  10.5 Low  CANCELED R, CM 10.7 Low  10.6 Low  10.5 Low    Hematocrit  36.0 - 46.0 % 35.1 Low  35.4 Low  33.8 Low  CANCELED R, CM 34.6 Low  33.1 Low  31.9 Low    MCV  80 - 100 fL 84 84 85 CANCELED R, CM 91 89 90   MCH  26.0 - 34.0 pg 26.0 25.5 Low  26.5       MCHC  32.0 - 36.0 g/dL 31.1 Low  30.2 Low  31.1 Low  CANCELED R, CM 30.9 Low  32.0 32.9   RDW  11.5 - 14.5 % 15.8  High  15.4 High  14.5 CANCELED R, CM 14.1 14.3 14.1   Platelets  150 - 450 x10*3/uL 151 154         Result Notes            Component  Ref Range & Units 1 mo ago  (3/15/24) 4 mo ago  (12/15/23) 7 mo ago  (9/13/23) 7 mo ago  (9/8/23) 11 mo ago  (5/19/23) 1 yr ago  (2/10/23) 1 yr ago  (12/7/22)   Cancer   0.0 - 30.2 U/mL 10.4 20.6 CANCELED R, CM 10.8 CM 11.1 CM            Ref Range & Units 2 mo ago  (2/6/24) 4 mo ago  (12/15/23) 7 mo ago  (9/13/23) 7 mo ago  (9/8/23) 10 mo ago  (7/4/23) 10 mo ago  (7/3/23) 10 mo ago  (7/2/23)   Glucose  74 - 99 mg/dL 157 High  161 High  CANCELED R,  High  109 High  103 High  107 High    Sodium  136 - 145 mmol/L 139 135 Low  CANCELED R,  138 139 137   Potassium  3.5 - 5.3 mmol/L 4.1 4.1 CANCELED R, CM 4.2 4.2 3.8 3.9   Chloride  98 - 107 mmol/L 103 100 CANCELED R,  103 105 104   Bicarbonate  21 - 32 mmol/L 29 30 CANCELED R, CM 28 27 28 25   Anion Gap  10 - 20 mmol/L 11 9 Low  CANCELED R, CM 11 12 10 12   Urea Nitrogen  6 - 23 mg/dL 19 20 CANCELED R, CM 18 17 14 16   Creatinine  0.50 - 1.05 mg/dL 1.17 High  0.88 CANCELED R, CM 0.98 0.98 0.95 0.92   eGFR  >60 mL/min/1.73m*2 50 Low  70 CM        Comment: Calculations of estimated GFR are performed using the 2021 CKD-EPI Study Refit equation without the race variable for the IDMS-Traceable creatinine methods.  https://jasn.asnjournals.org/content/early/2021/09/22/ASN.5004626021   Calcium  8.6 - 10.3 mg/dL 9.4 9.3 CANCELED R, CM 9.3 9.5 9.2 9.1   Albumin  3.4 - 5.0 g/dL 3.9 3.7 CANCELED R, CM 3.8      Alkaline Phosphatase  33 - 136 U/L 56 63 CANCELED R, CM 55      Total Protein  6.4 - 8.2 g/dL 7.1 6.5 CANCELED R, CM 6.8      AST  9 - 39 U/L 16 14 CANCELED R, CM 15      Bilirubin, Total  0.0 - 1.2 mg/dL 0.5 0.4 CANCELED R, CM 0.4      ALT  7 - 45 U/L 11           ·  Results                      Assessment and Plan:      Assessment and Plan:   Assessment:    1.  High-grade serous carcinoma right ovary/fallopian tube/,  status postpelvic mass BSO, omentectomy, pelvic washings, FIGO stage IIa (pT2a pN0 cM0).  Status post  6 cycles adjuvant chemotherapy with paclitaxel/carboplatin.  Had significant musculoskeletal pain with Taxol infusions.  Ca-125 level elevated at 40.9 (previously normal).  CT shows evidence of progression.  Symptomatic. s/p 4 cycles carboplatin/gemcitabine  October 13, 2022 CAT scan abdominal pelvis did show omental mass with decrease in size, patient is receiving only grade 1 chemotherapy including carboplatin and gemcitabine because of pancytopenia  11/11/22: Last dose of carboplatin and gemcitabine  2/11/23: CT of CAP: Chest- stable disease, no evidence of progression of disease, peritoneal nodule measuring 3 cm which is stable.  5/19/2023: : 11.1.  June 6, 2023, CT of  abdominal pelvis, peritoneal deposit slightly increased in size     9/09/23 , PET , 1. FDG avid soft tissue implants in the left aspect of the pelvis, similar in size to abdominopelvic CT dated 05/30/2023, and remain concerning for metastatic disease. 2. Newly developed FDG avid soft tissue in the right aspect of the  pelvis may reflect a metastatic lymph node versus metastatic implant.   1/4/24 , CT of A/P , stable     2.  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      3.  Multiple medical problems.      4.  Abdominal pain, likely secondary to malignancy versus benign disease.  Controlled with MS Contin/oxycodone.     5.  History small right lung nodules of uncertain significance.     6.  Left lateral chest wall pain, intermittent, likely benign, rule out malignancy..     Plan:   No change in clinically status, patient still has lower abdominal pain and diarrhea.   has been stable.  I will schedule follow-up PET scan, monitor , reevaluation after treatment    Greater than 30 minutes time spent with patient reviewing diagnostic studies, discussing diagnosis and treatment plan and documenting in EMR.

## 2024-05-01 NOTE — PATIENT INSTRUCTIONS
PET scan and blood work to be completed in 2 months.     Follow up with Dr. Weiss after PET scan to discuss results.

## 2024-05-02 ENCOUNTER — APPOINTMENT (OUTPATIENT)
Dept: HEMATOLOGY/ONCOLOGY | Facility: CLINIC | Age: 74
End: 2024-05-02
Payer: MEDICARE

## 2024-05-06 ENCOUNTER — TELEPHONE (OUTPATIENT)
Dept: HEMATOLOGY/ONCOLOGY | Facility: CLINIC | Age: 74
End: 2024-05-06
Payer: COMMERCIAL

## 2024-05-06 DIAGNOSIS — G89.4 CHRONIC PAIN SYNDROME: ICD-10-CM

## 2024-05-06 DIAGNOSIS — C57.01 CARCINOMA OF RIGHT FALLOPIAN TUBE (MULTI): ICD-10-CM

## 2024-05-06 RX ORDER — OXYCODONE HYDROCHLORIDE 10 MG/1
10 TABLET ORAL EVERY 6 HOURS PRN
Qty: 120 TABLET | Refills: 0 | Status: SHIPPED | OUTPATIENT
Start: 2024-05-06

## 2024-05-06 RX ORDER — MORPHINE SULFATE 60 MG/1
60 TABLET, FILM COATED, EXTENDED RELEASE ORAL 2 TIMES DAILY
Qty: 60 TABLET | Refills: 0 | Status: SHIPPED | OUTPATIENT
Start: 2024-05-06 | End: 2024-06-07

## 2024-05-06 NOTE — TELEPHONE ENCOUNTER
[facility] report obtained. Verified last refill date and quantity. Information forward to provider.   Morphine last filled 4/12/2024 for 30 days and oxycodone last filled 4/9/2024 for 30 days.

## 2024-05-07 DIAGNOSIS — E78.5 HYPERLIPIDEMIA ASSOCIATED WITH TYPE 2 DIABETES MELLITUS (MULTI): ICD-10-CM

## 2024-05-07 DIAGNOSIS — N18.31 STAGE 3A CHRONIC KIDNEY DISEASE (MULTI): Primary | ICD-10-CM

## 2024-05-07 DIAGNOSIS — E11.69 HYPERLIPIDEMIA ASSOCIATED WITH TYPE 2 DIABETES MELLITUS (MULTI): ICD-10-CM

## 2024-05-07 RX ORDER — DAPAGLIFLOZIN 10 MG/1
10 TABLET, FILM COATED ORAL DAILY
Qty: 100 TABLET | Refills: 3 | Status: SHIPPED | OUTPATIENT
Start: 2024-05-07 | End: 2025-06-11

## 2024-05-13 ENCOUNTER — APPOINTMENT (OUTPATIENT)
Dept: CARDIOLOGY | Facility: HOSPITAL | Age: 74
DRG: 871 | End: 2024-05-13
Payer: MEDICARE

## 2024-05-13 ENCOUNTER — APPOINTMENT (OUTPATIENT)
Dept: RADIOLOGY | Facility: HOSPITAL | Age: 74
DRG: 871 | End: 2024-05-13
Payer: MEDICARE

## 2024-05-13 ENCOUNTER — HOSPITAL ENCOUNTER (INPATIENT)
Facility: HOSPITAL | Age: 74
LOS: 4 days | Discharge: HOME | DRG: 871 | End: 2024-05-17
Attending: STUDENT IN AN ORGANIZED HEALTH CARE EDUCATION/TRAINING PROGRAM | Admitting: INTERNAL MEDICINE
Payer: MEDICARE

## 2024-05-13 DIAGNOSIS — N30.00 ACUTE CYSTITIS WITHOUT HEMATURIA: ICD-10-CM

## 2024-05-13 DIAGNOSIS — R41.82 ALTERED MENTAL STATUS, UNSPECIFIED ALTERED MENTAL STATUS TYPE: Primary | ICD-10-CM

## 2024-05-13 DIAGNOSIS — R26.2 AMBULATORY DYSFUNCTION: ICD-10-CM

## 2024-05-13 DIAGNOSIS — R19.5 LOOSE STOOLS: ICD-10-CM

## 2024-05-13 DIAGNOSIS — M62.3 IMMOBILITY SYNDROME: ICD-10-CM

## 2024-05-13 DIAGNOSIS — N39.0 URINARY TRACT INFECTION WITHOUT HEMATURIA, SITE UNSPECIFIED: ICD-10-CM

## 2024-05-13 LAB
ALBUMIN SERPL BCP-MCNC: 3.8 G/DL (ref 3.4–5)
ALP SERPL-CCNC: 45 U/L (ref 33–136)
ALT SERPL W P-5'-P-CCNC: 10 U/L (ref 7–45)
ANION GAP BLDV CALCULATED.4IONS-SCNC: 8 MMOL/L (ref 10–25)
ANION GAP SERPL CALC-SCNC: 13 MMOL/L (ref 10–20)
APPEARANCE UR: ABNORMAL
AST SERPL W P-5'-P-CCNC: 19 U/L (ref 9–39)
BACTERIA #/AREA URNS AUTO: ABNORMAL /HPF
BASE EXCESS BLDV CALC-SCNC: 3.2 MMOL/L (ref -2–3)
BASOPHILS # BLD AUTO: 0.03 X10*3/UL (ref 0–0.1)
BASOPHILS NFR BLD AUTO: 0.3 %
BILIRUB SERPL-MCNC: 0.5 MG/DL (ref 0–1.2)
BILIRUB UR STRIP.AUTO-MCNC: NEGATIVE MG/DL
BNP SERPL-MCNC: 41 PG/ML (ref 0–99)
BODY TEMPERATURE: 37 DEGREES CELSIUS
BUN SERPL-MCNC: 31 MG/DL (ref 6–23)
CA-I BLDV-SCNC: 1.21 MMOL/L (ref 1.1–1.33)
CALCIUM SERPL-MCNC: 9.2 MG/DL (ref 8.6–10.3)
CARDIAC TROPONIN I PNL SERPL HS: 7 NG/L (ref 0–13)
CHLORIDE BLDV-SCNC: 102 MMOL/L (ref 98–107)
CHLORIDE SERPL-SCNC: 102 MMOL/L (ref 98–107)
CO2 SERPL-SCNC: 24 MMOL/L (ref 21–32)
COLOR UR: YELLOW
CREAT SERPL-MCNC: 1.23 MG/DL (ref 0.5–1.05)
EGFRCR SERPLBLD CKD-EPI 2021: 47 ML/MIN/1.73M*2
EOSINOPHIL # BLD AUTO: 0.04 X10*3/UL (ref 0–0.4)
EOSINOPHIL NFR BLD AUTO: 0.4 %
ERYTHROCYTE [DISTWIDTH] IN BLOOD BY AUTOMATED COUNT: 16.5 % (ref 11.5–14.5)
FLUAV RNA RESP QL NAA+PROBE: NOT DETECTED
FLUBV RNA RESP QL NAA+PROBE: NOT DETECTED
GLUCOSE BLD MANUAL STRIP-MCNC: 101 MG/DL (ref 74–99)
GLUCOSE BLDV-MCNC: 92 MG/DL (ref 74–99)
GLUCOSE SERPL-MCNC: 87 MG/DL (ref 74–99)
GLUCOSE UR STRIP.AUTO-MCNC: ABNORMAL MG/DL
HCO3 BLDV-SCNC: 27.9 MMOL/L (ref 22–26)
HCT VFR BLD AUTO: 31.2 % (ref 36–46)
HCT VFR BLD EST: 30 % (ref 36–46)
HGB BLD-MCNC: 9.9 G/DL (ref 12–16)
HGB BLDV-MCNC: 10 G/DL (ref 12–16)
IMM GRANULOCYTES # BLD AUTO: 0.03 X10*3/UL (ref 0–0.5)
IMM GRANULOCYTES NFR BLD AUTO: 0.3 % (ref 0–0.9)
INHALED O2 CONCENTRATION: 21 %
KETONES UR STRIP.AUTO-MCNC: NEGATIVE MG/DL
LACTATE BLDV-SCNC: 1.9 MMOL/L (ref 0.4–2)
LACTATE SERPL-SCNC: 1.3 MMOL/L (ref 0.4–2)
LEUKOCYTE ESTERASE UR QL STRIP.AUTO: ABNORMAL
LYMPHOCYTES # BLD AUTO: 0.74 X10*3/UL (ref 0.8–3)
LYMPHOCYTES NFR BLD AUTO: 8.1 %
MAGNESIUM SERPL-MCNC: 1.53 MG/DL (ref 1.6–2.4)
MCH RBC QN AUTO: 25.9 PG (ref 26–34)
MCHC RBC AUTO-ENTMCNC: 31.7 G/DL (ref 32–36)
MCV RBC AUTO: 82 FL (ref 80–100)
MONOCYTES # BLD AUTO: 0.42 X10*3/UL (ref 0.05–0.8)
MONOCYTES NFR BLD AUTO: 4.6 %
MUCOUS THREADS #/AREA URNS AUTO: ABNORMAL /LPF
NEUTROPHILS # BLD AUTO: 7.86 X10*3/UL (ref 1.6–5.5)
NEUTROPHILS NFR BLD AUTO: 86.3 %
NITRITE UR QL STRIP.AUTO: POSITIVE
NRBC BLD-RTO: 0 /100 WBCS (ref 0–0)
OXYHGB MFR BLDV: 51.3 % (ref 45–75)
PCO2 BLDV: 42 MM HG (ref 41–51)
PH BLDV: 7.43 PH (ref 7.33–7.43)
PH UR STRIP.AUTO: 5 [PH]
PLATELET # BLD AUTO: 151 X10*3/UL (ref 150–450)
PO2 BLDV: 35 MM HG (ref 35–45)
POTASSIUM BLDV-SCNC: 4.3 MMOL/L (ref 3.5–5.3)
POTASSIUM SERPL-SCNC: 4.2 MMOL/L (ref 3.5–5.3)
PROT SERPL-MCNC: 6.8 G/DL (ref 6.4–8.2)
PROT UR STRIP.AUTO-MCNC: NEGATIVE MG/DL
RBC # BLD AUTO: 3.82 X10*6/UL (ref 4–5.2)
RBC # UR STRIP.AUTO: ABNORMAL /UL
RBC #/AREA URNS AUTO: ABNORMAL /HPF
SAO2 % BLDV: 52 % (ref 45–75)
SARS-COV-2 RNA RESP QL NAA+PROBE: NOT DETECTED
SODIUM BLDV-SCNC: 134 MMOL/L (ref 136–145)
SODIUM SERPL-SCNC: 135 MMOL/L (ref 136–145)
SP GR UR STRIP.AUTO: 1.01
UROBILINOGEN UR STRIP.AUTO-MCNC: <2 MG/DL
WBC # BLD AUTO: 9.1 X10*3/UL (ref 4.4–11.3)
WBC #/AREA URNS AUTO: ABNORMAL /HPF
WBC CLUMPS #/AREA URNS AUTO: ABNORMAL /HPF

## 2024-05-13 PROCEDURE — 71046 X-RAY EXAM CHEST 2 VIEWS: CPT | Performed by: RADIOLOGY

## 2024-05-13 PROCEDURE — 2500000004 HC RX 250 GENERAL PHARMACY W/ HCPCS (ALT 636 FOR OP/ED): Performed by: INTERNAL MEDICINE

## 2024-05-13 PROCEDURE — 84132 ASSAY OF SERUM POTASSIUM: CPT | Mod: 91 | Performed by: STUDENT IN AN ORGANIZED HEALTH CARE EDUCATION/TRAINING PROGRAM

## 2024-05-13 PROCEDURE — 87040 BLOOD CULTURE FOR BACTERIA: CPT | Mod: 91,PORLAB | Performed by: STUDENT IN AN ORGANIZED HEALTH CARE EDUCATION/TRAINING PROGRAM

## 2024-05-13 PROCEDURE — 1100000001 HC PRIVATE ROOM DAILY

## 2024-05-13 PROCEDURE — 71046 X-RAY EXAM CHEST 2 VIEWS: CPT

## 2024-05-13 PROCEDURE — 82947 ASSAY GLUCOSE BLOOD QUANT: CPT | Mod: 91

## 2024-05-13 PROCEDURE — 83605 ASSAY OF LACTIC ACID: CPT | Performed by: STUDENT IN AN ORGANIZED HEALTH CARE EDUCATION/TRAINING PROGRAM

## 2024-05-13 PROCEDURE — 96365 THER/PROPH/DIAG IV INF INIT: CPT

## 2024-05-13 PROCEDURE — 2500000001 HC RX 250 WO HCPCS SELF ADMINISTERED DRUGS (ALT 637 FOR MEDICARE OP): Performed by: INTERNAL MEDICINE

## 2024-05-13 PROCEDURE — 81001 URINALYSIS AUTO W/SCOPE: CPT | Performed by: STUDENT IN AN ORGANIZED HEALTH CARE EDUCATION/TRAINING PROGRAM

## 2024-05-13 PROCEDURE — 99285 EMERGENCY DEPT VISIT HI MDM: CPT | Mod: 25

## 2024-05-13 PROCEDURE — 87636 SARSCOV2 & INF A&B AMP PRB: CPT | Performed by: STUDENT IN AN ORGANIZED HEALTH CARE EDUCATION/TRAINING PROGRAM

## 2024-05-13 PROCEDURE — 87086 URINE CULTURE/COLONY COUNT: CPT | Mod: PORLAB | Performed by: STUDENT IN AN ORGANIZED HEALTH CARE EDUCATION/TRAINING PROGRAM

## 2024-05-13 PROCEDURE — 82947 ASSAY GLUCOSE BLOOD QUANT: CPT

## 2024-05-13 PROCEDURE — 93005 ELECTROCARDIOGRAM TRACING: CPT

## 2024-05-13 PROCEDURE — 2500000004 HC RX 250 GENERAL PHARMACY W/ HCPCS (ALT 636 FOR OP/ED): Performed by: STUDENT IN AN ORGANIZED HEALTH CARE EDUCATION/TRAINING PROGRAM

## 2024-05-13 PROCEDURE — 36415 COLL VENOUS BLD VENIPUNCTURE: CPT | Performed by: STUDENT IN AN ORGANIZED HEALTH CARE EDUCATION/TRAINING PROGRAM

## 2024-05-13 PROCEDURE — 84484 ASSAY OF TROPONIN QUANT: CPT | Performed by: STUDENT IN AN ORGANIZED HEALTH CARE EDUCATION/TRAINING PROGRAM

## 2024-05-13 PROCEDURE — 96360 HYDRATION IV INFUSION INIT: CPT | Mod: 59

## 2024-05-13 PROCEDURE — 83735 ASSAY OF MAGNESIUM: CPT | Performed by: STUDENT IN AN ORGANIZED HEALTH CARE EDUCATION/TRAINING PROGRAM

## 2024-05-13 PROCEDURE — 83880 ASSAY OF NATRIURETIC PEPTIDE: CPT | Performed by: STUDENT IN AN ORGANIZED HEALTH CARE EDUCATION/TRAINING PROGRAM

## 2024-05-13 PROCEDURE — 99222 1ST HOSP IP/OBS MODERATE 55: CPT | Performed by: INTERNAL MEDICINE

## 2024-05-13 PROCEDURE — 85025 COMPLETE CBC W/AUTO DIFF WBC: CPT | Performed by: STUDENT IN AN ORGANIZED HEALTH CARE EDUCATION/TRAINING PROGRAM

## 2024-05-13 PROCEDURE — 1210000001 HC SEMI-PRIVATE ROOM DAILY

## 2024-05-13 RX ORDER — SENNOSIDES 8.6 MG/1
1 TABLET ORAL NIGHTLY
Status: DISCONTINUED | OUTPATIENT
Start: 2024-05-13 | End: 2024-05-16

## 2024-05-13 RX ORDER — OXYCODONE HYDROCHLORIDE 10 MG/1
10 TABLET ORAL EVERY 6 HOURS PRN
Status: DISCONTINUED | OUTPATIENT
Start: 2024-05-13 | End: 2024-05-17 | Stop reason: HOSPADM

## 2024-05-13 RX ORDER — ACETAMINOPHEN 325 MG/1
650 TABLET ORAL EVERY 4 HOURS PRN
Status: DISCONTINUED | OUTPATIENT
Start: 2024-05-13 | End: 2024-05-17 | Stop reason: HOSPADM

## 2024-05-13 RX ORDER — SODIUM CHLORIDE 9 MG/ML
75 INJECTION, SOLUTION INTRAVENOUS CONTINUOUS
Status: ACTIVE | OUTPATIENT
Start: 2024-05-13 | End: 2024-05-14

## 2024-05-13 RX ORDER — ALBUTEROL SULFATE 90 UG/1
2 AEROSOL, METERED RESPIRATORY (INHALATION) EVERY 4 HOURS PRN
Status: DISCONTINUED | OUTPATIENT
Start: 2024-05-13 | End: 2024-05-17 | Stop reason: HOSPADM

## 2024-05-13 RX ORDER — GABAPENTIN 600 MG/1
600 TABLET ORAL NIGHTLY
Status: DISCONTINUED | OUTPATIENT
Start: 2024-05-13 | End: 2024-05-17 | Stop reason: HOSPADM

## 2024-05-13 RX ORDER — PANTOPRAZOLE SODIUM 40 MG/1
40 TABLET, DELAYED RELEASE ORAL
Status: DISCONTINUED | OUTPATIENT
Start: 2024-05-14 | End: 2024-05-16

## 2024-05-13 RX ORDER — MORPHINE SULFATE 60 MG/1
60 TABLET, FILM COATED, EXTENDED RELEASE ORAL 2 TIMES DAILY
Status: DISCONTINUED | OUTPATIENT
Start: 2024-05-13 | End: 2024-05-17 | Stop reason: HOSPADM

## 2024-05-13 RX ORDER — GABAPENTIN 300 MG/1
300 CAPSULE ORAL 2 TIMES DAILY
Status: DISCONTINUED | OUTPATIENT
Start: 2024-05-13 | End: 2024-05-17 | Stop reason: HOSPADM

## 2024-05-13 RX ORDER — CEFTRIAXONE 1 G/50ML
1 INJECTION, SOLUTION INTRAVENOUS EVERY 24 HOURS
Status: DISCONTINUED | OUTPATIENT
Start: 2024-05-13 | End: 2024-05-16

## 2024-05-13 RX ORDER — ATORVASTATIN CALCIUM 40 MG/1
80 TABLET, FILM COATED ORAL NIGHTLY
Status: DISCONTINUED | OUTPATIENT
Start: 2024-05-13 | End: 2024-05-17 | Stop reason: HOSPADM

## 2024-05-13 RX ORDER — ACETAMINOPHEN 325 MG/1
975 TABLET ORAL ONCE
Status: COMPLETED | OUTPATIENT
Start: 2024-05-13 | End: 2024-05-13

## 2024-05-13 RX ORDER — DAPAGLIFLOZIN 5 MG/1
10 TABLET, FILM COATED ORAL DAILY
Status: DISCONTINUED | OUTPATIENT
Start: 2024-05-14 | End: 2024-05-17 | Stop reason: HOSPADM

## 2024-05-13 RX ORDER — GLIMEPIRIDE 4 MG/1
4 TABLET ORAL 2 TIMES DAILY
Status: DISCONTINUED | OUTPATIENT
Start: 2024-05-13 | End: 2024-05-17 | Stop reason: HOSPADM

## 2024-05-13 RX ORDER — LISINOPRIL 5 MG/1
5 TABLET ORAL DAILY
Status: DISCONTINUED | OUTPATIENT
Start: 2024-05-14 | End: 2024-05-17 | Stop reason: HOSPADM

## 2024-05-13 RX ORDER — FAMOTIDINE 10 MG/ML
20 INJECTION INTRAVENOUS 2 TIMES DAILY
Status: DISCONTINUED | OUTPATIENT
Start: 2024-05-13 | End: 2024-05-17 | Stop reason: HOSPADM

## 2024-05-13 RX ORDER — ASPIRIN 81 MG/1
81 TABLET ORAL DAILY
Status: DISCONTINUED | OUTPATIENT
Start: 2024-05-14 | End: 2024-05-17 | Stop reason: HOSPADM

## 2024-05-13 RX ORDER — MECLIZINE HYDROCHLORIDE 25 MG/1
25 TABLET ORAL 3 TIMES DAILY PRN
Status: DISCONTINUED | OUTPATIENT
Start: 2024-05-13 | End: 2024-05-17 | Stop reason: HOSPADM

## 2024-05-13 RX ADMIN — SODIUM CHLORIDE 75 ML/HR: 9 INJECTION, SOLUTION INTRAVENOUS at 23:54

## 2024-05-13 RX ADMIN — ACETAMINOPHEN 975 MG: 325 TABLET ORAL at 18:54

## 2024-05-13 RX ADMIN — ATORVASTATIN CALCIUM 80 MG: 40 TABLET, FILM COATED ORAL at 23:54

## 2024-05-13 RX ADMIN — GLIMEPIRIDE 4 MG: 4 TABLET ORAL at 23:54

## 2024-05-13 RX ADMIN — GABAPENTIN 600 MG: 300 CAPSULE ORAL at 23:55

## 2024-05-13 RX ADMIN — FAMOTIDINE 20 MG: 10 INJECTION, SOLUTION INTRAVENOUS at 23:45

## 2024-05-13 RX ADMIN — SENNOSIDES 8.6 MG: 8.6 TABLET, FILM COATED ORAL at 23:55

## 2024-05-13 RX ADMIN — MORPHINE SULFATE 60 MG: 60 TABLET, EXTENDED RELEASE ORAL at 23:55

## 2024-05-13 RX ADMIN — APIXABAN 5 MG: 5 TABLET, FILM COATED ORAL at 23:55

## 2024-05-13 RX ADMIN — CEFTRIAXONE SODIUM 1 G: 1 INJECTION, SOLUTION INTRAVENOUS at 23:54

## 2024-05-13 RX ADMIN — SODIUM CHLORIDE, POTASSIUM CHLORIDE, SODIUM LACTATE AND CALCIUM CHLORIDE 1000 ML: 600; 310; 30; 20 INJECTION, SOLUTION INTRAVENOUS at 18:54

## 2024-05-13 RX ADMIN — PIPERACILLIN SODIUM AND TAZOBACTAM SODIUM 4.5 G: 4; .5 INJECTION, SOLUTION INTRAVENOUS at 18:54

## 2024-05-13 SDOH — ECONOMIC STABILITY: TRANSPORTATION INSECURITY: IN THE PAST 12 MONTHS, HAS LACK OF TRANSPORTATION KEPT YOU FROM MEDICAL APPOINTMENTS OR FROM GETTING MEDICATIONS?: NO

## 2024-05-13 SDOH — ECONOMIC STABILITY: FOOD INSECURITY: WITHIN THE PAST 12 MONTHS, THE FOOD YOU BOUGHT JUST DIDN'T LAST AND YOU DIDN'T HAVE MONEY TO GET MORE.: NEVER TRUE

## 2024-05-13 SDOH — ECONOMIC STABILITY: TRANSPORTATION INSECURITY

## 2024-05-13 SDOH — ECONOMIC STABILITY: FOOD INSECURITY: WITHIN THE PAST 12 MONTHS, THE FOOD YOU BOUGHT JUST DIDN’T LAST AND YOU DIDN’T HAVE MONEY TO GET MORE.: NEVER TRUE

## 2024-05-13 SDOH — SOCIAL STABILITY: SOCIAL INSECURITY: ABUSE: ADULT

## 2024-05-13 SDOH — ECONOMIC STABILITY: GENERAL

## 2024-05-13 SDOH — SOCIAL STABILITY: SOCIAL INSECURITY: HAVE YOU HAD THOUGHTS OF HARMING ANYONE ELSE?: NO

## 2024-05-13 SDOH — ECONOMIC STABILITY: FOOD INSECURITY: WITHIN THE PAST 12 MONTHS, YOU WORRIED THAT YOUR FOOD WOULD RUN OUT BEFORE YOU GOT MONEY TO BUY MORE.: NEVER TRUE

## 2024-05-13 SDOH — ECONOMIC STABILITY: HOUSING INSECURITY
IN THE LAST 12 MONTHS, WAS THERE A TIME WHEN YOU DID NOT HAVE A STEADY PLACE TO SLEEP OR SLEPT IN A SHELTER (INCLUDING NOW)?: NO

## 2024-05-13 SDOH — ECONOMIC STABILITY: FOOD INSECURITY

## 2024-05-13 SDOH — ECONOMIC STABILITY: INCOME INSECURITY: IN THE LAST 12 MONTHS, WAS THERE A TIME WHEN YOU WERE NOT ABLE TO PAY THE MORTGAGE OR RENT ON TIME?: NO

## 2024-05-13 SDOH — ECONOMIC STABILITY: HOUSING INSECURITY: IN THE LAST 12 MONTHS, HOW MANY PLACES HAVE YOU LIVED?: 1

## 2024-05-13 SDOH — SOCIAL STABILITY: SOCIAL INSECURITY: WERE YOU ABLE TO COMPLETE ALL THE BEHAVIORAL HEALTH SCREENINGS?: YES

## 2024-05-13 SDOH — ECONOMIC STABILITY: HOUSING INSECURITY: IN THE PAST 12 MONTHS HAS THE ELECTRIC, GAS, OIL, OR WATER COMPANY THREATENED TO SHUT OFF SERVICES IN YOUR HOME?: NO

## 2024-05-13 SDOH — ECONOMIC STABILITY: TRANSPORTATION INSECURITY
IN THE PAST 12 MONTHS, HAS LACK OF TRANSPORTATION KEPT YOU FROM MEETINGS, WORK, OR FROM GETTING THINGS NEEDED FOR DAILY LIVING?: NO

## 2024-05-13 SDOH — ECONOMIC STABILITY: TRANSPORTATION INSECURITY
IN THE PAST 12 MONTHS, HAS THE LACK OF TRANSPORTATION KEPT YOU FROM MEDICAL APPOINTMENTS OR FROM GETTING MEDICATIONS?: NO

## 2024-05-13 SDOH — ECONOMIC STABILITY: FOOD INSECURITY: WITHIN THE PAST 12 MONTHS, YOU WORRIED THAT YOUR FOOD WOULD RUN OUT BEFORE YOU GOT THE MONEY TO BUY MORE.: NEVER TRUE

## 2024-05-13 SDOH — ECONOMIC STABILITY: HOUSING INSECURITY

## 2024-05-13 SDOH — ECONOMIC STABILITY: HOUSING INSECURITY: IN THE LAST 12 MONTHS, WAS THERE A TIME WHEN YOU WERE NOT ABLE TO PAY THE MORTGAGE OR RENT ON TIME?: NO

## 2024-05-13 ASSESSMENT — ACTIVITIES OF DAILY LIVING (ADL)
PATIENT'S MEMORY ADEQUATE TO SAFELY COMPLETE DAILY ACTIVITIES?: YES
DRESSING YOURSELF: INDEPENDENT
ASSISTIVE_DEVICE: WALKER
JUDGMENT_ADEQUATE_SAFELY_COMPLETE_DAILY_ACTIVITIES: YES
LACK_OF_TRANSPORTATION: NO
GROOMING: INDEPENDENT
ADEQUATE_TO_COMPLETE_ADL: YES
BATHING: INDEPENDENT
HEARING - LEFT EAR: FUNCTIONAL
TOILETING: INDEPENDENT
LACK_OF_TRANSPORTATION: NO
FEEDING YOURSELF: INDEPENDENT
HEARING - RIGHT EAR: FUNCTIONAL
WALKS IN HOME: INDEPENDENT

## 2024-05-13 ASSESSMENT — LIFESTYLE VARIABLES
SKIP TO QUESTIONS 9-10: 1
AUDIT-C TOTAL SCORE: 0
HAVE YOU EVER FELT YOU SHOULD CUT DOWN ON YOUR DRINKING: NO
HOW OFTEN DO YOU HAVE 6 OR MORE DRINKS ON ONE OCCASION: NEVER
AUDIT-C TOTAL SCORE: 0
HOW MANY STANDARD DRINKS CONTAINING ALCOHOL DO YOU HAVE ON A TYPICAL DAY: PATIENT DOES NOT DRINK
EVER FELT BAD OR GUILTY ABOUT YOUR DRINKING: NO
TOTAL SCORE: 0
HAVE PEOPLE ANNOYED YOU BY CRITICIZING YOUR DRINKING: NO
HOW OFTEN DO YOU HAVE A DRINK CONTAINING ALCOHOL: NEVER
EVER HAD A DRINK FIRST THING IN THE MORNING TO STEADY YOUR NERVES TO GET RID OF A HANGOVER: NO

## 2024-05-13 ASSESSMENT — PAIN DESCRIPTION - LOCATION
LOCATION: ABDOMEN
LOCATION: BACK

## 2024-05-13 ASSESSMENT — COGNITIVE AND FUNCTIONAL STATUS - GENERAL
PATIENT BASELINE BEDBOUND: NO
MOBILITY SCORE: 24
DAILY ACTIVITIY SCORE: 24
MOBILITY SCORE: 24
DAILY ACTIVITIY SCORE: 24

## 2024-05-13 ASSESSMENT — PATIENT HEALTH QUESTIONNAIRE - PHQ9
SUM OF ALL RESPONSES TO PHQ9 QUESTIONS 1 & 2: 0
2. FEELING DOWN, DEPRESSED OR HOPELESS: NOT AT ALL
1. LITTLE INTEREST OR PLEASURE IN DOING THINGS: NOT AT ALL

## 2024-05-13 ASSESSMENT — PAIN - FUNCTIONAL ASSESSMENT
PAIN_FUNCTIONAL_ASSESSMENT: 0-10

## 2024-05-13 ASSESSMENT — PAIN SCALES - GENERAL
PAINLEVEL_OUTOF10: 6
PAINLEVEL_OUTOF10: 6
PAINLEVEL_OUTOF10: 9
PAINLEVEL_OUTOF10: 6

## 2024-05-13 ASSESSMENT — SOCIAL DETERMINANTS OF HEALTH (SDOH): IN THE PAST 12 MONTHS, HAS THE ELECTRIC, GAS, OIL, OR WATER COMPANY THREATENED TO SHUT OFF SERVICE IN YOUR HOME?: NO

## 2024-05-13 ASSESSMENT — PAIN DESCRIPTION - PAIN TYPE: TYPE: CHRONIC PAIN

## 2024-05-13 NOTE — ED PROVIDER NOTES
"HPI   No chief complaint on file.      This is a 73-year-old female with past medical history of fallopian tube/ovarian carcinoma with recurrence, hypertension, bulimia, type 2 diabetes, history of stroke who presents emerged department for altered medical status.  Daughter states that patient became altered earlier today.  Checked her glucose which was normal and then called ambulance.  She states she is normally alert and oriented x 3 she has not been \"the same\" since her stroke last year, she states that she is now requiring.  Otherwise she denies any nausea, vomiting, abdominal pain, chest pain or shortness of breath.  Did feel cold and was shivering.                          [place] Coma Scale Score: 14                  Patient History   Past Medical History:   Diagnosis Date    Hx antineoplastic chemo     Intra-abdominal and pelvic swelling, mass and lump, unspecified site 01/20/2021    Pelvic mass in female    Other nonspecific abnormal finding of lung field 01/11/2021    Lung infiltrate on CT    Ovarian cancer (Multi)     Personal history of other diseases of the circulatory system 01/20/2021    History of hypertension    Personal history of other diseases of the musculoskeletal system and connective tissue     History of chronic back pain    Personal history of other endocrine, nutritional and metabolic disease 01/20/2021    History of hyperlipidemia    Personal history of other endocrine, nutritional and metabolic disease 01/20/2021    History of diabetes mellitus     Past Surgical History:   Procedure Laterality Date    CT GUIDED PERCUTANEOUS BIOPSY LIVER  06/16/2023    CT GUIDED PERCUTANEOUS BIOPSY LIVER 6/16/2023 POR CT    HYSTERECTOMY      MR HEAD ANGIO WO IV CONTRAST  01/16/2023    MR HEAD ANGIO WO IV CONTRAST 1/16/2023 [facility]    MR NECK ANGIO WO IV CONTRAST  01/16/2023    MR NECK ANGIO WO IV CONTRAST 1/16/2023 [facility]CTOR [facility]    OTHER SURGICAL HISTORY  02/04/2020    Nissen " fundoplication laparoscopic    OTHER SURGICAL HISTORY  02/04/2020    Lower back surgery    OTHER SURGICAL HISTORY  02/04/2020    Hysterectomy    OTHER SURGICAL HISTORY  02/04/2020    Tonsillectomy    OTHER SURGICAL HISTORY  02/04/2020    Foot surgery    OTHER SURGICAL HISTORY  02/04/2020    Lumpectomy     No family history on file.  Social History     Tobacco Use    Smoking status: Never    Smokeless tobacco: Never   Vaping Use    Vaping status: Never Used   Substance Use Topics    Alcohol use: Never    Drug use: Yes     Types: Morphine, Oxycodone       Physical Exam   ED Triage Vitals [05/13/24 1811]   Temperature Heart Rate Respirations BP   (!) 38.8 °C (101.9 °F) 89 (!) 24 136/69      Pulse Ox Temp Source Heart Rate Source Patient Position   94 % Temporal Monitor --      BP Location FiO2 (%)     -- --       Physical Exam  Constitutional:       General: She is not in acute distress.  HENT:      Head: Normocephalic and atraumatic.      Right Ear: Tympanic membrane normal.      Left Ear: Tympanic membrane normal.      Mouth/Throat:      Mouth: Mucous membranes are moist.   Eyes:      Extraocular Movements: Extraocular movements intact.      Conjunctiva/sclera: Conjunctivae normal.      Pupils: Pupils are equal, round, and reactive to light.   Cardiovascular:      Rate and Rhythm: Normal rate and regular rhythm.      Heart sounds: No murmur heard.  Pulmonary:      Effort: Pulmonary effort is normal. No respiratory distress.      Breath sounds: Normal breath sounds. No stridor. No wheezing or rales.   Abdominal:      General: Bowel sounds are normal. There is no distension.      Tenderness: There is no abdominal tenderness. There is no guarding or rebound.   Musculoskeletal:         General: No swelling, tenderness or deformity. Normal range of motion.   Skin:     General: Skin is warm and dry.      Coloration: Skin is not jaundiced.      Findings: No bruising or lesion.   Neurological:      General: No focal deficit  present.      Mental Status: She is alert. She is confused.      Cranial Nerves: No cranial nerve deficit.      Motor: No weakness.   Psychiatric:         Mood and Affect: Mood normal.       Labs Reviewed - No data to display  No orders to display       ED Course & MDM   ED Course as of 05/13/24 2215   Mon May 13, 2024   2044 Nitrite, Urine(!): POSITIVE [CF]   2044 Leukocyte Esterase, Urine(!): MODERATE (2+) [CF]   2044 WBC, Urine(!): 21-50 [CF]   2045 IMPRESSION:  No evidence for acute cardiopulmonary process.       [CF]      ED Course User Index  [CF] Leny Gonzalez MD         Diagnoses as of 05/13/24 2215   Altered mental status, unspecified altered mental status type   Acute cystitis without hematuria       Medical Decision Making  This is a 73-year-old female who is a DNR/DNI presents emergency department for fever of mental status.  On exam she is alert and oriented x 2 but is febrile.  She was given Tylenol along with fluids.Patient's workup shows no leukocytosis or left shift.  Her creatinine is about her baseline.  No obvious electrolyte derangements.  Hemoglobin is around her baseline as well.  COVID and flu are negative.  Chest x-ray shows no signs of pneumonia.  Urine does appear infected.  Patient is now afebrile and does appear more alert.  Daughter states she is alert and oriented x 3 but does not feel comfortable to go home at this time.  Patient reports that she still feels progressively weak.    EKG GA on my read shows sinus rhythm at a rate 86 bpm no ST changes, normal intervals, left anterior fascicular block noted.  Similar to prior EKGs        Procedure  Procedures     Leny Gonzalez MD  05/13/24 2218       Leny Gonzalez MD  06/15/24 1850

## 2024-05-14 LAB
ANION GAP SERPL CALC-SCNC: 11 MMOL/L (ref 10–20)
BUN SERPL-MCNC: 28 MG/DL (ref 6–23)
CALCIUM SERPL-MCNC: 9.1 MG/DL (ref 8.6–10.3)
CHLORIDE SERPL-SCNC: 105 MMOL/L (ref 98–107)
CO2 SERPL-SCNC: 26 MMOL/L (ref 21–32)
CREAT SERPL-MCNC: 1.16 MG/DL (ref 0.5–1.05)
EGFRCR SERPLBLD CKD-EPI 2021: 50 ML/MIN/1.73M*2
ERYTHROCYTE [DISTWIDTH] IN BLOOD BY AUTOMATED COUNT: 16.5 % (ref 11.5–14.5)
GLUCOSE BLD MANUAL STRIP-MCNC: 138 MG/DL (ref 74–99)
GLUCOSE BLD MANUAL STRIP-MCNC: 152 MG/DL (ref 74–99)
GLUCOSE BLD MANUAL STRIP-MCNC: 171 MG/DL (ref 74–99)
GLUCOSE BLD MANUAL STRIP-MCNC: 181 MG/DL (ref 74–99)
GLUCOSE BLD MANUAL STRIP-MCNC: 206 MG/DL (ref 74–99)
GLUCOSE SERPL-MCNC: 200 MG/DL (ref 74–99)
HCT VFR BLD AUTO: 28.8 % (ref 36–46)
HGB BLD-MCNC: 9.2 G/DL (ref 12–16)
HOLD SPECIMEN: NORMAL
LACTATE SERPL-SCNC: 1.4 MMOL/L (ref 0.4–2)
MCH RBC QN AUTO: 26.1 PG (ref 26–34)
MCHC RBC AUTO-ENTMCNC: 31.9 G/DL (ref 32–36)
MCV RBC AUTO: 82 FL (ref 80–100)
NRBC BLD-RTO: 0 /100 WBCS (ref 0–0)
PLATELET # BLD AUTO: 141 X10*3/UL (ref 150–450)
POTASSIUM SERPL-SCNC: 3.8 MMOL/L (ref 3.5–5.3)
RBC # BLD AUTO: 3.52 X10*6/UL (ref 4–5.2)
SODIUM SERPL-SCNC: 138 MMOL/L (ref 136–145)
WBC # BLD AUTO: 6.5 X10*3/UL (ref 4.4–11.3)

## 2024-05-14 PROCEDURE — 82565 ASSAY OF CREATININE: CPT | Performed by: INTERNAL MEDICINE

## 2024-05-14 PROCEDURE — 2500000001 HC RX 250 WO HCPCS SELF ADMINISTERED DRUGS (ALT 637 FOR MEDICARE OP): Performed by: INTERNAL MEDICINE

## 2024-05-14 PROCEDURE — 82947 ASSAY GLUCOSE BLOOD QUANT: CPT

## 2024-05-14 PROCEDURE — 85027 COMPLETE CBC AUTOMATED: CPT | Performed by: INTERNAL MEDICINE

## 2024-05-14 PROCEDURE — 1100000001 HC PRIVATE ROOM DAILY

## 2024-05-14 PROCEDURE — 2500000004 HC RX 250 GENERAL PHARMACY W/ HCPCS (ALT 636 FOR OP/ED): Performed by: INTERNAL MEDICINE

## 2024-05-14 PROCEDURE — 83605 ASSAY OF LACTIC ACID: CPT | Performed by: INTERNAL MEDICINE

## 2024-05-14 PROCEDURE — 36415 COLL VENOUS BLD VENIPUNCTURE: CPT | Performed by: INTERNAL MEDICINE

## 2024-05-14 PROCEDURE — 99233 SBSQ HOSP IP/OBS HIGH 50: CPT | Performed by: INTERNAL MEDICINE

## 2024-05-14 PROCEDURE — 97161 PT EVAL LOW COMPLEX 20 MIN: CPT | Mod: GP | Performed by: PHYSICAL THERAPIST

## 2024-05-14 RX ADMIN — ACETAMINOPHEN 650 MG: 325 TABLET ORAL at 23:07

## 2024-05-14 RX ADMIN — SENNOSIDES 8.6 MG: 8.6 TABLET, FILM COATED ORAL at 21:51

## 2024-05-14 RX ADMIN — MORPHINE SULFATE 60 MG: 60 TABLET, EXTENDED RELEASE ORAL at 08:45

## 2024-05-14 RX ADMIN — ATORVASTATIN CALCIUM 80 MG: 40 TABLET, FILM COATED ORAL at 21:51

## 2024-05-14 RX ADMIN — GLIMEPIRIDE 4 MG: 4 TABLET ORAL at 08:44

## 2024-05-14 RX ADMIN — FAMOTIDINE 20 MG: 10 INJECTION, SOLUTION INTRAVENOUS at 21:51

## 2024-05-14 RX ADMIN — LISINOPRIL 5 MG: 5 TABLET ORAL at 08:44

## 2024-05-14 RX ADMIN — DAPAGLIFLOZIN 10 MG: 5 TABLET, FILM COATED ORAL at 08:44

## 2024-05-14 RX ADMIN — GABAPENTIN 300 MG: 300 CAPSULE ORAL at 21:51

## 2024-05-14 RX ADMIN — HYDROCHLOROTHIAZIDE: 25 TABLET ORAL at 08:45

## 2024-05-14 RX ADMIN — GABAPENTIN 600 MG: 300 CAPSULE ORAL at 21:51

## 2024-05-14 RX ADMIN — APIXABAN 5 MG: 5 TABLET, FILM COATED ORAL at 23:01

## 2024-05-14 RX ADMIN — CEFTRIAXONE SODIUM 1 G: 1 INJECTION, SOLUTION INTRAVENOUS at 23:01

## 2024-05-14 RX ADMIN — APIXABAN 5 MG: 5 TABLET, FILM COATED ORAL at 13:19

## 2024-05-14 RX ADMIN — OXYCODONE HYDROCHLORIDE 10 MG: 10 TABLET ORAL at 19:30

## 2024-05-14 RX ADMIN — OXYCODONE HYDROCHLORIDE 10 MG: 10 TABLET ORAL at 13:19

## 2024-05-14 RX ADMIN — FAMOTIDINE 20 MG: 10 INJECTION, SOLUTION INTRAVENOUS at 08:45

## 2024-05-14 RX ADMIN — PANTOPRAZOLE SODIUM 40 MG: 40 TABLET, DELAYED RELEASE ORAL at 06:00

## 2024-05-14 RX ADMIN — ASPIRIN 81 MG: 81 TABLET, COATED ORAL at 08:44

## 2024-05-14 RX ADMIN — GABAPENTIN 300 MG: 300 CAPSULE ORAL at 08:44

## 2024-05-14 RX ADMIN — MORPHINE SULFATE 60 MG: 60 TABLET, EXTENDED RELEASE ORAL at 21:51

## 2024-05-14 RX ADMIN — GLIMEPIRIDE 4 MG: 4 TABLET ORAL at 21:51

## 2024-05-14 ASSESSMENT — ENCOUNTER SYMPTOMS
ABDOMINAL PAIN: 1
NAUSEA: 0
SPEECH DIFFICULTY: 0
FREQUENCY: 1
NECK PAIN: 0
CHILLS: 1
SORE THROAT: 0
COUGH: 0
FATIGUE: 0
SHORTNESS OF BREATH: 0
CONFUSION: 1
VOMITING: 0
NUMBNESS: 0
BLOOD IN STOOL: 0
WEAKNESS: 1
EYES NEGATIVE: 1
CHEST TIGHTNESS: 0
WHEEZING: 0
DIZZINESS: 0
PALPITATIONS: 0
DYSURIA: 0
FLANK PAIN: 0
BACK PAIN: 0
FEVER: 0
SEIZURES: 0
POLYDIPSIA: 0

## 2024-05-14 ASSESSMENT — PAIN DESCRIPTION - LOCATION
LOCATION: ABDOMEN
LOCATION: ABDOMEN

## 2024-05-14 ASSESSMENT — PAIN SCALES - GENERAL
PAINLEVEL_OUTOF10: 4
PAINLEVEL_OUTOF10: 4
PAINLEVEL_OUTOF10: 6
PAINLEVEL_OUTOF10: 6
PAINLEVEL_OUTOF10: 5 - MODERATE PAIN
PAINLEVEL_OUTOF10: 8

## 2024-05-14 ASSESSMENT — COGNITIVE AND FUNCTIONAL STATUS - GENERAL
STANDING UP FROM CHAIR USING ARMS: A LITTLE
CLIMB 3 TO 5 STEPS WITH RAILING: A LOT
CLIMB 3 TO 5 STEPS WITH RAILING: A LOT
WALKING IN HOSPITAL ROOM: A LITTLE
MOVING FROM LYING ON BACK TO SITTING ON SIDE OF FLAT BED WITH BEDRAILS: A LITTLE
MOVING TO AND FROM BED TO CHAIR: A LITTLE
DAILY ACTIVITIY SCORE: 22
TURNING FROM BACK TO SIDE WHILE IN FLAT BAD: A LITTLE
DRESSING REGULAR UPPER BODY CLOTHING: A LITTLE
STANDING UP FROM CHAIR USING ARMS: A LITTLE
MOBILITY SCORE: 19
TOILETING: A LITTLE
WALKING IN HOSPITAL ROOM: A LOT
MOBILITY SCORE: 17

## 2024-05-14 ASSESSMENT — PAIN - FUNCTIONAL ASSESSMENT
PAIN_FUNCTIONAL_ASSESSMENT: 0-10

## 2024-05-14 ASSESSMENT — PAIN DESCRIPTION - ORIENTATION
ORIENTATION: LOWER
ORIENTATION: LOWER

## 2024-05-14 ASSESSMENT — PAIN DESCRIPTION - DESCRIPTORS
DESCRIPTORS: ACHING
DESCRIPTORS: ACHING

## 2024-05-14 NOTE — INDIVIDUALIZED OVERALL PLAN OF CARE NOTE
Noted of medical necessity    Patient with history of spinal stenosis status post surgery causing chronic lumbar pain and dysfunction.  She also suffers from peripheral neuropathy with decrease in proprioception and ataxia contributing to her progressive ambulatory dysfunction and general functional decline.  Due to the severity and progression of these medical comorbidities Mrs. Romero will require a hospital bed at home to assist with positioning to enable improved breathing with safe movement in and out of bed .    Recommend hospital bed for home.

## 2024-05-14 NOTE — H&P
History Of Present Illness  Antoine Romero is a 73 y.o. female with past medical history of ovarian cancer, diabetes mellitus type 2, hypertension, CVA presenting with altered mental status.  Patient changed level of alertness earlier today.  She denies any focal weakness.  Patient upon arrival to the emergency room had a temperature of 101.9 with a pulse of 89 respirations 24 blood pressure 136/69 sat on room air 94%.  BMP had a BUN of 31 creatinine of 1.23 baseline creatinine approximately 1.0 liver enzymes are unremarkable.  Magnesium of 1.53.  Lactic acid 1.3.  Troponin is 7.  White count is 9.1 hemoglobin of 9.9 platelets 151.  Venous gas unremarkable.  Influenza a and B and coronavirus are all not detected.  Urinalysis with positive nitrates and leukocyte Estrace with 21-50 WBCs.  Chest x-ray with no evidence of acute cardiopulmonary process.  In the emergency room patient was given Tylenol and fever broke.  IV fluid was given the patient and patient at present time is back to baseline mentation.  Patient being admitted to the hospital for UTI with altered mental status.  Noted that family may be having difficult time caring for patient at home.      Past Medical History  She has a past medical history of antineoplastic chemo, Intra-abdominal and pelvic swelling, mass and lump, unspecified site (01/20/2021), Other nonspecific abnormal finding of lung field (01/11/2021), Ovarian cancer (Multi), Personal history of other diseases of the circulatory system (01/20/2021), Personal history of other diseases of the musculoskeletal system and connective tissue, Personal history of other endocrine, nutritional and metabolic disease (01/20/2021), and Personal history of other endocrine, nutritional and metabolic disease (01/20/2021).    Surgical History  She has a past surgical history that includes Other surgical history (02/04/2020); Other surgical history (02/04/2020); Other surgical history (02/04/2020); Other  surgical history (02/04/2020); Other surgical history (02/04/2020); Other surgical history (02/04/2020); MR angio head wo IV contrast (01/16/2023); MR angio neck wo IV contrast (01/16/2023); CT guided percutaneous biopsy liver (06/16/2023); and Hysterectomy.     Social History  She reports that she has never smoked. She has never used smokeless tobacco. She reports current drug use. Drugs: Morphine and Oxycodone. She reports that she does not drink alcohol.    Family History  No family history on file.     Allergies  Sulfamethoxazole-trimethoprim and Metformin    Meds  No current facility-administered medications for this encounter.    Current Outpatient Medications:     albuterol (Ventolin HFA) 90 mcg/actuation inhaler, Inhale 2 puffs every 4 hours if needed for wheezing or shortness of breath., Disp: 8 g, Rfl: 0    albuterol (Ventolin HFA) 90 mcg/actuation inhaler, Inhale 2 puffs every 6 hours if needed for wheezing., Disp: 18 g, Rfl: 0    apixaban (Eliquis) 5 mg tablet, TAKE 1 TABLET BY MOUTH EVERY 12 HOURS, Disp: 180 tablet, Rfl: 3    aspirin 81 mg EC tablet, Take 1 tablet (81 mg) by mouth once daily., Disp: , Rfl:     atorvastatin (Lipitor) 80 mg tablet, Take 1 tablet (80 mg) by mouth once daily at bedtime., Disp: 90 tablet, Rfl: 3    bisoproloL-hydrochlorothiazide (Ziac) 2.5-6.25 mg tablet, Take 1 tablet by mouth once daily., Disp: 90 tablet, Rfl: 2    blood sugar diagnostic (FreeStyle Lite Strips) strip, Inject 1 strip under the skin 2 times a day., Disp: 200 strip, Rfl: 2    cholecalciferol (Vitamin D3) 5,000 Units tablet, Take 2 tablets (10,000 Units) by mouth once daily., Disp: , Rfl:     dapagliflozin propanediol (Farxiga) 10 mg, Take 1 tablet (10 mg) by mouth once daily., Disp: 100 tablet, Rfl: 3    FreeStyle glucose monitoring kit, 1 each if needed (for fasting blood sugar measurement)., Disp: 1 each, Rfl: 0    FreeStyle Lancets 28 gauge, use to test blood sugar once daily, Disp: , Rfl:     gabapentin  (Neurontin) 300 mg capsule, Take 1 capsule (300 mg) by mouth 2 times a day AND 2 capsules (600 mg) once daily at bedtime., Disp: 360 capsule, Rfl: 3    glimepiride (AmaryL) 4 mg tablet, Take 1 tablet (4 mg) by mouth 2 times a day., Disp: 200 tablet, Rfl: 3    lancets misc, Patient to test once daily, Disp: 100 each, Rfl: 3    lisinopril 5 mg tablet, Take 1 tablet (5 mg) by mouth once daily., Disp: 90 tablet, Rfl: 3    meclizine (Antivert) 25 mg tablet, 1 tablet (25 mg)., Disp: , Rfl:     morphine CR (MS Contin) 60 mg 12 hr tablet, Take 1 tablet (60 mg) by mouth 2 times a day. Do not crush, chew, or split., Disp: 60 tablet, Rfl: 0    mv-mn-iron-FA-herbal cmplx#190 (Vitamin D3 Complete) 18 mg iron-800 mcg-150 mg tablet, Take by mouth., Disp: , Rfl:     omeprazole (PriLOSEC) 40 mg DR capsule, Take 1 capsule (40 mg) by mouth once daily. Do not crush or chew., Disp: 90 capsule, Rfl: 3    oxyCODONE (Roxicodone) 10 mg immediate release tablet, Take 1 tablet (10 mg) by mouth every 6 hours if needed for moderate pain (4 - 6)., Disp: 120 tablet, Rfl: 0    sennosides (senna) 8.6 mg tablet, Take by mouth., Disp: , Rfl:     tiZANidine (Zanaflex) 4 mg tablet, TAKE TWO TABLETS BY MOUTH THREE TIMES A DAY AS NEEDED FOR MUSCLE SPASMS, Disp: 180 tablet, Rfl: 0    walker (Ultra-Light Rollator) misc, 1 each see administration instructions., Disp: 1 each, Rfl: 0    Review of Systems   Constitutional:  Positive for chills. Negative for fatigue and fever.   HENT:  Negative for congestion and sore throat.    Eyes: Negative.    Respiratory:  Negative for cough, chest tightness, shortness of breath and wheezing.    Cardiovascular:  Negative for palpitations and leg swelling.   Gastrointestinal:  Positive for abdominal pain (Lower abdominal pain chronic from patient's ovarian malignancy). Negative for blood in stool, nausea and vomiting.   Endocrine: Negative for polydipsia and polyuria.   Genitourinary:  Positive for frequency. Negative for  dysuria and flank pain.   Musculoskeletal:  Negative for back pain, gait problem and neck pain.   Skin: Negative.    Neurological:  Positive for weakness. Negative for dizziness, seizures, speech difficulty and numbness.   Psychiatric/Behavioral:  Positive for confusion.         Physical Exam  Constitutional:       General: She is not in acute distress.     Appearance: Normal appearance. She is not ill-appearing.   HENT:      Head: Normocephalic and atraumatic.      Mouth/Throat:      Mouth: Mucous membranes are moist.   Eyes:      Extraocular Movements: Extraocular movements intact.      Pupils: Pupils are equal, round, and reactive to light.   Cardiovascular:      Rate and Rhythm: Normal rate and regular rhythm.      Pulses: Normal pulses.      Heart sounds: Normal heart sounds.   Pulmonary:      Effort: Pulmonary effort is normal.      Breath sounds: Normal breath sounds.   Abdominal:      General: Abdomen is flat. Bowel sounds are normal.      Palpations: Abdomen is soft.      Tenderness: There is abdominal tenderness (Left lower quadrant most tender slightly also on right lower quadrant). There is no right CVA tenderness, left CVA tenderness, guarding or rebound.   Musculoskeletal:         General: Normal range of motion.   Skin:     Coloration: Skin is not jaundiced.      Findings: No erythema or rash.   Neurological:      General: No focal deficit present.      Mental Status: She is alert and oriented to person, place, and time.   Psychiatric:         Mood and Affect: Mood normal.         Thought Content: Thought content normal.         Judgment: Judgment normal.          Last Recorded Vitals  /70   Pulse 71   Temp 37.2 °C (98.9 °F) (Temporal)   Resp 16   Wt 110 kg (241 lb 6.5 oz)   SpO2 94%     Relevant Results     Results for orders placed or performed during the hospital encounter of 05/13/24 (from the past 24 hour(s))   POCT GLUCOSE   Result Value Ref Range    POCT Glucose 101 (H) 74 - 99 mg/dL    CBC and Auto Differential   Result Value Ref Range    WBC 9.1 4.4 - 11.3 x10*3/uL    nRBC 0.0 0.0 - 0.0 /100 WBCs    RBC 3.82 (L) 4.00 - 5.20 x10*6/uL    Hemoglobin 9.9 (L) 12.0 - 16.0 g/dL    Hematocrit 31.2 (L) 36.0 - 46.0 %    MCV 82 80 - 100 fL    MCH 25.9 (L) 26.0 - 34.0 pg    MCHC 31.7 (L) 32.0 - 36.0 g/dL    RDW 16.5 (H) 11.5 - 14.5 %    Platelets 151 150 - 450 x10*3/uL    Neutrophils % 86.3 40.0 - 80.0 %    Immature Granulocytes %, Automated 0.3 0.0 - 0.9 %    Lymphocytes % 8.1 13.0 - 44.0 %    Monocytes % 4.6 2.0 - 10.0 %    Eosinophils % 0.4 0.0 - 6.0 %    Basophils % 0.3 0.0 - 2.0 %    Neutrophils Absolute 7.86 (H) 1.60 - 5.50 x10*3/uL    Immature Granulocytes Absolute, Automated 0.03 0.00 - 0.50 x10*3/uL    Lymphocytes Absolute 0.74 (L) 0.80 - 3.00 x10*3/uL    Monocytes Absolute 0.42 0.05 - 0.80 x10*3/uL    Eosinophils Absolute 0.04 0.00 - 0.40 x10*3/uL    Basophils Absolute 0.03 0.00 - 0.10 x10*3/uL   Magnesium   Result Value Ref Range    Magnesium 1.53 (L) 1.60 - 2.40 mg/dL   Comprehensive metabolic panel   Result Value Ref Range    Glucose 87 74 - 99 mg/dL    Sodium 135 (L) 136 - 145 mmol/L    Potassium 4.2 3.5 - 5.3 mmol/L    Chloride 102 98 - 107 mmol/L    Bicarbonate 24 21 - 32 mmol/L    Anion Gap 13 10 - 20 mmol/L    Urea Nitrogen 31 (H) 6 - 23 mg/dL    Creatinine 1.23 (H) 0.50 - 1.05 mg/dL    eGFR 47 (L) >60 mL/min/1.73m*2    Calcium 9.2 8.6 - 10.3 mg/dL    Albumin 3.8 3.4 - 5.0 g/dL    Alkaline Phosphatase 45 33 - 136 U/L    Total Protein 6.8 6.4 - 8.2 g/dL    AST 19 9 - 39 U/L    Bilirubin, Total 0.5 0.0 - 1.2 mg/dL    ALT 10 7 - 45 U/L   Lactate   Result Value Ref Range    Lactate 1.3 0.4 - 2.0 mmol/L   Troponin I, High Sensitivity   Result Value Ref Range    Troponin I, High Sensitivity 7 0 - 13 ng/L   B-Type Natriuretic Peptide   Result Value Ref Range    BNP 41 0 - 99 pg/mL   Blood Gas Venous Full Panel   Result Value Ref Range    POCT pH, Venous 7.43 7.33 - 7.43 pH    POCT pCO2, Venous  42 41 - 51 mm Hg    POCT pO2, Venous 35 35 - 45 mm Hg    POCT SO2, Venous 52 45 - 75 %    POCT Oxy Hemoglobin, Venous 51.3 45.0 - 75.0 %    POCT Hematocrit Calculated, Venous 30.0 (L) 36.0 - 46.0 %    POCT Sodium, Venous 134 (L) 136 - 145 mmol/L    POCT Potassium, Venous 4.3 3.5 - 5.3 mmol/L    POCT Chloride, Venous 102 98 - 107 mmol/L    POCT Ionized Calicum, Venous 1.21 1.10 - 1.33 mmol/L    POCT Glucose, Venous 92 74 - 99 mg/dL    POCT Lactate, Venous 1.9 0.4 - 2.0 mmol/L    POCT Base Excess, Venous 3.2 (H) -2.0 - 3.0 mmol/L    POCT HCO3 Calculated, Venous 27.9 (H) 22.0 - 26.0 mmol/L    POCT Hemoglobin, Venous 10.0 (L) 12.0 - 16.0 g/dL    POCT Anion Gap, Venous 8.0 (L) 10.0 - 25.0 mmol/L    Patient Temperature 37.0 degrees Celsius    FiO2 21 %   Influenza A, and B PCR   Result Value Ref Range    Flu A Result Not Detected Not Detected    Flu B Result Not Detected Not Detected   Sars-CoV-2 PCR   Result Value Ref Range    Coronavirus 2019, PCR Not Detected Not Detected   Urinalysis with Reflex Culture and Microscopic   Result Value Ref Range    Color, Urine Yellow Straw, Yellow    Appearance, Urine Hazy (N) Clear    Specific Gravity, Urine 1.012 1.005 - 1.035    pH, Urine 5.0 5.0, 5.5, 6.0, 6.5, 7.0, 7.5, 8.0    Protein, Urine NEGATIVE NEGATIVE mg/dL    Glucose, Urine 150 (2+) (A) NEGATIVE mg/dL    Blood, Urine MODERATE (2+) (A) NEGATIVE    Ketones, Urine NEGATIVE NEGATIVE mg/dL    Bilirubin, Urine NEGATIVE NEGATIVE    Urobilinogen, Urine <2.0 <2.0 mg/dL    Nitrite, Urine POSITIVE (A) NEGATIVE    Leukocyte Esterase, Urine MODERATE (2+) (A) NEGATIVE   Microscopic Only, Urine   Result Value Ref Range    WBC, Urine 21-50 (A) 1-5, NONE /HPF    WBC Clumps, Urine OCCASIONAL Reference range not established. /HPF    RBC, Urine 1-2 NONE, 1-2, 3-5 /HPF    Bacteria, Urine 1+ (A) NONE SEEN /HPF    Mucus, Urine 1+ Reference range not established. /LPF        Recent Imaging  XR chest 2 views    Result Date: 5/13/2024  Interpreted  By:  Leanne Ritter, STUDY: XR CHEST 2 VIEWS;; 5/13/2024 7:23 pm   INDICATION: Signs/Symptoms:fever.   COMPARISON: 07/01/2023   ACCESSION NUMBER(S): [id number]   ORDERING CLINICIAN: CHRISTIAN SOOD   FINDINGS: The cardiac silhouette is stable for the technique. Tortuous aorta but similar to previous exam. Evaluation limited to positioning. No consolidations, effusions, infiltrates or pneumothorax.       No evidence for acute cardiopulmonary process.     Signed by: Leanne Ritter 5/13/2024 7:58 PM Dictation workstation:   [id number]      Assessment and Plan  #1 UTI with altered mental status    Patient's mental status is at baseline once again post breaking of her fever as well as IV fluid hydration.  Patient treated with Rocephin pending cultures.    #2 ovarian malignancy  Patient with chronic abdominal pain at baseline at this time.    #3 generalized weakness  Was physical therapy to assess patient's ambulatory status and safety at home.  I guess there is a question of whether family is able to care for her appropriately at home.    #4 diabetes mellitus type 2  P.o. intake has not been an issue until patient's mental status change but now mental status is back to normal.  Patient should be able to eat appropriately will continue on home meds.    #5 hypertension    #6 history CVA residual minimal      Diallo Lal MD

## 2024-05-14 NOTE — PROGRESS NOTES
"Music Therapy Note    Antoine Romero was referred by RN    Therapy Session  Referral Type: New referral this admission  Visit Type: New visit  Session Start Time: 1106  Session End Time: 1108  Intervention Delivery: In-person  Conflict of Service: None  Number of family members present: 1  Family Participation: Supportive     Pre-assessment  Mood/Affect: Calm, Appropriate  Verbalized Emotional State: Acceptance         Treatment/Interventions  Music Therapy Interventions: Assessment  Interruption: Yes  Interrupted by: Staff  Interruption Outcome: Session ended    Post-assessment  Unable to Assess Reason: Did not provide expressive therapy intervention, Session interrupted  Total Session Time (min): 2 minutes    Narrative  Assessment Detail: Upon arrival of music therapist, pt was seen in room with family at bedside. Pt was alert, awake, expressed she was doing \"alright.\" Pt self reported that she enjoys listening to [employer] music, goes to an [employer] festival every year.  Plan: Music therapist provided music therapy education followed by assess pt's preferred music.  Intervention: Session was interrupted, will follow up at a later time to support pt.  Evaluation: NA  Follow-up: Will follow up at a later time.    Education Documentation  No documentation found.          "

## 2024-05-14 NOTE — PROGRESS NOTES
Antoine Romero is a 73 y.o. female on day 1  of admission presenting with Altered mental status, unspecified altered mental status type.       Subjective     HPI: Antoine Romero is a 73 y.o. female with past medical history of ovarian cancer, diabetes mellitus type 2, hypertension, CVA presenting with altered mental status.  Patient changed level of alertness earlier today.  She denies any focal weakness.  Patient upon arrival to the emergency room had a temperature of 101.9 with a pulse of 89 respirations 24 blood pressure 136/69 sat on room air 94%.  BMP had a BUN of 31 creatinine of 1.23 baseline creatinine approximately 1.0 liver enzymes are unremarkable.  Magnesium of 1.53.  Lactic acid 1.3.  Troponin is 7.  White count is 9.1 hemoglobin of 9.9 platelets 151.  Venous gas unremarkable.  Influenza a and B and coronavirus are all not detected.  Urinalysis with positive nitrates and leukocyte Estrace with 21-50 WBCs.  Chest x-ray with no evidence of acute cardiopulmonary process.  In the emergency room patient was given Tylenol and fever broke.  IV fluid was given the patient and patient at present time is back to baseline mentation.  Patient being admitted to the hospital for UTI with altered mental status.  Noted that family may be having difficult time caring for patient at home.      ED Course Summary:  ED Course as of 05/13/24 2215   Mon May 13, 2024   2044 Nitrite, Urine(!): POSITIVE [CF]   2044 Leukocyte Esterase, Urine(!): MODERATE (2+) [CF]   2044 WBC, Urine(!): 21-50 [CF]   2045 IMPRESSION:  No evidence for acute cardiopulmonary process.       [CF]       ED Course User Index  [CF] Leny Gonzalez MD       5/14: Patient reports she is feeling much better today. Daughter, at bedside, attests her mentation has returned to baseline. She shared that patient had UTI only once before in May 2023 which resulted in a similar presentation but with a much slower onset. Patient attests that she was chronic back  "pain and generalized weakness/neuropathy that have made her dependent on a Rolator or cane to ambulate, has to stop and rest often due to pain. Both are requesting a hospital bed for home going as her current set up poses difficulties ambulating into and out of which create risks to her safety. Anticipate discharge in the next 24 hours.  Patient's metabolic encephalopathy from UTI is clearing.    Objective      Physical Exam    Constitutional: Laying comfortably in bed.     Head and facial: Not ill appearing, good coloration.    Lungs: Clear to auscultation, no increased effort of breathing.     Heart: Regular rate and saul. No m/g/r,    Abdomen: Soft, with tenderness LLQ>RLQ. No flank tenderness nor guarding and rebound.     Extremities: No edema.    Neurologic: A&O x3. No focal deficits appreciated.     Psychiatric/behavioral: Alert, pleasant. Appropriate judgement and insight.       Last Recorded Vitals:  /78 (BP Location: Left arm, Patient Position: 1 minute standing) Comment (Patient Position): patient states \"feeling dizzy\", declined at this time to complete orthos. willing to try again later in morning. RN at bedside and aware  Pulse 78   Temp 36.1 °C (96.9 °F) (Temporal)   Resp 16   Wt 113 kg (249 lb 12.5 oz)   SpO2 96%    Intake/Output Last 24 Hours:   Intake/Output Summary (Last 24 hours) at 5/14/2024 0824  Last data filed at 5/14/2024 0624  Gross per 24 hour   Intake 1637.5 ml   Output 1000 ml   Net 637.5 ml        Relevant Results:  Scheduled medications  apixaban, 5 mg, oral, q12h  aspirin, 81 mg, oral, Daily  atorvastatin, 80 mg, oral, Nightly  bisoprolol 2.5 mg, hydroCHLOROthiazide 6.25 mg for Ziac, , oral, Daily  cefTRIAXone, 1 g, intravenous, q24h  dapagliflozin propanediol, 10 mg, oral, Daily  famotidine, 20 mg, intravenous, BID  gabapentin, 300 mg, oral, BID   And  gabapentin, 600 mg, oral, Nightly  glimepiride, 4 mg, oral, BID  lisinopril, 5 mg, oral, Daily  morphine CR, 60 mg, " oral, BID  pantoprazole, 40 mg, oral, Daily before breakfast  sennosides, 1 tablet, oral, Nightly    Continuous medications  sodium chloride 0.9%, 75 mL/hr, Last Rate: 75 mL/hr (05/14/24 0428)    PRN medications  PRN medications: acetaminophen, acetaminophen, albuterol, meclizine, oxyCODONE       Labs, CXR, and EKG reviewed.  Urine culture pending.       Assessment/Plan    UTI with AMS  Toxic metabolic encephalopathy from UTI  Sepsis from UTI with endorgan dysfunction of of metabolic encephalopathy of the brain.  Generalized weakness  Spinal stenosis with DJD history of surgery  Peripheral neuropathy  Worsening ambulatory dysfunction  Functional decline  Ovarian malignancy (causing chronic abdominal pain at baseline)  DM type 2  HTN  Hx CVA with minimal sequelae     Rocephin 1g IVPB q24h  Eliquis 5 mg PO q12h  Aspirin 81 mg PO daily  Lisinopril 5 mg PO daily  Bisoprolol 2.5 mg, hydrochlorothiazide 6.25 mg (Ziac) PO daily  MS Contin 60 mg PO bid  Farxiga 10 mg PO daily  Amaryl 4 mg PO bid  Neurontin 300 mg PO bid and 600 mg PO nightly  Lipitor 80 mg nightly  Pepcid 20 mg IV bid  Protonix 40 mg PO daily  Senokot 8.6 mg PO nightly   Monitor for urine culture result.  PT/OT to assess ambulatory status.  SW consult appreciated to assess if she's receiving adequate care at home, family's ability in question.  DVT prophylaxsis - continue Eliquis.  Will require medical bed at home  Check a.m. labs.  See orders for complete plan.      LAKESHIA Hopkins     Shared visit with student  Patient seen and examined  Note amended and addended  See my orders for complete plan

## 2024-05-14 NOTE — PROGRESS NOTES
Social work consult placed for positive medical risk screen. SW reviewed pt's chart and communicated with TCC. No SW needs foreseen at this time. SW signing off; available upon request.    Yonis Bearden, MSW, LSW ([id number])   Care Transitions

## 2024-05-14 NOTE — PROGRESS NOTES
05/14/24 1110   Discharge Planning   Living Arrangements Children   Support Systems Children   Assistance Needed yes   Type of Residence Private residence   Home or Post Acute Services In home services   Type of Home Care Services DME or oxygen  (Patient requesting a hospital bed)   Patient expects to be discharged to: Home   Does the patient need discharge transport arranged? No     Met with patient and daughter at bedside, introduced self and role as RN TCC. Patient lives at home with daughter and son in law. Patient is able to care for self somewhat. Uses walker, rollator, cane as needed. Patient has most all DME except a hospital bed which they feel would greatly improve her mobility in bed and assist family caring for her. Dr Gallagher is aware. Will need Progress Note and order and will contact [facility]. Patient is not interested in Home Care services, as she doesn't do well with PT OT coming out to the home. Family is with patient 24/7, patient feels safe returning home. Just hospital bed request. TCC to continue to follow for DC planning.

## 2024-05-14 NOTE — PROGRESS NOTES
"Physical Therapy    Physical Therapy Evaluation    Patient Name: Antoine Romero  MRN: [id number]  Today's Date: 5/14/2024   Time Calculation  Start Time: 1437  Stop Time: 1450  Time Calculation (min): 13 min    Assessment/Plan   PT Assessment  PT Assessment Results: Decreased strength, Decreased endurance, Impaired balance, Decreased mobility, Pain  Rehab Prognosis: Good  Barriers to Discharge: none  Evaluation/Treatment Tolerance: Patient tolerated treatment well (limited distance mildly due to pain/fatigue)  Medical Staff Made Aware: Yes  End of Session Communication: Bedside nurse  Assessment Comment: Patient requires only CGA to slight assist for mobility. Anticipate good improvement over course of admit.  End of Session Patient Position: Bed, 3 rail up, Alarm on  IP OR SWING BED PT PLAN  Inpatient or Swing Bed: Inpatient  PT Plan  Treatment/Interventions: Bed mobility, Transfer training, Gait training, Stair training, Balance training, Strengthening, Endurance training, Therapeutic exercise, Therapeutic activity, Home exercise program  PT Plan: Skilled PT  PT Frequency: 4 times per week  PT Discharge Recommendations: Low intensity level of continued care  PT - OK to Discharge: Yes (when medically cleared)      General Visit Information:  General  Reason for Referral: Dx: Altered mental status, UTI  Referred By: Lukasz  Past Medical History Relevant to Rehab: ovarian CA, DM, HTN, CVA, obesity  Prior to Session Communication: Bedside nurse  Patient Position Received: Bed, 3 rail up, Alarm on  General Comment: Seen in room 3332, external Cool; family present    Home Living:  Home Living  Type of Home:  (Lives with daughter in 1 level home, no \"real steps\" to enter per patient report. Patient amb with FWW, has 24/7 assist available, would like hospital bed.)    Prior Level of Function:       Precautions:  Precautions  Precautions Comment: falls    Vital Signs:     Objective     Pain:  Pain Assessment  Pain Score: " 4  Pain Type:  (abd pain)    Cognition:  Cognition  Overall Cognitive Status:  (Oriented to person, place, time, partially to situation)    General Assessments:      Activity Tolerance  Endurance: Tolerates 10 - 20 min exercise with multiple rests     Strength  Strength Comments: DARNELL LE quads grossly 4/5        Postural Control  Postural Control:  (Sitting balance fair/fair+; standing balance fair with FWW)          Functional Assessments:     Bed Mobility  Bed Mobility:  (Supine to/from sit with CGA assist x 1 and HOB elevated/use of bed rail for assist)  Transfers  Transfer:  (Sit to stand with CGA/Min assist x 1 and FWW; cues for sequencing, safety, balance, posture, AD use, wt shifting)  Ambulation/Gait Training  Ambulation/Gait Training Performed:  (Amb 50 feet with FWW and CGA x1; cues for sequencing, safety, balance, posture, AD use, wt shifting)          Extremity/Trunk Assessments:                Outcome Measures:  [facility] Basic Mobility  Turning from your back to your side while in a flat bed without using bedrails: A little  Moving from lying on your back to sitting on the side of a flat bed without using bedrails: A little  Moving to and from bed to chair (including a wheelchair): A little  Standing up from a chair using your arms (e.g. wheelchair or bedside chair): A little  To walk in hospital room: A little  Climbing 3-5 steps with railing: A lot  Basic Mobility - Total Score: 17                            Goals:  Encounter Problems       Encounter Problems (Active)       PT Problem       transfers       Start:  05/14/24    Expected End:  05/28/24       Patient will perform all transfers with SBA x 1         gait       Start:  05/14/24    Expected End:  05/28/24       Patient will amb 100+ feet with SBA x1          strengthening        Start:  05/14/24    Expected End:  05/28/24       Patient will perform 20+ reps of AROM/RROM for DARNELL LE's to improve safety and functional independence              Pain  - Adult            Education Documentation  Precautions, taught by Mildred Anguiano, PT at 5/14/2024  3:01 PM.  Learner: Patient  Readiness: Acceptance  Method: Explanation  Response: Needs Reinforcement    Mobility Training, taught by Mildred Anguiano, PT at 5/14/2024  3:01 PM.  Learner: Patient  Readiness: Acceptance  Method: Explanation  Response: Needs Reinforcement    Education Comments  No comments found.

## 2024-05-14 NOTE — CARE PLAN
The patient's goals for the shift include      The clinical goals for the shift include Patient will remain free of falls and injury throughout shift      Problem: Pain  Goal: My pain/discomfort is manageable  Outcome: Progressing     Problem: Safety  Goal: Patient will be injury free during hospitalization  Outcome: Progressing  Goal: I will remain free of falls  Outcome: Progressing     Problem: Daily Care  Goal: Daily care needs are met  Outcome: Progressing     Problem: Psychosocial Needs  Goal: Demonstrates ability to cope with hospitalization/illness  Outcome: Progressing  Goal: Collaborate with me, my family, and caregiver to identify my specific goals  Outcome: Progressing     Problem: Discharge Barriers  Goal: My discharge needs are met  Outcome: Progressing     Problem: Fall/Injury  Goal: Not fall by end of shift  Outcome: Progressing  Goal: Be free from injury by end of the shift  Outcome: Progressing  Goal: Verbalize understanding of personal risk factors for fall in the hospital  Outcome: Progressing  Goal: Verbalize understanding of risk factor reduction measures to prevent injury from fall in the home  Outcome: Progressing  Goal: Use assistive devices by end of the shift  Outcome: Progressing  Goal: Pace activities to prevent fatigue by end of the shift  Outcome: Progressing     Problem: Skin  Goal: Decreased wound size/increased tissue granulation at next dressing change  Outcome: Progressing  Goal: Participates in plan/prevention/treatment measures  Outcome: Progressing  Goal: Prevent/manage excess moisture  Outcome: Progressing  Goal: Prevent/minimize sheer/friction injuries  Outcome: Progressing  Goal: Promote/optimize nutrition  Outcome: Progressing  Goal: Promote skin healing  Outcome: Progressing     Problem: Pain - Adult  Goal: Verbalizes/displays adequate comfort level or baseline comfort level  Outcome: Progressing     Problem: Safety - Adult  Goal: Free from fall injury  Outcome:  Progressing     Problem: Discharge Planning  Goal: Discharge to home or other facility with appropriate resources  Outcome: Progressing     Problem: Chronic Conditions and Co-morbidities  Goal: Patient's chronic conditions and co-morbidity symptoms are monitored and maintained or improved  Outcome: Progressing     Problem: Diabetes  Goal: Achieve decreasing blood glucose levels by end of shift  Outcome: Progressing  Goal: Increase stability of blood glucose readings by end of shift  Outcome: Progressing  Goal: Decrease in ketones present in urine by end of shift  Outcome: Progressing  Goal: Maintain electrolyte levels within acceptable range throughout shift  Outcome: Progressing  Goal: Maintain glucose levels >70mg/dl to <250mg/dl throughout shift  Outcome: Progressing  Goal: No changes in neurological exam by end of shift  Outcome: Progressing  Goal: Learn about and adhere to nutrition recommendations by end of shift  Outcome: Progressing  Goal: Vital signs within normal range for age by end of shift  Outcome: Progressing  Goal: Increase self care and/or family involovement by end of shift  Outcome: Progressing  Goal: Receive DSME education by end of shift  Outcome: Progressing     Problem: Pain  Goal: Takes deep breaths with improved pain control throughout the shift  Outcome: Progressing  Goal: Turns in bed with improved pain control throughout the shift  Outcome: Progressing  Goal: Walks with improved pain control throughout the shift  Outcome: Progressing  Goal: Performs ADL's with improved pain control throughout shift  Outcome: Progressing  Goal: Participates in PT with improved pain control throughout the shift  Outcome: Progressing  Goal: Free from opioid side effects throughout the shift  Outcome: Progressing  Goal: Free from acute confusion related to pain meds throughout the shift  Outcome: Progressing

## 2024-05-15 LAB
ANION GAP SERPL CALC-SCNC: 10 MMOL/L (ref 10–20)
BASOPHILS # BLD AUTO: 0.02 X10*3/UL (ref 0–0.1)
BASOPHILS NFR BLD AUTO: 0.4 %
BUN SERPL-MCNC: 24 MG/DL (ref 6–23)
CALCIUM SERPL-MCNC: 9.1 MG/DL (ref 8.6–10.3)
CHLORIDE SERPL-SCNC: 104 MMOL/L (ref 98–107)
CO2 SERPL-SCNC: 27 MMOL/L (ref 21–32)
CREAT SERPL-MCNC: 1.05 MG/DL (ref 0.5–1.05)
EGFRCR SERPLBLD CKD-EPI 2021: 57 ML/MIN/1.73M*2
EOSINOPHIL # BLD AUTO: 0.11 X10*3/UL (ref 0–0.4)
EOSINOPHIL NFR BLD AUTO: 2.1 %
ERYTHROCYTE [DISTWIDTH] IN BLOOD BY AUTOMATED COUNT: 15.9 % (ref 11.5–14.5)
FOLATE SERPL-MCNC: 15.6 NG/ML
GLUCOSE BLD MANUAL STRIP-MCNC: 161 MG/DL (ref 74–99)
GLUCOSE BLD MANUAL STRIP-MCNC: 190 MG/DL (ref 74–99)
GLUCOSE BLD MANUAL STRIP-MCNC: 200 MG/DL (ref 74–99)
GLUCOSE BLD MANUAL STRIP-MCNC: 95 MG/DL (ref 74–99)
GLUCOSE SERPL-MCNC: 127 MG/DL (ref 74–99)
HCT VFR BLD AUTO: 29 % (ref 36–46)
HGB BLD-MCNC: 9.3 G/DL (ref 12–16)
IMM GRANULOCYTES # BLD AUTO: 0.02 X10*3/UL (ref 0–0.5)
IMM GRANULOCYTES NFR BLD AUTO: 0.4 % (ref 0–0.9)
IRON SATN MFR SERPL: 6 % (ref 25–45)
IRON SERPL-MCNC: 21 UG/DL (ref 35–150)
LYMPHOCYTES # BLD AUTO: 1.56 X10*3/UL (ref 0.8–3)
LYMPHOCYTES NFR BLD AUTO: 29.1 %
MAGNESIUM SERPL-MCNC: 1.65 MG/DL (ref 1.6–2.4)
MCH RBC QN AUTO: 26.1 PG (ref 26–34)
MCHC RBC AUTO-ENTMCNC: 32.1 G/DL (ref 32–36)
MCV RBC AUTO: 82 FL (ref 80–100)
MONOCYTES # BLD AUTO: 0.47 X10*3/UL (ref 0.05–0.8)
MONOCYTES NFR BLD AUTO: 8.8 %
NEUTROPHILS # BLD AUTO: 3.18 X10*3/UL (ref 1.6–5.5)
NEUTROPHILS NFR BLD AUTO: 59.2 %
NRBC BLD-RTO: 0 /100 WBCS (ref 0–0)
PLATELET # BLD AUTO: 145 X10*3/UL (ref 150–450)
POTASSIUM SERPL-SCNC: 4 MMOL/L (ref 3.5–5.3)
RBC # BLD AUTO: 3.56 X10*6/UL (ref 4–5.2)
SODIUM SERPL-SCNC: 137 MMOL/L (ref 136–145)
TIBC SERPL-MCNC: 354 UG/DL (ref 240–445)
UIBC SERPL-MCNC: 333 UG/DL (ref 110–370)
VIT B12 SERPL-MCNC: 409 PG/ML (ref 211–911)
WBC # BLD AUTO: 5.4 X10*3/UL (ref 4.4–11.3)

## 2024-05-15 PROCEDURE — 1100000001 HC PRIVATE ROOM DAILY

## 2024-05-15 PROCEDURE — 2500000001 HC RX 250 WO HCPCS SELF ADMINISTERED DRUGS (ALT 637 FOR MEDICARE OP): Performed by: INTERNAL MEDICINE

## 2024-05-15 PROCEDURE — 99232 SBSQ HOSP IP/OBS MODERATE 35: CPT | Performed by: INTERNAL MEDICINE

## 2024-05-15 PROCEDURE — 82947 ASSAY GLUCOSE BLOOD QUANT: CPT | Mod: 91

## 2024-05-15 PROCEDURE — 80048 BASIC METABOLIC PNL TOTAL CA: CPT | Performed by: INTERNAL MEDICINE

## 2024-05-15 PROCEDURE — 83540 ASSAY OF IRON: CPT | Performed by: INTERNAL MEDICINE

## 2024-05-15 PROCEDURE — 2500000004 HC RX 250 GENERAL PHARMACY W/ HCPCS (ALT 636 FOR OP/ED): Performed by: INTERNAL MEDICINE

## 2024-05-15 PROCEDURE — 97116 GAIT TRAINING THERAPY: CPT | Mod: CQ,GP

## 2024-05-15 PROCEDURE — 83735 ASSAY OF MAGNESIUM: CPT | Performed by: INTERNAL MEDICINE

## 2024-05-15 PROCEDURE — 82746 ASSAY OF FOLIC ACID SERUM: CPT | Performed by: INTERNAL MEDICINE

## 2024-05-15 PROCEDURE — 85025 COMPLETE CBC W/AUTO DIFF WBC: CPT | Performed by: INTERNAL MEDICINE

## 2024-05-15 PROCEDURE — 36415 COLL VENOUS BLD VENIPUNCTURE: CPT | Performed by: INTERNAL MEDICINE

## 2024-05-15 PROCEDURE — 82607 VITAMIN B-12: CPT | Performed by: INTERNAL MEDICINE

## 2024-05-15 RX ORDER — CEFDINIR 300 MG/1
300 CAPSULE ORAL 2 TIMES DAILY
Qty: 14 CAPSULE | Refills: 0 | Status: CANCELLED | OUTPATIENT
Start: 2024-05-15 | End: 2024-05-22

## 2024-05-15 RX ADMIN — DAPAGLIFLOZIN 10 MG: 5 TABLET, FILM COATED ORAL at 08:55

## 2024-05-15 RX ADMIN — OXYCODONE HYDROCHLORIDE 10 MG: 10 TABLET ORAL at 10:39

## 2024-05-15 RX ADMIN — GLIMEPIRIDE 4 MG: 4 TABLET ORAL at 20:46

## 2024-05-15 RX ADMIN — ATORVASTATIN CALCIUM 80 MG: 40 TABLET, FILM COATED ORAL at 20:46

## 2024-05-15 RX ADMIN — MORPHINE SULFATE 60 MG: 60 TABLET, EXTENDED RELEASE ORAL at 21:00

## 2024-05-15 RX ADMIN — FAMOTIDINE 20 MG: 10 INJECTION, SOLUTION INTRAVENOUS at 08:59

## 2024-05-15 RX ADMIN — FAMOTIDINE 20 MG: 10 INJECTION, SOLUTION INTRAVENOUS at 20:46

## 2024-05-15 RX ADMIN — PANTOPRAZOLE SODIUM 40 MG: 40 TABLET, DELAYED RELEASE ORAL at 06:49

## 2024-05-15 RX ADMIN — SENNOSIDES 8.6 MG: 8.6 TABLET, FILM COATED ORAL at 20:46

## 2024-05-15 RX ADMIN — APIXABAN 5 MG: 5 TABLET, FILM COATED ORAL at 23:04

## 2024-05-15 RX ADMIN — GABAPENTIN 300 MG: 300 CAPSULE ORAL at 08:58

## 2024-05-15 RX ADMIN — HYDROCHLOROTHIAZIDE: 25 TABLET ORAL at 09:06

## 2024-05-15 RX ADMIN — ASPIRIN 81 MG: 81 TABLET, COATED ORAL at 08:55

## 2024-05-15 RX ADMIN — OXYCODONE HYDROCHLORIDE 10 MG: 10 TABLET ORAL at 01:36

## 2024-05-15 RX ADMIN — OXYCODONE HYDROCHLORIDE 10 MG: 10 TABLET ORAL at 20:52

## 2024-05-15 RX ADMIN — GLIMEPIRIDE 4 MG: 4 TABLET ORAL at 08:58

## 2024-05-15 RX ADMIN — APIXABAN 5 MG: 5 TABLET, FILM COATED ORAL at 10:45

## 2024-05-15 RX ADMIN — GABAPENTIN 300 MG: 300 CAPSULE ORAL at 20:48

## 2024-05-15 RX ADMIN — LISINOPRIL 5 MG: 5 TABLET ORAL at 08:55

## 2024-05-15 RX ADMIN — GABAPENTIN 600 MG: 300 CAPSULE ORAL at 20:47

## 2024-05-15 RX ADMIN — CEFTRIAXONE SODIUM 1 G: 1 INJECTION, SOLUTION INTRAVENOUS at 23:04

## 2024-05-15 RX ADMIN — MORPHINE SULFATE 60 MG: 60 TABLET, EXTENDED RELEASE ORAL at 08:58

## 2024-05-15 ASSESSMENT — COGNITIVE AND FUNCTIONAL STATUS - GENERAL
MOVING TO AND FROM BED TO CHAIR: A LITTLE
DAILY ACTIVITIY SCORE: 18
TURNING FROM BACK TO SIDE WHILE IN FLAT BAD: A LITTLE
HELP NEEDED FOR BATHING: A LITTLE
MOVING FROM LYING ON BACK TO SITTING ON SIDE OF FLAT BED WITH BEDRAILS: A LITTLE
DRESSING REGULAR LOWER BODY CLOTHING: A LITTLE
STANDING UP FROM CHAIR USING ARMS: A LITTLE
MOBILITY SCORE: 17
WALKING IN HOSPITAL ROOM: A LITTLE
HELP NEEDED FOR BATHING: A LITTLE
TURNING FROM BACK TO SIDE WHILE IN FLAT BAD: A LITTLE
PERSONAL GROOMING: A LITTLE
CLIMB 3 TO 5 STEPS WITH RAILING: A LOT
CLIMB 3 TO 5 STEPS WITH RAILING: A LOT
PERSONAL GROOMING: A LITTLE
MOVING FROM LYING ON BACK TO SITTING ON SIDE OF FLAT BED WITH BEDRAILS: A LITTLE
TURNING FROM BACK TO SIDE WHILE IN FLAT BAD: A LITTLE
TOILETING: A LOT
DRESSING REGULAR UPPER BODY CLOTHING: A LITTLE
STANDING UP FROM CHAIR USING ARMS: A LITTLE
WALKING IN HOSPITAL ROOM: A LITTLE
WALKING IN HOSPITAL ROOM: A LITTLE
STANDING UP FROM CHAIR USING ARMS: A LITTLE
DRESSING REGULAR UPPER BODY CLOTHING: A LITTLE
MOBILITY SCORE: 17
DAILY ACTIVITIY SCORE: 18
DRESSING REGULAR LOWER BODY CLOTHING: A LITTLE
MOVING FROM LYING ON BACK TO SITTING ON SIDE OF FLAT BED WITH BEDRAILS: A LITTLE
MOVING TO AND FROM BED TO CHAIR: A LITTLE
CLIMB 3 TO 5 STEPS WITH RAILING: A LOT
MOVING TO AND FROM BED TO CHAIR: A LITTLE
MOBILITY SCORE: 17
TOILETING: A LOT

## 2024-05-15 ASSESSMENT — PAIN SCALES - GENERAL
PAINLEVEL_OUTOF10: 2
PAINLEVEL_OUTOF10: 6
PAINLEVEL_OUTOF10: 4
PAINLEVEL_OUTOF10: 2
PAINLEVEL_OUTOF10: 0 - NO PAIN
PAINLEVEL_OUTOF10: 5 - MODERATE PAIN
PAINLEVEL_OUTOF10: 4
PAINLEVEL_OUTOF10: 4

## 2024-05-15 ASSESSMENT — PAIN DESCRIPTION - ORIENTATION: ORIENTATION: LOWER

## 2024-05-15 ASSESSMENT — PAIN - FUNCTIONAL ASSESSMENT
PAIN_FUNCTIONAL_ASSESSMENT: 0-10

## 2024-05-15 ASSESSMENT — PAIN DESCRIPTION - DESCRIPTORS
DESCRIPTORS: ACHING
DESCRIPTORS: ACHING

## 2024-05-15 ASSESSMENT — PAIN DESCRIPTION - LOCATION
LOCATION: ABDOMEN
LOCATION: ABDOMEN

## 2024-05-15 NOTE — PROGRESS NOTES
Physical Therapy  Physical Therapy Treatment    Patient Name: Antoine Romero  MRN: [id number]  Today's Date: 5/15/2024  Time Calculation  Start Time: 1042  Stop Time: 1105  Time Calculation (min): 23 min    Assessment/Plan   PT Plan  Treatment/Interventions: Bed mobility, Transfer training, Gait training, Stair training, Balance training, Strengthening, Endurance training, Therapeutic exercise, Therapeutic activity, Home exercise program  PT Plan: Skilled PT  PT Frequency: 4 times per week  PT Discharge Recommendations: Low intensity level of continued care  PT - OK to Discharge: Yes (when medically cleared)    General Visit Information:   PT  Visit  PT Received On: 05/15/24  Response to Previous Treatment: Patient with no complaints from previous session.    Reason for Referral: UTI/AMS    Room: 3332    Subjective   Precautions:  falls     Objective   Pain:  Pain Score: 4/10  Pain Type: Chronic pain  Pain Location: Abdomen  Pain Interventions:  Nursing made aware    Cognition:  Oriented X4     Activity Tolerance:  Activity Tolerance  Endurance: Tolerates 30 min exercise with multiple rests    Treatments  Bed Mobility:  Supine to sitting: Close supervision  Sitting to supine: Close supervision  Scooting: Close supervision    Transfers:  Sit to stand: Contact guard  Stand to sit: Contact guard  Transfer Device: Rolling Walker    Ambulation/Gait Trainin feet x1 rep with FWW, CGA  Slow saul, decreased step length  No buckling or gross LOB. Pt limited by fatigue.     Pt sitting in chair following tx. Alarm on and call light in reach.     Outcome Measures:  [facility] Basic Mobility  Turning from your back to your side while in a flat bed without using bedrails: A little  Moving from lying on your back to sitting on the side of a flat bed without using bedrails: A little  Moving to and from bed to chair (including a wheelchair): A little  Standing up from a chair using your arms (e.g. wheelchair or bedside chair): A  little  To walk in hospital room: A little  Climbing 3-5 steps with railing: A lot  Basic Mobility - Total Score: 17    Education Documentation  Mobility Training, taught by Juan Carlos Franks PTA at 5/15/2024 11:51 AM.  Learner: Patient  Readiness: Acceptance  Method: Explanation, Demonstration  Response: Needs Reinforcement    Education Comments  No comments found.        OP EDUCATION:       Encounter Problems       Encounter Problems (Active)       PT Problem       transfers (Progressing)       Start:  05/14/24    Expected End:  05/28/24       Patient will perform all transfers with SBA x 1         gait (Progressing)       Start:  05/14/24    Expected End:  05/28/24       Patient will amb 100+ feet with SBA x1          strengthening  (Progressing)       Start:  05/14/24    Expected End:  05/28/24       Patient will perform 20+ reps of AROM/RROM for DARNELL LE's to improve safety and functional independence              Pain - Adult

## 2024-05-15 NOTE — CARE PLAN
The clinical goals for the shift include Patient will remain free from acute neurological assessment findings during the length of my shift.    Over the shift, the patient did make progress toward the following goals.     Problem: Pain  Goal: My pain/discomfort is manageable  Outcome: Progressing     Problem: Safety  Goal: Patient will be injury free during hospitalization  Outcome: Progressing  Goal: I will remain free of falls  Outcome: Progressing     Problem: Daily Care  Goal: Daily care needs are met  Outcome: Progressing     Problem: Psychosocial Needs  Goal: Demonstrates ability to cope with hospitalization/illness  Outcome: Progressing  Goal: Collaborate with me, my family, and caregiver to identify my specific goals  Outcome: Progressing     Problem: Discharge Barriers  Goal: My discharge needs are met  Outcome: Progressing     Problem: Fall/Injury  Goal: Not fall by end of shift  Outcome: Progressing  Goal: Be free from injury by end of the shift  Outcome: Progressing  Goal: Verbalize understanding of personal risk factors for fall in the hospital  Outcome: Progressing  Goal: Verbalize understanding of risk factor reduction measures to prevent injury from fall in the home  Outcome: Progressing  Goal: Use assistive devices by end of the shift  Outcome: Progressing  Goal: Pace activities to prevent fatigue by end of the shift  Outcome: Progressing     Problem: Skin  Goal: Decreased wound size/increased tissue granulation at next dressing change  Outcome: Progressing  Goal: Participates in plan/prevention/treatment measures  Outcome: Progressing  Goal: Prevent/manage excess moisture  Outcome: Progressing  Goal: Prevent/minimize sheer/friction injuries  Outcome: Progressing  Goal: Promote/optimize nutrition  Outcome: Progressing  Goal: Promote skin healing  Outcome: Progressing     Problem: Pain - Adult  Goal: Verbalizes/displays adequate comfort level or baseline comfort level  Outcome: Progressing      Problem: Safety - Adult  Goal: Free from fall injury  Outcome: Progressing     Problem: Discharge Planning  Goal: Discharge to home or other facility with appropriate resources  Outcome: Progressing     Problem: Chronic Conditions and Co-morbidities  Goal: Patient's chronic conditions and co-morbidity symptoms are monitored and maintained or improved  Outcome: Progressing     Problem: Diabetes  Goal: Achieve decreasing blood glucose levels by end of shift  Outcome: Progressing  Goal: Increase stability of blood glucose readings by end of shift  Outcome: Progressing  Goal: Decrease in ketones present in urine by end of shift  Outcome: Progressing  Goal: Maintain electrolyte levels within acceptable range throughout shift  Outcome: Progressing  Goal: Maintain glucose levels >70mg/dl to <250mg/dl throughout shift  Outcome: Progressing  Goal: No changes in neurological exam by end of shift  Outcome: Progressing  Goal: Learn about and adhere to nutrition recommendations by end of shift  Outcome: Progressing  Goal: Vital signs within normal range for age by end of shift  Outcome: Progressing  Goal: Increase self care and/or family involovement by end of shift  Outcome: Progressing  Goal: Receive DSME education by end of shift  Outcome: Progressing     Problem: Pain  Goal: Takes deep breaths with improved pain control throughout the shift  Outcome: Progressing  Goal: Turns in bed with improved pain control throughout the shift  Outcome: Progressing  Goal: Walks with improved pain control throughout the shift  Outcome: Progressing  Goal: Performs ADL's with improved pain control throughout shift  Outcome: Progressing  Goal: Participates in PT with improved pain control throughout the shift  Outcome: Progressing  Goal: Free from opioid side effects throughout the shift  Outcome: Progressing  Goal: Free from acute confusion related to pain meds throughout the shift  Outcome: Progressing

## 2024-05-15 NOTE — PROGRESS NOTES
Hospital bed ordered and Dr Gallagher note placed. Shira Salcedo from [facility] aware and will arrange for patient to have bed delivered today.     Referral to [facility] , orders have been sent. Awaiting discharge order. Shira from [facility] is arranging delivery for bed. [facility] to continue to follow.

## 2024-05-15 NOTE — PROGRESS NOTES
Antoine Romero is a 73 y.o. female on day 2  of admission presenting with Altered mental status, unspecified altered mental status type.       Subjective     HPI: Antoine Romero is a 73 y.o. female with past medical history of ovarian cancer, diabetes mellitus type 2, hypertension, CVA presenting with altered mental status.  Patient changed level of alertness earlier today.  She denies any focal weakness.  Patient upon arrival to the emergency room had a temperature of 101.9 with a pulse of 89 respirations 24 blood pressure 136/69 sat on room air 94%.  BMP had a BUN of 31 creatinine of 1.23 baseline creatinine approximately 1.0 liver enzymes are unremarkable.  Magnesium of 1.53.  Lactic acid 1.3.  Troponin is 7.  White count is 9.1 hemoglobin of 9.9 platelets 151.  Venous gas unremarkable.  Influenza a and B and coronavirus are all not detected.  Urinalysis with positive nitrates and leukocyte Estrace with 21-50 WBCs.  Chest x-ray with no evidence of acute cardiopulmonary process.  In the emergency room patient was given Tylenol and fever broke.  IV fluid was given the patient and patient at present time is back to baseline mentation.  Patient being admitted to the hospital for UTI with altered mental status.  Noted that family may be having difficult time caring for patient at home.      ED Course Summary:  ED Course as of 05/13/24 2215   Mon May 13, 2024   2044 Nitrite, Urine(!): POSITIVE [CF]   2044 Leukocyte Esterase, Urine(!): MODERATE (2+) [CF]   2044 WBC, Urine(!): 21-50 [CF]   2045 IMPRESSION:  No evidence for acute cardiopulmonary process.       [CF]       ED Course User Index  [CF] Leny Gonzalez MD       5/14: Patient reports she is feeling much better today. Daughter, at bedside, attests her mentation has returned to baseline. She shared that patient had UTI only once before in May 2023 which resulted in a similar presentation but with a much slower onset. Patient attests that she was chronic back  pain and generalized weakness/neuropathy that have made her dependent on a Rolator or cane to ambulate, has to stop and rest often due to pain. Both are requesting a hospital bed for home going as her current set up poses difficulties ambulating into and out of which create risks to her safety. Anticipate discharge in the next 24 hours.  Patient's metabolic encephalopathy from UTI is clearing.    5/15: Patient with pain in her left lower quadrant consistent with her cancer diagnosis.  This is the usual place of the pain.  Patient's hospital bed is being delivered tomorrow not able to maneuver in and out of bed without that being delivered plan on discharge tomorrow morning.      Objective      Physical Exam    Constitutional: Laying comfortably in bed.     Head and facial: Not ill appearing, good coloration.    Lungs: Clear to auscultation, no increased effort of breathing.     Heart: Regular rate and saul. No m/g/r,    Abdomen: Soft, with tenderness LLQ>RLQ. No flank tenderness nor guarding and rebound.     Extremities: No edema.    Neurologic: A&O x3. No focal deficits appreciated.     Psychiatric/behavioral: Alert, pleasant. Appropriate judgement and insight.       Last Recorded Vitals:  /73 (BP Location: Right arm, Patient Position: Lying)   Pulse 65   Temp 35.9 °C (96.7 °F) (Temporal)   Resp 18   Wt 113 kg (249 lb 12.5 oz)   SpO2 95%    Intake/Output Last 24 Hours:   Intake/Output Summary (Last 24 hours) at 5/15/2024 0751  Last data filed at 5/15/2024 0445  Gross per 24 hour   Intake 1660 ml   Output 2550 ml   Net -890 ml        Relevant Results:  Scheduled medications  apixaban, 5 mg, oral, q12h  aspirin, 81 mg, oral, Daily  atorvastatin, 80 mg, oral, Nightly  bisoprolol 2.5 mg, hydroCHLOROthiazide 6.25 mg for Ziac, , oral, Daily  cefTRIAXone, 1 g, intravenous, q24h  dapagliflozin propanediol, 10 mg, oral, Daily  famotidine, 20 mg, intravenous, BID  gabapentin, 300 mg, oral, BID    And  gabapentin, 600 mg, oral, Nightly  glimepiride, 4 mg, oral, BID  lisinopril, 5 mg, oral, Daily  morphine CR, 60 mg, oral, BID  pantoprazole, 40 mg, oral, Daily before breakfast  sennosides, 1 tablet, oral, Nightly    Continuous medications     PRN medications  PRN medications: acetaminophen, acetaminophen, albuterol, meclizine, oxyCODONE       Labs, CXR, and EKG reviewed.  Urine culture pending.       Assessment/Plan    UTI with AMS  Toxic metabolic encephalopathy from UTI  Sepsis from UTI with endorgan dysfunction of of metabolic encephalopathy of the brain.  Generalized weakness  Spinal stenosis with DJD history of surgery  Peripheral neuropathy  Worsening ambulatory dysfunction  Functional decline  Ovarian malignancy (causing chronic abdominal pain at baseline)  DM type 2  HTN  Hx CVA with minimal sequelae   DVT prophylaxsis - continue Eliquis.    Rocephin 1g IVPB q24h  Eliquis 5 mg PO q12h  Aspirin 81 mg PO daily  Lisinopril 5 mg PO daily  Bisoprolol 2.5 mg, hydrochlorothiazide 6.25 mg (Ziac) PO daily  MS Contin 60 mg PO bid  Farxiga 10 mg PO daily  Amaryl 4 mg PO bid  Neurontin 300 mg PO bid and 600 mg PO nightly  Lipitor 80 mg nightly  Pepcid 20 mg IV bid  Protonix 40 mg PO daily  Senokot 8.6 mg PO nightly   Monitor for urine culture result.  PT/OT to assess ambulatory status.  SW consult appreciated to assess if she's receiving adequate care at home, family's ability in question.  Hospital bed to be delivered tomorrow.    Plan discharge tomorrow  See orders for complete plan.      LAKESHIA Hopkins     Shared visit with student  Patient seen and examined  Note amended and addended  See my orders for complete plan

## 2024-05-15 NOTE — PROGRESS NOTES
Occupational Therapy                 Therapy Communication Note    Patient Name: Antoine Romero  MRN: [id number]  Today's Date: 5/15/2024     Discipline: Occupational Therapy    Missed Visit Reason: Missed Visit Reason: Other (Comment) (Patient and dtr. state that pt. just returned to bed, but has assist for any ADLs as needed at home, no O.T. needs.)  Amb. earlier with P.T. Discharge O.T.

## 2024-05-16 LAB
ANION GAP SERPL CALC-SCNC: 14 MMOL/L (ref 10–20)
BACTERIA UR CULT: ABNORMAL
BASOPHILS # BLD AUTO: 0.03 X10*3/UL (ref 0–0.1)
BASOPHILS NFR BLD AUTO: 0.5 %
BUN SERPL-MCNC: 24 MG/DL (ref 6–23)
CALCIUM SERPL-MCNC: 9.7 MG/DL (ref 8.6–10.3)
CHLORIDE SERPL-SCNC: 101 MMOL/L (ref 98–107)
CO2 SERPL-SCNC: 26 MMOL/L (ref 21–32)
CREAT SERPL-MCNC: 1.12 MG/DL (ref 0.5–1.05)
EGFRCR SERPLBLD CKD-EPI 2021: 52 ML/MIN/1.73M*2
EOSINOPHIL # BLD AUTO: 0.05 X10*3/UL (ref 0–0.4)
EOSINOPHIL NFR BLD AUTO: 0.8 %
ERYTHROCYTE [DISTWIDTH] IN BLOOD BY AUTOMATED COUNT: 16 % (ref 11.5–14.5)
GLUCOSE BLD MANUAL STRIP-MCNC: 76 MG/DL (ref 74–99)
GLUCOSE SERPL-MCNC: 109 MG/DL (ref 74–99)
HCT VFR BLD AUTO: 34.6 % (ref 36–46)
HGB BLD-MCNC: 10.9 G/DL (ref 12–16)
IMM GRANULOCYTES # BLD AUTO: 0.02 X10*3/UL (ref 0–0.5)
IMM GRANULOCYTES NFR BLD AUTO: 0.3 % (ref 0–0.9)
LYMPHOCYTES # BLD AUTO: 1.83 X10*3/UL (ref 0.8–3)
LYMPHOCYTES NFR BLD AUTO: 30 %
MAGNESIUM SERPL-MCNC: 1.45 MG/DL (ref 1.6–2.4)
MCH RBC QN AUTO: 25.5 PG (ref 26–34)
MCHC RBC AUTO-ENTMCNC: 31.5 G/DL (ref 32–36)
MCV RBC AUTO: 81 FL (ref 80–100)
MONOCYTES # BLD AUTO: 0.43 X10*3/UL (ref 0.05–0.8)
MONOCYTES NFR BLD AUTO: 7 %
NEUTROPHILS # BLD AUTO: 3.75 X10*3/UL (ref 1.6–5.5)
NEUTROPHILS NFR BLD AUTO: 61.4 %
NRBC BLD-RTO: 0 /100 WBCS (ref 0–0)
PLATELET # BLD AUTO: 184 X10*3/UL (ref 150–450)
POTASSIUM SERPL-SCNC: 4.3 MMOL/L (ref 3.5–5.3)
RBC # BLD AUTO: 4.28 X10*6/UL (ref 4–5.2)
SODIUM SERPL-SCNC: 137 MMOL/L (ref 136–145)
WBC # BLD AUTO: 6.1 X10*3/UL (ref 4.4–11.3)

## 2024-05-16 PROCEDURE — 80048 BASIC METABOLIC PNL TOTAL CA: CPT | Performed by: INTERNAL MEDICINE

## 2024-05-16 PROCEDURE — 2500000004 HC RX 250 GENERAL PHARMACY W/ HCPCS (ALT 636 FOR OP/ED): Performed by: INTERNAL MEDICINE

## 2024-05-16 PROCEDURE — 82947 ASSAY GLUCOSE BLOOD QUANT: CPT

## 2024-05-16 PROCEDURE — 36415 COLL VENOUS BLD VENIPUNCTURE: CPT | Performed by: INTERNAL MEDICINE

## 2024-05-16 PROCEDURE — 85025 COMPLETE CBC W/AUTO DIFF WBC: CPT | Performed by: INTERNAL MEDICINE

## 2024-05-16 PROCEDURE — 97116 GAIT TRAINING THERAPY: CPT | Mod: GP,CQ

## 2024-05-16 PROCEDURE — 99233 SBSQ HOSP IP/OBS HIGH 50: CPT | Performed by: INTERNAL MEDICINE

## 2024-05-16 PROCEDURE — 1100000001 HC PRIVATE ROOM DAILY

## 2024-05-16 PROCEDURE — 2500000001 HC RX 250 WO HCPCS SELF ADMINISTERED DRUGS (ALT 637 FOR MEDICARE OP): Performed by: INTERNAL MEDICINE

## 2024-05-16 PROCEDURE — 83735 ASSAY OF MAGNESIUM: CPT | Performed by: INTERNAL MEDICINE

## 2024-05-16 PROCEDURE — 2500000006 HC RX 250 W HCPCS SELF ADMINISTERED DRUGS (ALT 637 FOR ALL PAYERS): Mod: MUE | Performed by: INTERNAL MEDICINE

## 2024-05-16 RX ORDER — NITROFURANTOIN 25; 75 MG/1; MG/1
100 CAPSULE ORAL EVERY 12 HOURS SCHEDULED
Status: DISCONTINUED | OUTPATIENT
Start: 2024-05-16 | End: 2024-05-17 | Stop reason: HOSPADM

## 2024-05-16 RX ORDER — HYDROCHLOROTHIAZIDE 25 MG/1
6.25 TABLET ORAL DAILY
Status: DISCONTINUED | OUTPATIENT
Start: 2024-05-16 | End: 2024-05-16

## 2024-05-16 RX ORDER — BISOPROLOL FUMARATE 5 MG/1
2.5 TABLET, FILM COATED ORAL DAILY
Status: DISCONTINUED | OUTPATIENT
Start: 2024-05-16 | End: 2024-05-17 | Stop reason: HOSPADM

## 2024-05-16 RX ORDER — ONDANSETRON HYDROCHLORIDE 2 MG/ML
4 INJECTION, SOLUTION INTRAVENOUS
Status: DISCONTINUED | OUTPATIENT
Start: 2024-05-16 | End: 2024-05-17 | Stop reason: HOSPADM

## 2024-05-16 RX ORDER — LOPERAMIDE HYDROCHLORIDE 2 MG/1
4 CAPSULE ORAL ONCE
Status: COMPLETED | OUTPATIENT
Start: 2024-05-16 | End: 2024-05-16

## 2024-05-16 RX ORDER — BISMUTH SUBSALICYLATE 525 MG/30ML
1048 LIQUID ORAL ONCE
Status: COMPLETED | OUTPATIENT
Start: 2024-05-16 | End: 2024-05-16

## 2024-05-16 RX ORDER — METRONIDAZOLE 500 MG/1
500 TABLET ORAL EVERY 8 HOURS SCHEDULED
Status: DISCONTINUED | OUTPATIENT
Start: 2024-05-16 | End: 2024-05-17 | Stop reason: HOSPADM

## 2024-05-16 RX ADMIN — BISMUTH SUBSALICYLATE 1048 MG: 525 LIQUID ORAL at 16:32

## 2024-05-16 RX ADMIN — NITROFURANTOIN (MONOHYDRATE/MACROCRYSTALS) 100 MG: 75; 25 CAPSULE ORAL at 20:08

## 2024-05-16 RX ADMIN — FAMOTIDINE 20 MG: 10 INJECTION, SOLUTION INTRAVENOUS at 20:08

## 2024-05-16 RX ADMIN — ASPIRIN 81 MG: 81 TABLET, COATED ORAL at 08:30

## 2024-05-16 RX ADMIN — GLIMEPIRIDE 4 MG: 4 TABLET ORAL at 20:09

## 2024-05-16 RX ADMIN — PANTOPRAZOLE SODIUM 40 MG: 40 TABLET, DELAYED RELEASE ORAL at 05:36

## 2024-05-16 RX ADMIN — GABAPENTIN 300 MG: 300 CAPSULE ORAL at 08:30

## 2024-05-16 RX ADMIN — LISINOPRIL 5 MG: 5 TABLET ORAL at 08:30

## 2024-05-16 RX ADMIN — OXYCODONE HYDROCHLORIDE 10 MG: 10 TABLET ORAL at 11:57

## 2024-05-16 RX ADMIN — GABAPENTIN 300 MG: 300 CAPSULE ORAL at 20:10

## 2024-05-16 RX ADMIN — MORPHINE SULFATE 60 MG: 60 TABLET, EXTENDED RELEASE ORAL at 20:08

## 2024-05-16 RX ADMIN — OXYCODONE HYDROCHLORIDE 10 MG: 10 TABLET ORAL at 22:52

## 2024-05-16 RX ADMIN — MORPHINE SULFATE 60 MG: 60 TABLET, EXTENDED RELEASE ORAL at 08:30

## 2024-05-16 RX ADMIN — HYDROCHLOROTHIAZIDE 6.25 MG: 25 TABLET ORAL at 08:30

## 2024-05-16 RX ADMIN — DAPAGLIFLOZIN 10 MG: 5 TABLET, FILM COATED ORAL at 08:30

## 2024-05-16 RX ADMIN — LOPERAMIDE HYDROCHLORIDE 4 MG: 2 CAPSULE ORAL at 15:27

## 2024-05-16 RX ADMIN — ATORVASTATIN CALCIUM 80 MG: 40 TABLET, FILM COATED ORAL at 20:09

## 2024-05-16 RX ADMIN — GABAPENTIN 600 MG: 300 CAPSULE ORAL at 20:08

## 2024-05-16 RX ADMIN — BISOPROLOL FUMARATE 2.5 MG: 5 TABLET, FILM COATED ORAL at 08:30

## 2024-05-16 RX ADMIN — APIXABAN 5 MG: 5 TABLET, FILM COATED ORAL at 22:52

## 2024-05-16 RX ADMIN — METRONIDAZOLE 500 MG: 500 TABLET ORAL at 22:52

## 2024-05-16 RX ADMIN — FAMOTIDINE 20 MG: 10 INJECTION, SOLUTION INTRAVENOUS at 08:31

## 2024-05-16 RX ADMIN — GLIMEPIRIDE 4 MG: 4 TABLET ORAL at 08:30

## 2024-05-16 RX ADMIN — APIXABAN 5 MG: 5 TABLET, FILM COATED ORAL at 11:12

## 2024-05-16 RX ADMIN — METRONIDAZOLE 500 MG: 500 TABLET ORAL at 15:27

## 2024-05-16 ASSESSMENT — PAIN SCALES - GENERAL
PAINLEVEL_OUTOF10: 6
PAINLEVEL_OUTOF10: 4
PAINLEVEL_OUTOF10: 5 - MODERATE PAIN
PAINLEVEL_OUTOF10: 8
PAINLEVEL_OUTOF10: 5 - MODERATE PAIN
PAINLEVEL_OUTOF10: 4

## 2024-05-16 ASSESSMENT — COGNITIVE AND FUNCTIONAL STATUS - GENERAL
CLIMB 3 TO 5 STEPS WITH RAILING: A LOT
CLIMB 3 TO 5 STEPS WITH RAILING: A LOT
PERSONAL GROOMING: A LITTLE
WALKING IN HOSPITAL ROOM: A LITTLE
MOBILITY SCORE: 18
HELP NEEDED FOR BATHING: A LITTLE
MOVING FROM LYING ON BACK TO SITTING ON SIDE OF FLAT BED WITH BEDRAILS: A LITTLE
MOBILITY SCORE: 17
WALKING IN HOSPITAL ROOM: A LITTLE
DRESSING REGULAR UPPER BODY CLOTHING: A LITTLE
MOVING FROM LYING ON BACK TO SITTING ON SIDE OF FLAT BED WITH BEDRAILS: A LITTLE
DRESSING REGULAR LOWER BODY CLOTHING: A LITTLE
PERSONAL GROOMING: A LITTLE
MOBILITY SCORE: 17
DRESSING REGULAR UPPER BODY CLOTHING: A LITTLE
TOILETING: A LOT
STANDING UP FROM CHAIR USING ARMS: A LITTLE
TURNING FROM BACK TO SIDE WHILE IN FLAT BAD: A LITTLE
STANDING UP FROM CHAIR USING ARMS: A LITTLE
MOVING TO AND FROM BED TO CHAIR: A LITTLE
MOVING TO AND FROM BED TO CHAIR: A LITTLE
TURNING FROM BACK TO SIDE WHILE IN FLAT BAD: A LITTLE
CLIMB 3 TO 5 STEPS WITH RAILING: A LOT
STANDING UP FROM CHAIR USING ARMS: A LITTLE
DAILY ACTIVITIY SCORE: 18
HELP NEEDED FOR BATHING: A LITTLE
TOILETING: A LOT
DRESSING REGULAR LOWER BODY CLOTHING: A LITTLE
TURNING FROM BACK TO SIDE WHILE IN FLAT BAD: A LITTLE
DAILY ACTIVITIY SCORE: 18
WALKING IN HOSPITAL ROOM: A LITTLE
MOVING TO AND FROM BED TO CHAIR: A LITTLE

## 2024-05-16 ASSESSMENT — PAIN - FUNCTIONAL ASSESSMENT
PAIN_FUNCTIONAL_ASSESSMENT: 0-10

## 2024-05-16 NOTE — PROGRESS NOTES
Physical Therapy    Physical Therapy Treatment    Patient Name: Antoine Romero  MRN: [id number]  Today's Date: 5/16/2024  Time Calculation  Start Time: 1148  Stop Time: 1201  Time Calculation (min): 13 min    Assessment/Plan   PT Assessment  PT Assessment Results: Decreased strength, Decreased range of motion, Decreased endurance, Impaired balance, Decreased mobility  End of Session Communication: Bedside nurse, PCT/NA/CTA  Assessment Comment: irma randall LOB reqruiing min assit to correct.  increased gait to 35 feet  End of Session Patient Position: Bed, 3 rail up, Alarm on  PT Plan  Inpatient/Swing Bed or Outpatient: Inpatient  PT Plan  Treatment/Interventions: Bed mobility, Transfer training, Gait training  PT Plan: Skilled PT  PT Frequency: 4 times per week  PT Discharge Recommendations: Low intensity level of continued care  PT - OK to Discharge: Yes (when medically cleared)      General Visit Information:   PT  Visit  PT Received On: 05/16/24  Response to Previous Treatment: Patient reporting fatigue but able to participate.  General  Prior to Session Communication: Bedside nurse  Patient Position Received: Up in chair, Alarm on  General Comment: patient not felingwell but agres to try    Subjective   Precautions:     Vital Signs:       Objective   Pain:  Pain Assessment  Pain Assessment: 0-10  Pain Score: 5 - Moderate pain  Pain Type: Chronic pain  Pain Location: Abdomen  Cognition:  Cognition  Orientation Level: Oriented X4  Coordination:     Postural Control:     Extremity/Trunk Assessments:    Activity Tolerance:  Activity Tolerance  Endurance: Tolerates less than 10 min exercise, no significant change in vital signs  Treatments:      Bed Mobility  Bed Mobility: No  Bed Mobility 1  Bed Mobility 1: Sitting to supine  Level of Assistance 1: Minimum assistance    Ambulation/Gait Training  Ambulation/Gait Training Performed: Yes  Ambulation/Gait Training 1  Comments/Distance (ft) 1: gait training with FWW 35  feet with min assist and one LOB requiring min assist to correct  Transfers  Transfer: Yes  Transfer 1  Transfer From 1: Sit to  Transfer to 1: Stand  Transfer Level of Assistance 1: Minimum assistance    Outcome Measures:  [facility] Basic Mobility  Turning from your back to your side while in a flat bed without using bedrails: A little  Moving from lying on your back to sitting on the side of a flat bed without using bedrails: A little  Moving to and from bed to chair (including a wheelchair): A little  Standing up from a chair using your arms (e.g. wheelchair or bedside chair): A little  To walk in hospital room: A little  Climbing 3-5 steps with railing: A lot  Basic Mobility - Total Score: 17    Education Documentation  Precautions, taught by Jazmín Gomez PTA at 5/16/2024  1:11 PM.  Learner: Patient  Readiness: Acceptance  Method: Explanation  Response: Verbalizes Understanding    Mobility Training, taught by Jazmín Gomez PTA at 5/16/2024  1:11 PM.  Learner: Patient  Readiness: Acceptance  Method: Explanation  Response: Verbalizes Understanding    Precautions, taught by Jazmín Gomez PTA at 5/16/2024  1:11 PM.  Learner: Patient  Readiness: Acceptance  Method: Explanation  Response: Verbalizes Understanding    Mobility Training, taught by Jazmín Gomez PTA at 5/16/2024  1:11 PM.  Learner: Patient  Readiness: Acceptance  Method: Explanation  Response: Verbalizes Understanding    Education Comments  No comments found.        OP EDUCATION:       Encounter Problems       Encounter Problems (Active)       PT Problem       transfers (Progressing)       Start:  05/14/24    Expected End:  05/28/24       Patient will perform all transfers with SBA x 1         gait (Progressing)       Start:  05/14/24    Expected End:  05/28/24       Patient will amb 100+ feet with SBA x1          strengthening  (Progressing)       Start:  05/14/24    Expected End:  05/28/24       Patient will perform 20+ reps of AROM/RROM for DARNELL LE's  to improve safety and functional independence              Pain - Adult

## 2024-05-16 NOTE — PROGRESS NOTES
Antoine Romero is a 73 y.o. female on day 3  of admission presenting with Altered mental status, unspecified altered mental status type.       Subjective     HPI: Antoine Romero is a 73 y.o. female with past medical history of ovarian cancer, diabetes mellitus type 2, hypertension, CVA presenting with altered mental status.  Patient changed level of alertness earlier today.  She denies any focal weakness.  Patient upon arrival to the emergency room had a temperature of 101.9 with a pulse of 89 respirations 24 blood pressure 136/69 sat on room air 94%.  BMP had a BUN of 31 creatinine of 1.23 baseline creatinine approximately 1.0 liver enzymes are unremarkable.  Magnesium of 1.53.  Lactic acid 1.3.  Troponin is 7.  White count is 9.1 hemoglobin of 9.9 platelets 151.  Venous gas unremarkable.  Influenza a and B and coronavirus are all not detected.  Urinalysis with positive nitrates and leukocyte Estrace with 21-50 WBCs.  Chest x-ray with no evidence of acute cardiopulmonary process.  In the emergency room patient was given Tylenol and fever broke.  IV fluid was given the patient and patient at present time is back to baseline mentation.  Patient being admitted to the hospital for UTI with altered mental status.  Noted that family may be having difficult time caring for patient at home. .     ED Course Summary:  ED Course as of 05/13/24 2215   Mon May 13, 2024   2044 Nitrite, Urine(!): POSITIVE [CF]   2044 Leukocyte Esterase, Urine(!): MODERATE (2+) [CF]   2044 WBC, Urine(!): 21-50 [CF]   2045 IMPRESSION:  No evidence for acute cardiopulmonary process.       [CF]       ED Course User Index  [CF] Leny Gonzalez MD       5/14: Patient reports she is feeling much better today. Daughter, at bedside, attests her mentation has returned to baseline. She shared that patient had UTI only once before in May 2023 which resulted in a similar presentation but with a much slower onset. Patient attests that she was chronic back  pain and generalized weakness/neuropathy that have made her dependent on a Rolator or cane to ambulate, has to stop and rest often due to pain. Both are requesting a hospital bed for home going as her current set up poses difficulties ambulating into and out of which create risks to her safety. Anticipate discharge in the next 24 hours.  Patient's metabolic encephalopathy from UTI is clearing.    5/15: Patient with pain in her left lower quadrant consistent with her cancer diagnosis.  This is the usual place of the pain.  Patient's hospital bed is being delivered tomorrow not able to maneuver in and out of bed without that being delivered plan on discharge tomorrow morning.    5/16: Pleasant anticipating discharge today but patient not feeling well significant nausea dizziness and significant diarrhea.  Will stop IV Rocephin and switch to oral Macrobid.  Gastrin, stone type symptoms empirically will start some Flagyl.  Give a dose of Kaopectate and Imodium.  Stop any type of stool stimulants excetra.  Check labs tomorrow anticipate discharge tomorrow patient not doing well today.      Objective      Physical Exam    Constitutional: Laying comfortably in bed.     Head and facial: Not ill appearing, good coloration.    Lungs: Clear to auscultation, no increased effort of breathing.     Heart: Regular rate and saul. No m/g/r,    Abdomen: Soft, with tenderness LLQ>RLQ. No flank tenderness nor guarding and rebound.     Extremities: No edema.    Neurologic: A&O x3. No focal deficits appreciated.     Psychiatric/behavioral: Alert, pleasant. Appropriate judgement and insight.       Last Recorded Vitals:  /79 (BP Location: Right arm, Patient Position: Lying)   Pulse 66   Temp 35.8 °C (96.4 °F) (Temporal)   Resp 16   Wt 113 kg (249 lb 12.5 oz)   SpO2 96%    Intake/Output Last 24 Hours:   Intake/Output Summary (Last 24 hours) at 5/16/2024 9102  Last data filed at 5/16/2024 0777  Gross per 24 hour   Intake 850 ml    Output 1850 ml   Net -1000 ml        Relevant Results:  Scheduled medications  apixaban, 5 mg, oral, q12h  aspirin, 81 mg, oral, Daily  atorvastatin, 80 mg, oral, Nightly  bisoprolol, 2.5 mg, oral, Daily   And  hydroCHLOROthiazide, 6.25 mg, oral, Daily  cefTRIAXone, 1 g, intravenous, q24h  dapagliflozin propanediol, 10 mg, oral, Daily  famotidine, 20 mg, intravenous, BID  gabapentin, 300 mg, oral, BID   And  gabapentin, 600 mg, oral, Nightly  glimepiride, 4 mg, oral, BID  lisinopril, 5 mg, oral, Daily  morphine CR, 60 mg, oral, BID  pantoprazole, 40 mg, oral, Daily before breakfast  sennosides, 1 tablet, oral, Nightly    Continuous medications     PRN medications  PRN medications: acetaminophen, acetaminophen, albuterol, meclizine, oxyCODONE       Labs, CXR, and EKG reviewed.  Urine culture positive for enteric bacilli 20-80k.      Assessment/Plan    UTI with AMS  Toxic metabolic encephalopathy from UTI  Sepsis from UTI with endorgan dysfunction of of metabolic encephalopathy of the brain.  Generalized weakness  Spinal stenosis with DJD history of surgery  Peripheral neuropathy  Worsening ambulatory dysfunction  Functional decline  Ovarian malignancy (causing chronic abdominal pain at baseline)  DM type 2  HTN  Hx CVA with minimal sequelae   DVT prophylaxsis - continue Eliquis.    Discontinue IV Rocephin  Macrobid 100 mg p.o. twice daily  Short course of oral Flagyl  Dose of Kaopectate  Close of Imodium  Eliquis 5 mg PO q12h  Aspirin 81 mg PO daily  Lisinopril 5 mg PO daily will put on hold  Bisoprolol 2.5 mg, hydrochlorothiazide 6.25 mg (Ziac) PO daily  MS Contin 60 mg PO bid  Farxiga 10 mg PO daily  Amaryl 4 mg PO bid  Neurontin 300 mg PO bid and 600 mg PO nightly  Lipitor 80 mg nightly  Pepcid 20 mg IV bid  Urine culture resulted positive.  Follow sensitivity  PT/OT eval received.  Hospital bed delivered 5/15/24.  Check labs in a.m.  Plan discharge likely tomorrow  See orders for complete plan.      Kami  LAKESHIA Llanes

## 2024-05-17 ENCOUNTER — PHARMACY VISIT (OUTPATIENT)
Dept: PHARMACY | Facility: CLINIC | Age: 74
End: 2024-05-17
Payer: COMMERCIAL

## 2024-05-17 ENCOUNTER — DOCUMENTATION (OUTPATIENT)
Dept: HOME HEALTH SERVICES | Facility: HOME HEALTH | Age: 74
End: 2024-05-17
Payer: COMMERCIAL

## 2024-05-17 ENCOUNTER — HOME HEALTH ADMISSION (OUTPATIENT)
Dept: HOME HEALTH SERVICES | Facility: HOME HEALTH | Age: 74
End: 2024-05-17
Payer: MEDICARE

## 2024-05-17 VITALS
HEIGHT: 71 IN | SYSTOLIC BLOOD PRESSURE: 107 MMHG | RESPIRATION RATE: 18 BRPM | WEIGHT: 249.78 LBS | DIASTOLIC BLOOD PRESSURE: 72 MMHG | OXYGEN SATURATION: 96 % | TEMPERATURE: 97 F | HEART RATE: 77 BPM | BODY MASS INDEX: 34.97 KG/M2

## 2024-05-17 LAB
ANION GAP SERPL CALC-SCNC: 14 MMOL/L (ref 10–20)
BASOPHILS # BLD AUTO: 0.04 X10*3/UL (ref 0–0.1)
BASOPHILS NFR BLD AUTO: 0.6 %
BUN SERPL-MCNC: 25 MG/DL (ref 6–23)
CALCIUM SERPL-MCNC: 9.5 MG/DL (ref 8.6–10.3)
CHLORIDE SERPL-SCNC: 102 MMOL/L (ref 98–107)
CO2 SERPL-SCNC: 24 MMOL/L (ref 21–32)
CREAT SERPL-MCNC: 0.95 MG/DL (ref 0.5–1.05)
EGFRCR SERPLBLD CKD-EPI 2021: 64 ML/MIN/1.73M*2
EOSINOPHIL # BLD AUTO: 0.06 X10*3/UL (ref 0–0.4)
EOSINOPHIL NFR BLD AUTO: 0.9 %
ERYTHROCYTE [DISTWIDTH] IN BLOOD BY AUTOMATED COUNT: 16 % (ref 11.5–14.5)
GLUCOSE SERPL-MCNC: 94 MG/DL (ref 74–99)
HCT VFR BLD AUTO: 33.6 % (ref 36–46)
HGB BLD-MCNC: 10.7 G/DL (ref 12–16)
IMM GRANULOCYTES # BLD AUTO: 0.02 X10*3/UL (ref 0–0.5)
IMM GRANULOCYTES NFR BLD AUTO: 0.3 % (ref 0–0.9)
LYMPHOCYTES # BLD AUTO: 2.45 X10*3/UL (ref 0.8–3)
LYMPHOCYTES NFR BLD AUTO: 37.3 %
MAGNESIUM SERPL-MCNC: 1.56 MG/DL (ref 1.6–2.4)
MCH RBC QN AUTO: 25.5 PG (ref 26–34)
MCHC RBC AUTO-ENTMCNC: 31.8 G/DL (ref 32–36)
MCV RBC AUTO: 80 FL (ref 80–100)
MONOCYTES # BLD AUTO: 0.55 X10*3/UL (ref 0.05–0.8)
MONOCYTES NFR BLD AUTO: 8.4 %
NEUTROPHILS # BLD AUTO: 3.44 X10*3/UL (ref 1.6–5.5)
NEUTROPHILS NFR BLD AUTO: 52.5 %
NRBC BLD-RTO: 0 /100 WBCS (ref 0–0)
PLATELET # BLD AUTO: 189 X10*3/UL (ref 150–450)
POTASSIUM SERPL-SCNC: 3.8 MMOL/L (ref 3.5–5.3)
RBC # BLD AUTO: 4.2 X10*6/UL (ref 4–5.2)
SODIUM SERPL-SCNC: 136 MMOL/L (ref 136–145)
WBC # BLD AUTO: 6.6 X10*3/UL (ref 4.4–11.3)

## 2024-05-17 PROCEDURE — 2500000004 HC RX 250 GENERAL PHARMACY W/ HCPCS (ALT 636 FOR OP/ED): Performed by: INTERNAL MEDICINE

## 2024-05-17 PROCEDURE — 2500000006 HC RX 250 W HCPCS SELF ADMINISTERED DRUGS (ALT 637 FOR ALL PAYERS): Mod: MUE | Performed by: INTERNAL MEDICINE

## 2024-05-17 PROCEDURE — 2500000001 HC RX 250 WO HCPCS SELF ADMINISTERED DRUGS (ALT 637 FOR MEDICARE OP): Performed by: INTERNAL MEDICINE

## 2024-05-17 PROCEDURE — 36415 COLL VENOUS BLD VENIPUNCTURE: CPT | Performed by: INTERNAL MEDICINE

## 2024-05-17 PROCEDURE — 80048 BASIC METABOLIC PNL TOTAL CA: CPT | Performed by: INTERNAL MEDICINE

## 2024-05-17 PROCEDURE — 97110 THERAPEUTIC EXERCISES: CPT | Mod: GP,CQ

## 2024-05-17 PROCEDURE — 99239 HOSP IP/OBS DSCHRG MGMT >30: CPT | Performed by: INTERNAL MEDICINE

## 2024-05-17 PROCEDURE — 85025 COMPLETE CBC W/AUTO DIFF WBC: CPT | Performed by: INTERNAL MEDICINE

## 2024-05-17 PROCEDURE — 97116 GAIT TRAINING THERAPY: CPT | Mod: GP,CQ

## 2024-05-17 PROCEDURE — 83735 ASSAY OF MAGNESIUM: CPT | Performed by: INTERNAL MEDICINE

## 2024-05-17 PROCEDURE — RXMED WILLOW AMBULATORY MEDICATION CHARGE

## 2024-05-17 RX ORDER — LOPERAMIDE HYDROCHLORIDE 2 MG/1
2 CAPSULE ORAL 4 TIMES DAILY PRN
Qty: 30 CAPSULE | Refills: 0 | Status: SHIPPED | OUTPATIENT
Start: 2024-05-17

## 2024-05-17 RX ORDER — ONDANSETRON 4 MG/1
4 TABLET, ORALLY DISINTEGRATING ORAL EVERY 4 HOURS PRN
Qty: 20 TABLET | Refills: 1 | Status: SHIPPED | OUTPATIENT
Start: 2024-05-17

## 2024-05-17 RX ORDER — POTASSIUM CHLORIDE 20 MEQ/1
40 TABLET, EXTENDED RELEASE ORAL ONCE
Status: DISCONTINUED | OUTPATIENT
Start: 2024-05-17 | End: 2024-05-17 | Stop reason: HOSPADM

## 2024-05-17 RX ORDER — ACETAMINOPHEN 325 MG/1
650 TABLET ORAL EVERY 6 HOURS PRN
Qty: 30 TABLET | Refills: 0 | Status: SHIPPED | OUTPATIENT
Start: 2024-05-17 | End: 2024-05-30 | Stop reason: WASHOUT

## 2024-05-17 RX ORDER — NITROFURANTOIN 25; 75 MG/1; MG/1
100 CAPSULE ORAL EVERY 12 HOURS SCHEDULED
Qty: 14 CAPSULE | Refills: 0 | Status: SHIPPED | OUTPATIENT
Start: 2024-05-17 | End: 2024-05-30 | Stop reason: ALTCHOICE

## 2024-05-17 RX ORDER — MAGNESIUM SULFATE HEPTAHYDRATE 40 MG/ML
2 INJECTION, SOLUTION INTRAVENOUS ONCE
Status: DISCONTINUED | OUTPATIENT
Start: 2024-05-17 | End: 2024-05-17 | Stop reason: HOSPADM

## 2024-05-17 RX ADMIN — OXYCODONE HYDROCHLORIDE 10 MG: 10 TABLET ORAL at 12:13

## 2024-05-17 RX ADMIN — GLIMEPIRIDE 4 MG: 4 TABLET ORAL at 08:51

## 2024-05-17 RX ADMIN — OXYCODONE HYDROCHLORIDE 10 MG: 10 TABLET ORAL at 06:02

## 2024-05-17 RX ADMIN — ASPIRIN 81 MG: 81 TABLET, COATED ORAL at 08:52

## 2024-05-17 RX ADMIN — METRONIDAZOLE 500 MG: 500 TABLET ORAL at 05:12

## 2024-05-17 RX ADMIN — GABAPENTIN 300 MG: 300 CAPSULE ORAL at 08:52

## 2024-05-17 RX ADMIN — FAMOTIDINE 20 MG: 10 INJECTION, SOLUTION INTRAVENOUS at 08:52

## 2024-05-17 RX ADMIN — NITROFURANTOIN (MONOHYDRATE/MACROCRYSTALS) 100 MG: 75; 25 CAPSULE ORAL at 08:51

## 2024-05-17 RX ADMIN — DAPAGLIFLOZIN 10 MG: 5 TABLET, FILM COATED ORAL at 08:51

## 2024-05-17 RX ADMIN — MORPHINE SULFATE 60 MG: 60 TABLET, EXTENDED RELEASE ORAL at 08:52

## 2024-05-17 RX ADMIN — APIXABAN 5 MG: 5 TABLET, FILM COATED ORAL at 12:14

## 2024-05-17 RX ADMIN — BISOPROLOL FUMARATE 2.5 MG: 5 TABLET, FILM COATED ORAL at 08:51

## 2024-05-17 ASSESSMENT — COGNITIVE AND FUNCTIONAL STATUS - GENERAL
WALKING IN HOSPITAL ROOM: A LITTLE
STANDING UP FROM CHAIR USING ARMS: A LITTLE
STANDING UP FROM CHAIR USING ARMS: A LITTLE
DRESSING REGULAR LOWER BODY CLOTHING: A LITTLE
WALKING IN HOSPITAL ROOM: A LITTLE
CLIMB 3 TO 5 STEPS WITH RAILING: A LOT
PERSONAL GROOMING: A LITTLE
TOILETING: A LOT
MOVING TO AND FROM BED TO CHAIR: A LITTLE
DAILY ACTIVITIY SCORE: 18
TURNING FROM BACK TO SIDE WHILE IN FLAT BAD: A LITTLE
MOVING FROM LYING ON BACK TO SITTING ON SIDE OF FLAT BED WITH BEDRAILS: A LITTLE
MOVING FROM LYING ON BACK TO SITTING ON SIDE OF FLAT BED WITH BEDRAILS: A LITTLE
MOBILITY SCORE: 17
HELP NEEDED FOR BATHING: A LITTLE
MOVING TO AND FROM BED TO CHAIR: A LITTLE
MOBILITY SCORE: 17
DRESSING REGULAR UPPER BODY CLOTHING: A LITTLE
CLIMB 3 TO 5 STEPS WITH RAILING: A LOT
TURNING FROM BACK TO SIDE WHILE IN FLAT BAD: A LITTLE

## 2024-05-17 ASSESSMENT — PAIN - FUNCTIONAL ASSESSMENT
PAIN_FUNCTIONAL_ASSESSMENT: 0-10

## 2024-05-17 ASSESSMENT — PAIN DESCRIPTION - LOCATION: LOCATION: ABDOMEN

## 2024-05-17 ASSESSMENT — PAIN SCALES - GENERAL
PAINLEVEL_OUTOF10: 4
PAINLEVEL_OUTOF10: 5 - MODERATE PAIN
PAINLEVEL_OUTOF10: 7
PAINLEVEL_OUTOF10: 5 - MODERATE PAIN

## 2024-05-17 ASSESSMENT — PAIN DESCRIPTION - ORIENTATION: ORIENTATION: LOWER

## 2024-05-17 NOTE — PROGRESS NOTES
Physical Therapy    Physical Therapy Treatment    Patient Name: Antoine Romero  MRN: [id number]  Today's Date: 5/17/2024  Time Calculation  Start Time: 0836  Stop Time: 0859  Time Calculation (min): 23 min    Assessment/Plan   PT Assessment  PT Assessment Results: Decreased strength, Decreased range of motion, Decreased endurance, Decreased mobility, Decreased safety awareness  End of Session Communication: Bedside nurse, PCT/NA/CTA  Assessment Comment: patient  increased giat to 80 feet improved posture  and step length  End of Session Patient Position: Up in chair, Alarm on  PT Plan  Inpatient/Swing Bed or Outpatient: Inpatient  PT Plan  Treatment/Interventions: Bed mobility, Transfer training, Gait training, Strengthening, Therapeutic exercise  PT Plan: Skilled PT  PT Frequency: 4 times per week  PT Discharge Recommendations: Low intensity level of continued care  PT - OK to Discharge: Yes (when medically cleared)      General Visit Information:   PT  Visit  PT Received On: 05/17/24  Response to Previous Treatment: Patient reporting fatigue but able to participate.  General  Prior to Session Communication: Bedside nurse  Patient Position Received: Bed, 3 rail up, Alarm on  General Comment: patient  feeling better today ready for therapy    Subjective   Precautions:     Vital Signs:       Objective   Pain:  Pain Assessment  Pain Assessment: 0-10  Pain Score: 5 - Moderate pain  Pain Type: Chronic pain  Pain Location: Abdomen  Pain Interventions: Ambulation/increased activity  Response to Interventions: decreased  Cognition:  Cognition  Orientation Level: Oriented X4  Coordination:     Postural Control:     Extremity/Trunk Assessments:    Activity Tolerance:  Activity Tolerance  Endurance: Tolerates 10 - 20 min exercise with multiple rests  Treatments:  Therapeutic Exercise  Therapeutic Exercise Performed: Yes  Therapeutic Exercise Activity 1: ankle pumps, marches, hip adduction, SAQ         Bed Mobility  Bed  Mobility: Yes  Bed Mobility 1  Bed Mobility 1: Supine to sitting  Level of Assistance 1: Minimum assistance    Ambulation/Gait Training  Ambulation/Gait Training Performed: Yes  Ambulation/Gait Training 1  Comments/Distance (ft) 1: gait training with FWW 80 feet with min assist and verbal cues for safety  Transfers  Transfer: Yes  Transfer 1  Transfer From 1: Sit to  Transfer to 1: Stand, Chair with arms  Transfer Level of Assistance 1: Minimum assistance    Outcome Measures:  [facility] Basic Mobility  Turning from your back to your side while in a flat bed without using bedrails: A little  Moving from lying on your back to sitting on the side of a flat bed without using bedrails: A little  Moving to and from bed to chair (including a wheelchair): A little  Standing up from a chair using your arms (e.g. wheelchair or bedside chair): A little  To walk in hospital room: A little  Climbing 3-5 steps with railing: A lot  Basic Mobility - Total Score: 17    Education Documentation  Precautions, taught by Jazmín Gomez PTA at 5/17/2024  9:30 AM.  Learner: Patient  Readiness: Acceptance  Method: Explanation  Response: Verbalizes Understanding    Mobility Training, taught by Jazmín Gomez PTA at 5/17/2024  9:30 AM.  Learner: Patient  Readiness: Acceptance  Method: Explanation  Response: Verbalizes Understanding    Education Comments  No comments found.        OP EDUCATION:       Encounter Problems       Encounter Problems (Active)       PT Problem       transfers (Progressing)       Start:  05/14/24    Expected End:  05/28/24       Patient will perform all transfers with SBA x 1         gait (Progressing)       Start:  05/14/24    Expected End:  05/28/24       Patient will amb 100+ feet with SBA x1          strengthening  (Progressing)       Start:  05/14/24    Expected End:  05/28/24       Patient will perform 20+ reps of AROM/RROM for DARNELL LE's to improve safety and functional independence              Pain - Adult

## 2024-05-17 NOTE — DISCHARGE SUMMARY
Discharge Diagnosis      UTI with AMS  Toxic metabolic encephalopathy from UTI  Sepsis from E. coli UTI with endorgan dysfunction of of metabolic encephalopathy   Generalized weakness  Spinal stenosis with DJD history of surgery  Peripheral neuropathy  Worsening ambulatory dysfunction  Functional decline  Ovarian malignancy (causing chronic abdominal pain at baseline)  DM type 2  HTN  Hx CVA with minimal sequelae     Issues Requiring Follow-Up  See after visit summary for complete plan discharge home on Macrobid.    Discharge Meds     Your medication list        START taking these medications        Instructions Last Dose Given Next Dose Due   acetaminophen 325 mg tablet  Commonly known as: Tylenol      Take 2 tablets (650 mg) by mouth every 6 hours if needed for fever (temp greater than 38.0 C), mild pain (1 - 3) or headaches (greater than or equal to 38 C).       loperamide 2 mg capsule  Commonly known as: Imodium      Take 1 capsule (2 mg) by mouth 4 times a day as needed for diarrhea.       nitrofurantoin (macrocrystal-monohydrate) 100 mg capsule  Commonly known as: Macrobid      Take 1 capsule (100 mg) by mouth every 12 hours.       ondansetron 4 mg/2 mL injection  Commonly known as: Zofran      Infuse 2 mL (4 mg) into a venous catheter every 4 hours if needed for nausea or vomiting.              CONTINUE taking these medications        Instructions Last Dose Given Next Dose Due   albuterol 90 mcg/actuation inhaler  Commonly known as: Ventolin HFA      Inhale 2 puffs every 4 hours if needed for wheezing or shortness of breath.       albuterol 90 mcg/actuation inhaler  Commonly known as: Ventolin HFA      Inhale 2 puffs every 6 hours if needed for wheezing.       apixaban 5 mg tablet  Commonly known as: Eliquis      TAKE 1 TABLET BY MOUTH EVERY 12 HOURS       aspirin 81 mg EC tablet           atorvastatin 80 mg tablet  Commonly known as: Lipitor      Take 1 tablet (80 mg) by mouth once daily at bedtime.        bisoproloL-hydrochlorothiazide 2.5-6.25 mg tablet  Commonly known as: Ziac      Take 1 tablet by mouth once daily.       Blood glucose monitoring meter kit kit      1 each if needed (for fasting blood sugar measurement).       dapagliflozin propanediol 10 mg  Commonly known as: Farxiga      Take 1 tablet (10 mg) by mouth once daily.       FreeStyle Lite Strips strip  Generic drug: blood sugar diagnostic      Inject 1 strip under the skin 2 times a day.       gabapentin 300 mg capsule  Commonly known as: Neurontin      Take 1 capsule (300 mg) by mouth 2 times a day AND 2 capsules (600 mg) once daily at bedtime.       glimepiride 4 mg tablet  Commonly known as: AmaryL      Take 1 tablet (4 mg) by mouth 2 times a day.       lancets misc      Patient to test once daily       FreeStyle Lancets 28 gauge  Generic drug: FreeStyle lancets           lisinopril 5 mg tablet      Take 1 tablet (5 mg) by mouth once daily.       meclizine 25 mg tablet  Commonly known as: Antivert           morphine CR 60 mg 12 hr tablet  Commonly known as: MS Contin      Take 1 tablet (60 mg) by mouth 2 times a day. Do not crush, chew, or split.       omeprazole 40 mg DR capsule  Commonly known as: PriLOSEC      Take 1 capsule (40 mg) by mouth once daily. Do not crush or chew.       oxyCODONE 10 mg immediate release tablet  Commonly known as: Roxicodone      Take 1 tablet (10 mg) by mouth every 6 hours if needed for moderate pain (4 - 6).       senna 8.6 mg tablet  Generic drug: sennosides           tiZANidine 4 mg tablet  Commonly known as: Zanaflex      TAKE TWO TABLETS BY MOUTH THREE TIMES A DAY AS NEEDED FOR MUSCLE SPASMS       Ultra-Light Rollator misc  Generic drug: walker      1 each see administration instructions.       Vitamin D3 5,000 Units tablet  Generic drug: cholecalciferol           Vitamin D3 Complete 18 mg iron-800 mcg-150 mg tablet  Generic drug: mv-mn-iron-FA-herbal cmplx#[id number]                     Where to Get Your Medications         These medications were sent to [facility]  [address] [address]      Hours: 8AM to 6PM Mon-Fri, 8AM to 4PM Sat-Sun Phone: [phone]   acetaminophen 325 mg tablet  loperamide 2 mg capsule  nitrofurantoin (macrocrystal-monohydrate) 100 mg capsule  ondansetron 4 mg/2 mL injection         Test Results Pending At Discharge  Pending Labs       Order Current Status    Blood Culture Preliminary result    Blood Culture Preliminary result            Hospital Course   HPI: Antoine Romero is a 73 y.o. female with past medical history of ovarian cancer, diabetes mellitus type 2, hypertension, CVA presenting with altered mental status.  Patient changed level of alertness earlier today.  She denies any focal weakness.  Patient upon arrival to the emergency room had a temperature of 101.9 with a pulse of 89 respirations 24 blood pressure 136/69 sat on room air 94%.  BMP had a BUN of 31 creatinine of 1.23 baseline creatinine approximately 1.0 liver enzymes are unremarkable.  Magnesium of 1.53.  Lactic acid 1.3.  Troponin is 7.  White count is 9.1 hemoglobin of 9.9 platelets 151.  Venous gas unremarkable.  Influenza a and B and coronavirus are all not detected.  Urinalysis with positive nitrates and leukocyte Estrace with 21-50 WBCs.  Chest x-ray with no evidence of acute cardiopulmonary process.  In the emergency room patient was given Tylenol and fever broke.  IV fluid was given the patient and patient at present time is back to baseline mentation.  Patient being admitted to the hospital for UTI with altered mental status.  Noted that family may be having difficult time caring for patient at home. .      ED Course Summary:      ED Course as of 05/13/24 2215   Mon May 13, 2024   2044 Nitrite, Urine(!): POSITIVE [CF]   2044 Leukocyte Esterase, Urine(!): MODERATE (2+) [CF]   2044 WBC, Urine(!): 21-50 [CF]   2045 IMPRESSION:  No evidence for acute cardiopulmonary process.       [CF]       ED  Course User Index  [CF] Leny Gonzalez MD         5/14: Patient reports she is feeling much better today. Daughter, at bedside, attests her mentation has returned to baseline. She shared that patient had UTI only once before in May 2023 which resulted in a similar presentation but with a much slower onset. Patient attests that she was chronic back pain and generalized weakness/neuropathy that have made her dependent on a Rolator or cane to ambulate, has to stop and rest often due to pain. Both are requesting a hospital bed for home going as her current set up poses difficulties ambulating into and out of which create risks to her safety. Anticipate discharge in the next 24 hours.  Patient's metabolic encephalopathy from UTI is clearing.     5/15: Patient with pain in her left lower quadrant consistent with her cancer diagnosis.  This is the usual place of the pain.  Patient's hospital bed is being delivered tomorrow not able to maneuver in and out of bed without that being delivered plan on discharge tomorrow morning.     5/16: Pleasant anticipating discharge today but patient not feeling well significant nausea dizziness and significant diarrhea.  Will stop IV Rocephin and switch to oral Macrobid.  Gastrin, stone type symptoms empirically will start some Flagyl.  Give a dose of Kaopectate and Imodium.  Stop any type of stool stimulants excetra.  Check labs tomorrow anticipate discharge tomorrow patient not doing well today.     5/17: Patient's bowels are doing much better today switch to Macrobid.  Clinically doing better will discharge home today.     See after visit summary for complete plan     35 minutes spent in the care of this patient.    Pertinent Physical Exam At Time of Discharge  Physical Exam  See progress for more physical exam findings.  Currently upright in chair  Outpatient Follow-Up  Future Appointments   Date Time Provider Department Center   6/7/2024  1:45 PM [facility] ADMIN ROOM PET CT  [facility]   6/7/2024  2:45 PM P[facility]LE PET CT [facility]   6/12/2024  2:30 PM Bebe Weiss MD [id number] [facility]   7/24/2024  2:00 PM Cristino Cruz APRN-[facility] [id number] [facility]   10/25/2024 11:30 AM Torie Florez APRN-CNP [id number] [facility]         Gurinder Gallagher MD

## 2024-05-17 NOTE — CARE PLAN
The patient's goals for the shift include      The clinical goals for the shift include Patient will remain free from falls and injury throughout this shift      Problem: Pain  Goal: My pain/discomfort is manageable  Outcome: Progressing     Problem: Safety  Goal: Patient will be injury free during hospitalization  Outcome: Progressing  Goal: I will remain free of falls  Outcome: Progressing     Problem: Daily Care  Goal: Daily care needs are met  Outcome: Progressing     Problem: Psychosocial Needs  Goal: Demonstrates ability to cope with hospitalization/illness  Outcome: Progressing  Goal: Collaborate with me, my family, and caregiver to identify my specific goals  Outcome: Progressing     Problem: Discharge Barriers  Goal: My discharge needs are met  Outcome: Progressing     Problem: Fall/Injury  Goal: Not fall by end of shift  Outcome: Progressing  Goal: Be free from injury by end of the shift  Outcome: Progressing  Goal: Verbalize understanding of personal risk factors for fall in the hospital  Outcome: Progressing  Goal: Verbalize understanding of risk factor reduction measures to prevent injury from fall in the home  Outcome: Progressing  Goal: Use assistive devices by end of the shift  Outcome: Progressing  Goal: Pace activities to prevent fatigue by end of the shift  Outcome: Progressing     Problem: Skin  Goal: Decreased wound size/increased tissue granulation at next dressing change  Outcome: Progressing  Goal: Participates in plan/prevention/treatment measures  Outcome: Progressing  Goal: Prevent/manage excess moisture  Outcome: Progressing  Goal: Prevent/minimize sheer/friction injuries  Outcome: Progressing  Goal: Promote/optimize nutrition  Outcome: Progressing  Goal: Promote skin healing  Outcome: Progressing     Problem: Pain - Adult  Goal: Verbalizes/displays adequate comfort level or baseline comfort level  Outcome: Progressing     Problem: Safety - Adult  Goal: Free from fall injury  Outcome:  Progressing     Problem: Discharge Planning  Goal: Discharge to home or other facility with appropriate resources  Outcome: Progressing     Problem: Chronic Conditions and Co-morbidities  Goal: Patient's chronic conditions and co-morbidity symptoms are monitored and maintained or improved  Outcome: Progressing     Problem: Diabetes  Goal: Achieve decreasing blood glucose levels by end of shift  Outcome: Progressing  Goal: Increase stability of blood glucose readings by end of shift  Outcome: Progressing  Goal: Decrease in ketones present in urine by end of shift  Outcome: Progressing  Goal: Maintain electrolyte levels within acceptable range throughout shift  Outcome: Progressing  Goal: Maintain glucose levels >70mg/dl to <250mg/dl throughout shift  Outcome: Progressing  Goal: No changes in neurological exam by end of shift  Outcome: Progressing  Goal: Learn about and adhere to nutrition recommendations by end of shift  Outcome: Progressing  Goal: Vital signs within normal range for age by end of shift  Outcome: Progressing  Goal: Increase self care and/or family involovement by end of shift  Outcome: Progressing  Goal: Receive DSME education by end of shift  Outcome: Progressing     Problem: Pain  Goal: Takes deep breaths with improved pain control throughout the shift  Outcome: Progressing  Goal: Turns in bed with improved pain control throughout the shift  Outcome: Progressing  Goal: Walks with improved pain control throughout the shift  Outcome: Progressing  Goal: Performs ADL's with improved pain control throughout shift  Outcome: Progressing  Goal: Participates in PT with improved pain control throughout the shift  Outcome: Progressing  Goal: Free from opioid side effects throughout the shift  Outcome: Progressing  Goal: Free from acute confusion related to pain meds throughout the shift  Outcome: Progressing

## 2024-05-17 NOTE — PROGRESS NOTES
Antoine Romero is a 73 y.o. female on day 4  of admission presenting with Altered mental status, unspecified altered mental status type.       Subjective     HPI: Antoine Romero is a 73 y.o. female with past medical history of ovarian cancer, diabetes mellitus type 2, hypertension, CVA presenting with altered mental status.  Patient changed level of alertness earlier today.  She denies any focal weakness.  Patient upon arrival to the emergency room had a temperature of 101.9 with a pulse of 89 respirations 24 blood pressure 136/69 sat on room air 94%.  BMP had a BUN of 31 creatinine of 1.23 baseline creatinine approximately 1.0 liver enzymes are unremarkable.  Magnesium of 1.53.  Lactic acid 1.3.  Troponin is 7.  White count is 9.1 hemoglobin of 9.9 platelets 151.  Venous gas unremarkable.  Influenza a and B and coronavirus are all not detected.  Urinalysis with positive nitrates and leukocyte Estrace with 21-50 WBCs.  Chest x-ray with no evidence of acute cardiopulmonary process.  In the emergency room patient was given Tylenol and fever broke.  IV fluid was given the patient and patient at present time is back to baseline mentation.  Patient being admitted to the hospital for UTI with altered mental status.  Noted that family may be having difficult time caring for patient at home. .     ED Course Summary:  ED Course as of 05/13/24 2215   Mon May 13, 2024   2044 Nitrite, Urine(!): POSITIVE [CF]   2044 Leukocyte Esterase, Urine(!): MODERATE (2+) [CF]   2044 WBC, Urine(!): 21-50 [CF]   2045 IMPRESSION:  No evidence for acute cardiopulmonary process.       [CF]       ED Course User Index  [CF] Leny Gonzalez MD       5/14: Patient reports she is feeling much better today. Daughter, at bedside, attests her mentation has returned to baseline. She shared that patient had UTI only once before in May 2023 which resulted in a similar presentation but with a much slower onset. Patient attests that she was chronic back  pain and generalized weakness/neuropathy that have made her dependent on a Rolator or cane to ambulate, has to stop and rest often due to pain. Both are requesting a hospital bed for home going as her current set up poses difficulties ambulating into and out of which create risks to her safety. Anticipate discharge in the next 24 hours.  Patient's metabolic encephalopathy from UTI is clearing.    5/15: Patient with pain in her left lower quadrant consistent with her cancer diagnosis.  This is the usual place of the pain.  Patient's hospital bed is being delivered tomorrow not able to maneuver in and out of bed without that being delivered plan on discharge tomorrow morning.    5/16: Pleasant anticipating discharge today but patient not feeling well significant nausea dizziness and significant diarrhea.  Will stop IV Rocephin and switch to oral Macrobid.  Gastrin, stone type symptoms empirically will start some Flagyl.  Give a dose of Kaopectate and Imodium.  Stop any type of stool stimulants excetra.  Check labs tomorrow anticipate discharge tomorrow patient not doing well today.    5/17: Patient's bowels are doing much better today switch to Macrobid.  Clinically doing better will discharge home today.    See after visit summary for complete plan    35 minutes spent in the care of this patient.    Objective      Physical Exam    Constitutional: Laying comfortably in bed.     Head and facial: Not ill appearing, good coloration.    Lungs: Clear to auscultation, no increased effort of breathing.     Heart: Regular rate and saul. No m/g/r,    Abdomen: Soft, with tenderness LLQ>RLQ. No flank tenderness nor guarding and rebound.     Extremities: No edema.    Neurologic: A&O x3. No focal deficits appreciated.     Psychiatric/behavioral: Alert, pleasant. Appropriate judgement and insight.       Last Recorded Vitals:  /72 (BP Location: Left arm, Patient Position: Sitting)   Pulse 77   Temp 36.1 °C (97 °F)  (Temporal)   Resp 18   Wt 113 kg (249 lb 12.5 oz)   SpO2 96%    Intake/Output Last 24 Hours:   Intake/Output Summary (Last 24 hours) at 5/17/2024 1137  Last data filed at 5/17/2024 0940  Gross per 24 hour   Intake 800 ml   Output 0 ml   Net 800 ml        Relevant Results:  Scheduled medications  apixaban, 5 mg, oral, q12h  aspirin, 81 mg, oral, Daily  atorvastatin, 80 mg, oral, Nightly  bisoprolol, 2.5 mg, oral, Daily  dapagliflozin propanediol, 10 mg, oral, Daily  famotidine, 20 mg, intravenous, BID  gabapentin, 300 mg, oral, BID   And  gabapentin, 600 mg, oral, Nightly  glimepiride, 4 mg, oral, BID  [Held by provider] lisinopril, 5 mg, oral, Daily  magnesium sulfate, 2 g, intravenous, Once  metroNIDAZOLE, 500 mg, oral, q8h NASH  morphine CR, 60 mg, oral, BID  nitrofurantoin (macrocrystal-monohydrate), 100 mg, oral, q12h NASH  potassium chloride CR, 40 mEq, oral, Once    Continuous medications     PRN medications  PRN medications: acetaminophen, acetaminophen, albuterol, meclizine, ondansetron, oxyCODONE       Labs, CXR, and EKG reviewed.  Urine culture positive for enteric bacilli 20-80k.      Assessment/Plan    UTI with AMS  Toxic metabolic encephalopathy from UTI  Sepsis from UTI with endorgan dysfunction of of metabolic encephalopathy of the brain.  Generalized weakness  Spinal stenosis with DJD history of surgery  Peripheral neuropathy  Worsening ambulatory dysfunction  Functional decline  Ovarian malignancy (causing chronic abdominal pain at baseline)  DM type 2  HTN  Hx CVA with minimal sequelae   DVT prophylaxsis - continue Eliquis.    See after visit summary for complete plan   Home on 7 days of Macrobid.      GRABIEL HopkinsS

## 2024-05-17 NOTE — HH CARE COORDINATION
Home Care received a Referral for Nursing, Physical Therapy, and Occupational Therapy. We have processed the referral for a Start of Care on 5.18 or 5.19.24.     If you have any questions or concerns, please feel free to contact us at [phone]. Follow the prompts, enter your five digit zip code, and you will be directed to your care team on [facility].

## 2024-05-18 LAB
BACTERIA BLD CULT: NORMAL
BACTERIA BLD CULT: NORMAL

## 2024-05-20 ENCOUNTER — PATIENT OUTREACH (OUTPATIENT)
Dept: PRIMARY CARE | Facility: CLINIC | Age: 74
End: 2024-05-20

## 2024-05-20 NOTE — PROGRESS NOTES
No contact on first discharge outreach attempt. Message left. Will attempt outreach again at later time.     Minoxidil Counseling: Minoxidil is a topical medication which can increase blood flow where it is applied. It is uncertain how this medication increases hair growth. Side effects are uncommon and include stinging and allergic reactions.

## 2024-05-21 ENCOUNTER — PATIENT OUTREACH (OUTPATIENT)
Dept: PRIMARY CARE | Facility: CLINIC | Age: 74
End: 2024-05-21

## 2024-05-21 NOTE — PROGRESS NOTES
Discharge Facility: [facility]  Discharge Diagnosis: Altered Mental Status/UTI  Admission Date: 13 May 24  Discharge Date: 17 May 24    PCP Appointment Date: Tasked to office for scheduling.   Specialist Appointment Date: 12 June 24 (Hem Onc, Isela)   Hospital Encounter and Summary: Linked    See discharge assessment below for further details     Engagement  Call Start Time: 0840 (5/21/2024  8:51 AM)    Medications  Medications reviewed with patient/caregiver?: Yes (5/21/2024  8:51 AM)  Is the patient having any side effects they believe may be caused by any medication additions or changes?: No (5/21/2024  8:51 AM)  Does the patient have all medications ordered at discharge?: Yes (5/21/2024  8:51 AM)  Prescription Comments: None (5/21/2024  8:51 AM)  Is the patient taking all medications as directed (includes completed medication regime)?: Yes (5/21/2024  8:51 AM)  Medication Comments: None (5/21/2024  8:51 AM)    Appointments  Does the patient have a primary care provider?: Yes (Tasked to office for scheduling.) (5/21/2024  8:51 AM)  Has the patient kept scheduled appointments due by today?: Yes (5/21/2024  8:51 AM)    Self Management  What is the home health agency?: [facility], refused by patient. (5/21/2024  8:51 AM)  Has home health visited the patient within 72 hours of discharge?: No (5/21/2024  8:51 AM)  What Durable Medical Equipment (DME) was ordered?: N/A (5/21/2024  8:51 AM)    Patient Teaching  Does the patient have access to their discharge instructions?: Yes (5/21/2024  8:51 AM)  Care Management Interventions: Reviewed instructions with patient (5/21/2024  8:51 AM)  What is the patient's perception of their health status since discharge?: Improving (5/21/2024  8:51 AM)  Is the patient/caregiver able to teach back the hierarchy of who to call/visit for symptoms/problems? PCP, Specialist, Home Health nurse, Urgent Care, ED, 911: Yes (5/21/2024  8:51 AM)  Patient/Caregiver Education Comments: None (5/21/2024   8:51 AM)    Wrap Up  Wrap Up Additional Comments: None (5/21/2024  8:51 AM)  Call End Time: 0850 (5/21/2024  8:51 AM)     Pt's daughter grateful for outreach call and is agreeable to being contacted in 2 wks to see how they are doing.

## 2024-05-22 ENCOUNTER — TELEPHONE (OUTPATIENT)
Dept: PRIMARY CARE | Facility: CLINIC | Age: 74
End: 2024-05-22

## 2024-05-22 LAB
ATRIAL RATE: 86 BPM
P AXIS: 48 DEGREES
PR INTERVAL: 194 MS
Q ONSET: 252 MS
QRS COUNT: 14 BEATS
QRS DURATION: 109 MS
QT INTERVAL: 367 MS
QTC CALCULATION(BAZETT): 439 MS
QTC FREDERICIA: 414 MS
R AXIS: -41 DEGREES
T AXIS: 34 DEGREES
T OFFSET: 436 MS
VENTRICULAR RATE: 86 BPM

## 2024-05-22 NOTE — TELEPHONE ENCOUNTER
----- Message from MINISTERIO Townsend sent at 5/21/2024  5:46 PM EDT -----  Regarding: RE: TCM complete. No open times for me to schedule within 2 wks.  She can be scheduled with any other provider with availability.  ----- Message -----  From: Moon Noriega MA  Sent: 5/21/2024   9:15 AM EDT  To: MINISTERIO Townsend  Subject: FW: TCM complete. No open times for me to sc#    Would you like to see the patient?  ----- Message -----  From: Tiffanie Cervantes  Sent: 5/21/2024   8:59 AM EDT  To: Moon Noriega MA  Subject: FW: TCM complete. No open times for me to sc#    Where can we put her?  ----- Message -----  From: Manuel Calvin RN  Sent: 5/21/2024   8:51 AM EDT  To: [facility]  Subject: TCM complete. No open times for me to schedu#    TCM complete. No open times for me to schedule within 2 days  Can you please contact pt to schedule hosp  follow up within 14 days of discharge.  This patient was discharged from: [facility]  Discharge diagnosis: Altered Mental Status/UTI    Date of discharge: 17 May 24         Thank you

## 2024-05-29 ENCOUNTER — APPOINTMENT (OUTPATIENT)
Dept: PRIMARY CARE | Facility: CLINIC | Age: 74
End: 2024-05-29
Payer: COMMERCIAL

## 2024-05-30 ENCOUNTER — OFFICE VISIT (OUTPATIENT)
Dept: PRIMARY CARE | Facility: CLINIC | Age: 74
End: 2024-05-30
Payer: MEDICARE

## 2024-05-30 VITALS
BODY MASS INDEX: 31.72 KG/M2 | RESPIRATION RATE: 18 BRPM | HEIGHT: 72 IN | SYSTOLIC BLOOD PRESSURE: 100 MMHG | OXYGEN SATURATION: 97 % | HEART RATE: 78 BPM | WEIGHT: 234.2 LBS | DIASTOLIC BLOOD PRESSURE: 60 MMHG

## 2024-05-30 DIAGNOSIS — N30.00 ACUTE CYSTITIS WITHOUT HEMATURIA: Primary | ICD-10-CM

## 2024-05-30 PROBLEM — E66.01 SEVERE OBESITY (MULTI): Status: ACTIVE | Noted: 2024-05-30

## 2024-05-30 PROBLEM — I71.21 ANEURYSM OF THE ASCENDING AORTA, WITHOUT RUPTURE (CMS-HCC): Status: ACTIVE | Noted: 2024-05-30

## 2024-05-30 PROCEDURE — 3074F SYST BP LT 130 MM HG: CPT

## 2024-05-30 PROCEDURE — 3062F POS MACROALBUMINURIA REV: CPT

## 2024-05-30 PROCEDURE — 3078F DIAST BP <80 MM HG: CPT

## 2024-05-30 PROCEDURE — 4010F ACE/ARB THERAPY RXD/TAKEN: CPT

## 2024-05-30 PROCEDURE — 1157F ADVNC CARE PLAN IN RCRD: CPT

## 2024-05-30 PROCEDURE — 1125F AMNT PAIN NOTED PAIN PRSNT: CPT

## 2024-05-30 PROCEDURE — 99495 TRANSJ CARE MGMT MOD F2F 14D: CPT

## 2024-05-30 PROCEDURE — 3008F BODY MASS INDEX DOCD: CPT

## 2024-05-30 PROCEDURE — 1036F TOBACCO NON-USER: CPT

## 2024-05-30 PROCEDURE — 1111F DSCHRG MED/CURRENT MED MERGE: CPT

## 2024-05-30 PROCEDURE — 1160F RVW MEDS BY RX/DR IN RCRD: CPT

## 2024-05-30 PROCEDURE — 1159F MED LIST DOCD IN RCRD: CPT

## 2024-05-30 PROCEDURE — 3048F LDL-C <100 MG/DL: CPT

## 2024-05-30 PROCEDURE — 3052F HG A1C>EQUAL 8.0%<EQUAL 9.0%: CPT

## 2024-05-30 ASSESSMENT — ENCOUNTER SYMPTOMS
OCCASIONAL FEELINGS OF UNSTEADINESS: 0
NEUROLOGICAL NEGATIVE: 1
CONSTITUTIONAL NEGATIVE: 1
EYES NEGATIVE: 1
HEMATOLOGIC/LYMPHATIC NEGATIVE: 1
DEPRESSION: 0
MUSCULOSKELETAL NEGATIVE: 1
ENDOCRINE NEGATIVE: 1
GASTROINTESTINAL NEGATIVE: 1
RESPIRATORY NEGATIVE: 1
PSYCHIATRIC NEGATIVE: 1
CARDIOVASCULAR NEGATIVE: 1
ALLERGIC/IMMUNOLOGIC NEGATIVE: 1
LOSS OF SENSATION IN FEET: 0

## 2024-05-30 ASSESSMENT — ANXIETY QUESTIONNAIRES
4. TROUBLE RELAXING: NOT AT ALL
1. FEELING NERVOUS, ANXIOUS, OR ON EDGE: NOT AT ALL
5. BEING SO RESTLESS THAT IT IS HARD TO SIT STILL: NOT AT ALL
6. BECOMING EASILY ANNOYED OR IRRITABLE: NOT AT ALL
2. NOT BEING ABLE TO STOP OR CONTROL WORRYING: NOT AT ALL
7. FEELING AFRAID AS IF SOMETHING AWFUL MIGHT HAPPEN: NOT AT ALL
GAD7 TOTAL SCORE: 0
3. WORRYING TOO MUCH ABOUT DIFFERENT THINGS: NOT AT ALL

## 2024-05-30 ASSESSMENT — PAIN SCALES - GENERAL: PAINLEVEL: 6

## 2024-05-30 NOTE — PROGRESS NOTES
"Patient: Antoine Romero  : 1950  PCP: Cristino Cruz, APRN-CNP  MRN: [id number]  Program: Transitional Care Management  Status: Enrolled  Effective Dates: 2024 - present  Responsible Staff: Manuel Calvin RN  Social Determinants to be Addressed: No information to display         Antoine Romero is a 73 y.o. female presenting today for follow-up after being discharged from the hospital 13 days ago. The main problem requiring admission was urinary tract infection and altered mental status. The discharge summary and/or Transitional Care Management documentation was reviewed. Medication reconciliation was performed as indicated via the \"Barry as Reviewed\" timestamp.     Antoine Romero was contacted by Transitional Care Management services two days after her discharge. This encounter and supporting documentation was reviewed.  Patient in office for follow up after hospital stay with daughter. Was sent home on oral nitrofurantion that patient has finished. Denies any continued signs or symptoms of UTI.  No burning with urination, urinary frequency, incomplete urination. Daughter states is back to normal mentation. Denies any abdominal pain, lower back pain or fevers. States has increase water intake since being discharged home, this is something that she was not good at prior. Daughter has been helping making sure she is taking in adequate liquids.     Review of Systems   Constitutional: Negative.    HENT: Negative.     Eyes: Negative.    Respiratory: Negative.     Cardiovascular: Negative.    Gastrointestinal: Negative.    Endocrine: Negative.    Genitourinary: Negative.    Musculoskeletal: Negative.    Skin: Negative.    Allergic/Immunologic: Negative.    Neurological: Negative.    Hematological: Negative.    Psychiatric/Behavioral: Negative.     All other systems reviewed and are negative.      /60 (BP Location: Right arm, Patient Position: Sitting, BP Cuff Size: Adult)   Pulse 78   Resp 18   Ht 1.829 " m (6')   Wt 106 kg (234 lb 3.2 oz)   SpO2 97%   BMI 31.76 kg/m²     Physical Exam  Constitutional:       Appearance: Normal appearance.   HENT:      Head: Normocephalic and atraumatic.      Nose: Nose normal.      Mouth/Throat:      Mouth: Mucous membranes are moist.      Pharynx: Oropharynx is clear.   Eyes:      Pupils: Pupils are equal, round, and reactive to light.   Cardiovascular:      Rate and Rhythm: Normal rate and regular rhythm.      Pulses: Normal pulses.      Heart sounds: Normal heart sounds.   Pulmonary:      Effort: Pulmonary effort is normal.      Breath sounds: Normal breath sounds.   Abdominal:      General: Bowel sounds are normal.      Palpations: Abdomen is soft.   Musculoskeletal:         General: Normal range of motion.      Cervical back: Normal range of motion.   Skin:     General: Skin is warm and dry.   Neurological:      General: No focal deficit present.      Mental Status: She is alert and oriented to person, place, and time.   Psychiatric:         Mood and Affect: Mood normal.         Behavior: Behavior normal.         Thought Content: Thought content normal.         Judgment: Judgment normal.         The complexity of medical decision making for this patient's transitional care is moderate.    Assessment/Plan   Problem List Items Addressed This Visit             ICD-10-CM    UTI (urinary tract infection) - Primary N39.0   Monitor for sighs and symptoms of UTI such as burning with urination, urinary frequency, incomplete voiding, Increase in fever, abdominal pain, back pain and/or  change in mental status. If you feel any of these symptoms are occurring call the office.  Information packet on UTI in adults sent with patient.

## 2024-05-30 NOTE — PATIENT INSTRUCTIONS
Monitor for sighs and symptoms of UTI such as burning with urination, urinary frequency, incomplete voiding, Increase in fever, abdominal pain, back pain and/or  change in mental status. If you feel any of these symptoms are occurring call the office.  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

## 2024-05-30 NOTE — PROGRESS NOTES
"Patient: Antoine Romero  : 1950  PCP: Cristino Cruz, DAMIAN-CNP  MRN: [id number]  Program: Transitional Care Management  Status: Enrolled  Effective Dates: 2024 - present  Responsible Staff: Maneul Calvin RN  Social Determinants to be Addressed: No information to display         Antoine Romero is a 73 y.o. female presenting today for follow-up after being discharged from the hospital 13 days ago. The main problem requiring admission was Urinary tract infection and alerted mental status. The discharge summary and/or Transitional Care Management documentation was reviewed. Medication reconciliation was performed as indicated via the \"Barry as Reviewed\" timestamp.     Antoine Romero was contacted by Transitional Care Management services two days after her discharge. This encounter and supporting documentation was reviewed.    Review of Systems    /60 (BP Location: Right arm, Patient Position: Sitting, BP Cuff Size: Adult)   Pulse 78   Resp 18   Ht 1.829 m (6')   Wt 106 kg (234 lb 3.2 oz)   SpO2 97%   BMI 31.76 kg/m²     Physical Exam    The complexity of medical decision making for this patient's transitional care is {DESC; MODERATE/HIGH:25379}.    Assessment/Plan   {Assess/PlanSmartLinks:64861}    "

## 2024-06-03 DIAGNOSIS — I63.9 ISCHEMIC CEREBROVASCULAR ACCIDENT (CVA) DUE TO HYPERCOAGULABLE STATE (MULTI): ICD-10-CM

## 2024-06-03 DIAGNOSIS — D68.59 ISCHEMIC CEREBROVASCULAR ACCIDENT (CVA) DUE TO HYPERCOAGULABLE STATE (MULTI): ICD-10-CM

## 2024-06-03 DIAGNOSIS — E11.40 CONTROLLED TYPE 2 DIABETES MELLITUS WITH DIABETIC NEUROPATHY, WITHOUT LONG-TERM CURRENT USE OF INSULIN (MULTI): Primary | ICD-10-CM

## 2024-06-04 ENCOUNTER — PATIENT OUTREACH (OUTPATIENT)
Dept: PRIMARY CARE | Facility: CLINIC | Age: 74
End: 2024-06-04

## 2024-06-04 NOTE — PROGRESS NOTES
Call regarding appt. with PCP on (30 May 24) after hospitalization.  At time of outreach call the patient feels as if their condition has improved since last visit.  Reviewed the PCP appointment with the pt and addressed any questions or concerns.

## 2024-06-07 ENCOUNTER — HOSPITAL ENCOUNTER (OUTPATIENT)
Dept: RADIOLOGY | Facility: HOSPITAL | Age: 74
Discharge: HOME | End: 2024-06-07
Payer: MEDICARE

## 2024-06-07 ENCOUNTER — TELEMEDICINE (OUTPATIENT)
Dept: PHARMACY | Facility: HOSPITAL | Age: 74
End: 2024-06-07
Payer: COMMERCIAL

## 2024-06-07 DIAGNOSIS — C56.3: ICD-10-CM

## 2024-06-07 DIAGNOSIS — C57.01 CARCINOMA OF RIGHT FALLOPIAN TUBE (MULTI): ICD-10-CM

## 2024-06-07 DIAGNOSIS — D68.59 ISCHEMIC CEREBROVASCULAR ACCIDENT (CVA) DUE TO HYPERCOAGULABLE STATE (MULTI): ICD-10-CM

## 2024-06-07 DIAGNOSIS — E11.40 CONTROLLED TYPE 2 DIABETES MELLITUS WITH DIABETIC NEUROPATHY, WITHOUT LONG-TERM CURRENT USE OF INSULIN (MULTI): ICD-10-CM

## 2024-06-07 DIAGNOSIS — I63.9 ISCHEMIC CEREBROVASCULAR ACCIDENT (CVA) DUE TO HYPERCOAGULABLE STATE (MULTI): ICD-10-CM

## 2024-06-07 DIAGNOSIS — R53.1 GENERAL WEAKNESS: ICD-10-CM

## 2024-06-07 PROCEDURE — A9552 F18 FDG: HCPCS | Performed by: INTERNAL MEDICINE

## 2024-06-07 PROCEDURE — 3430000001 HC RX 343 DIAGNOSTIC RADIOPHARMACEUTICALS: Performed by: INTERNAL MEDICINE

## 2024-06-07 PROCEDURE — 78815 PET IMAGE W/CT SKULL-THIGH: CPT | Mod: PS

## 2024-06-07 RX ORDER — MULTIVIT-MIN/IRON FUM/FOLIC AC 7.5 MG-4
1 TABLET ORAL DAILY
COMMUNITY

## 2024-06-07 RX ORDER — FLUDEOXYGLUCOSE F 18 200 MCI/ML
11 INJECTION, SOLUTION INTRAVENOUS
Status: COMPLETED | OUTPATIENT
Start: 2024-06-07 | End: 2024-06-07

## 2024-06-07 RX ADMIN — FLUDEOXYGLUCOSE F 18 11 MILLICURIE: 200 INJECTION, SOLUTION INTRAVENOUS at 14:14

## 2024-06-07 NOTE — PROGRESS NOTES
Pharmacy Post-Discharge Visit    Antoine Romero is a 73 y.o. female was referred to Clinical Pharmacy Team to complete a post-discharge medication optimization and monitoring visit. The patient was referred for their Diabetes and Cerebrovascular Accident. Of note, patient's daughter and ALTHEA Moreno led the conversation on behalf of the patient at today's visit.      Admission Date: 5/13/2024  Discharge Date: 5/17/2024    Referring Provider: Cristino Guardado APRN-*  PCP: DAMIAN Townsend-CNP - last visit: 4/12/2024, next visit: 7/24/2024      Subjective   Allergies   Allergen Reactions    Sulfamethoxazole-Trimethoprim Rash, Itching and Hives    Metformin Diarrhea       [id number] - [place] [id number] [address] [address]  Phone: [phone] Fax: [phone]      Medication System Management:  Affordability/Accessibility: denies  Adherence/Organization: complete adherence      Social History     Social History Narrative    Not on file        Notable Medication changes following discharge:  Start:   Acetaminophen 325 mg 2 tablets every 6 hours as needed for fever  Loperamide 2 mg every 4 times daily as needed for diarrhea  Nitrofurantoin 100 mg every 12 hours  Ondansetron 4 mg ODT 1 tablet every 4 hours as needed for nausea  Stop: N/A  Change: N/A    Diabetes  She has type 2 diabetes mellitus. Her disease course has been improving. Risk factors for coronary artery disease include diabetes mellitus, hypertension and male sex. She is compliant with treatment all of the time.     Notes that BG was elevated while she was having a UTI, but BG has been improving since discharge. Additionally notes that they are monitoring her food intake to reduce dietary sugars.     Reports limited low BG symptom/events. Utilizing snacks throughout the day to avoid recurrent lows.     BG generally between  mg/dL.     ASCVD ASSESSMENT  [place] 7/2024  Current Regimen:  Anticoagulant?:  No  Antiplatelet?: Yes - aspirin 81 mg daily  Statin?: Yes - atorvastatin 80 mg daily     HTN history:  HTN diagnosis: yes  Current Regimen  Lisinopril 5 mg daily  Bisoprolol-hydrochlorothiazide 2.5-6.25 mg daily  BP Cuff at home? yes  HTN at goal? yes    Reported BG between 106-115/70s mmHg, O2 saturation being monitored at home as well.    HLD history:  Diagnosis? yes  At goal? yes  Current LDL: 44 24  Current T 24    DM history:  Diagnosis? yes  At goal? no  Current Regimen:  Farxiga 10 mg once daily  Glimepiride 4 mg twice daily  See bg trends above    Smoking history:  She has never smoked.    Objective     There were no vitals taken for this visit.   BP Readings from Last 4 Encounters:   24 100/60   24 107/72   24 115/78   24 122/70      There were no vitals filed for this visit.     LAB  Lab Results   Component Value Date    BILITOT 0.5 2024    CALCIUM 9.5 2024    CO2 24 2024     2024    CREATININE 0.95 2024    GLUCOSE 94 2024    ALKPHOS 45 2024    K 3.8 2024    PROT 6.8 2024     2024    AST 19 2024    ALT 10 2024    BUN 25 (H) 2024    ANIONGAP 14 2024    MG 1.56 (L) 2024    ALBUMIN 3.8 2024    LIPASE 8 (L) 2023    GFRF CANCELED 2023    GFRMALE CANCELED 2023     Lab Results   Component Value Date    TRIG 116 2024    CHOL 106 2024    LDLCALC 44 2024    HDL 38.5 2024     Lab Results   Component Value Date    HGBA1C 9.0 (H) 2024         Current Outpatient Medications   Medication Instructions    albuterol (Ventolin HFA) 90 mcg/actuation inhaler 2 puffs, inhalation, Every 6 hours PRN    aspirin 81 mg, oral, Daily    atorvastatin (LIPITOR) 80 mg, oral, Nightly    bisoproloL-hydrochlorothiazide (Ziac) 2.5-6.25 mg tablet 1 tablet, oral, Daily    blood sugar diagnostic (FreeStyle Lite Strips) strip 1 strip, subcutaneous, 2  times daily    cholecalciferol (Vitamin D3) 5,000 Units tablet 2 tablets, oral, Daily    dapagliflozin propanediol (FARXIGA) 10 mg, oral, Daily    FreeStyle glucose monitoring kit 1 each, miscellaneous, As needed    FreeStyle Lancets 28 gauge use to test blood sugar once daily    gabapentin (Neurontin) 300 mg capsule Take 1 capsule (300 mg) by mouth 2 times a day AND 2 capsules (600 mg) once daily at bedtime.    glimepiride (AMARYL) 4 mg, oral, 2 times daily    lancets misc Patient to test once daily    lisinopril 5 mg, oral, Daily    loperamide (IMODIUM) 2 mg, oral, 4 times daily PRN    meclizine (ANTIVERT) 25 mg, oral, Daily PRN    morphine CR (MS CONTIN) 60 mg, oral, 2 times daily, Do not crush, chew, or split.    multivitamin with minerals tablet 1 tablet, oral, Daily    omeprazole (PRILOSEC) 40 mg, oral, Daily, Do not crush or chew.    ondansetron ODT (ZOFRAN-ODT) 4 mg, oral, Every 4 hours PRN    oxyCODONE (ROXICODONE) 10 mg, oral, Every 6 hours PRN    sennosides (senna) 8.6 mg tablet 2 tablets, oral, Daily    tiZANidine (Zanaflex) 4 mg tablet TAKE TWO TABLETS BY MOUTH THREE TIMES A DAY AS NEEDED FOR MUSCLE SPASMS    walker (Ultra-Light Rollator) misc 1 each, miscellaneous, See admin instructions      DRUG INTERACTIONS  None requiring intervention at this time      Assessment/Plan   Problem List Items Addressed This Visit       Controlled type 2 diabetes mellitus with diabetic neuropathy, without long-term current use of insulin (Multi)    Ischemic cerebrovascular accident (CVA) due to hypercoagulable state (Multi)   DM/CVA   Continue medications as currently prescribed  Compliance at present is estimated to be excellent  Medication list reconciled with patient  Education Provided to Patient/Caregiver: at POA request, reviewed general information regarding  medications   Follow-up: as needed, no scheduled follow-up at this time  PCP Follow-Up: 24    Continue all meds under the continuation of care  with the referring provider and clinical pharmacy team.    Dajuan Khalil PharmD     Verbal consent to manage patient's drug therapy was obtained from an individual authorized to act on behalf of a patient. They were informed they may decline to participate or withdraw from participation in pharmacy services at any time.

## 2024-06-10 ENCOUNTER — LAB (OUTPATIENT)
Dept: LAB | Facility: LAB | Age: 74
End: 2024-06-10
Payer: COMMERCIAL

## 2024-06-10 DIAGNOSIS — C56.3: ICD-10-CM

## 2024-06-10 DIAGNOSIS — R53.1 GENERAL WEAKNESS: ICD-10-CM

## 2024-06-10 DIAGNOSIS — C57.01 CARCINOMA OF RIGHT FALLOPIAN TUBE (MULTI): ICD-10-CM

## 2024-06-10 LAB — CANCER AG125 SERPL-ACNC: 8.8 U/ML (ref 0–30.2)

## 2024-06-10 PROCEDURE — 86304 IMMUNOASSAY TUMOR CA 125: CPT

## 2024-06-12 ENCOUNTER — OFFICE VISIT (OUTPATIENT)
Dept: HEMATOLOGY/ONCOLOGY | Facility: CLINIC | Age: 74
End: 2024-06-12
Payer: MEDICARE

## 2024-06-12 VITALS
HEART RATE: 66 BPM | BODY MASS INDEX: 31.6 KG/M2 | RESPIRATION RATE: 16 BRPM | TEMPERATURE: 97.7 F | DIASTOLIC BLOOD PRESSURE: 67 MMHG | OXYGEN SATURATION: 93 % | WEIGHT: 233.03 LBS | SYSTOLIC BLOOD PRESSURE: 108 MMHG

## 2024-06-12 DIAGNOSIS — C56.3: ICD-10-CM

## 2024-06-12 DIAGNOSIS — C57.01 CARCINOMA OF RIGHT FALLOPIAN TUBE (MULTI): ICD-10-CM

## 2024-06-12 DIAGNOSIS — R53.1 GENERAL WEAKNESS: ICD-10-CM

## 2024-06-12 PROCEDURE — 3048F LDL-C <100 MG/DL: CPT | Performed by: INTERNAL MEDICINE

## 2024-06-12 PROCEDURE — 1111F DSCHRG MED/CURRENT MED MERGE: CPT | Performed by: INTERNAL MEDICINE

## 2024-06-12 PROCEDURE — 3078F DIAST BP <80 MM HG: CPT | Performed by: INTERNAL MEDICINE

## 2024-06-12 PROCEDURE — 99214 OFFICE O/P EST MOD 30 MIN: CPT | Performed by: INTERNAL MEDICINE

## 2024-06-12 PROCEDURE — 1157F ADVNC CARE PLAN IN RCRD: CPT | Performed by: INTERNAL MEDICINE

## 2024-06-12 PROCEDURE — 1159F MED LIST DOCD IN RCRD: CPT | Performed by: INTERNAL MEDICINE

## 2024-06-12 PROCEDURE — 3052F HG A1C>EQUAL 8.0%<EQUAL 9.0%: CPT | Performed by: INTERNAL MEDICINE

## 2024-06-12 PROCEDURE — 4010F ACE/ARB THERAPY RXD/TAKEN: CPT | Performed by: INTERNAL MEDICINE

## 2024-06-12 PROCEDURE — 3008F BODY MASS INDEX DOCD: CPT | Performed by: INTERNAL MEDICINE

## 2024-06-12 PROCEDURE — 3062F POS MACROALBUMINURIA REV: CPT | Performed by: INTERNAL MEDICINE

## 2024-06-12 PROCEDURE — 3074F SYST BP LT 130 MM HG: CPT | Performed by: INTERNAL MEDICINE

## 2024-06-12 PROCEDURE — 1125F AMNT PAIN NOTED PAIN PRSNT: CPT | Performed by: INTERNAL MEDICINE

## 2024-06-12 RX ORDER — MORPHINE SULFATE 60 MG/1
60 TABLET, FILM COATED, EXTENDED RELEASE ORAL 2 TIMES DAILY
Qty: 60 TABLET | Refills: 0 | Status: SHIPPED | OUTPATIENT
Start: 2024-06-12 | End: 2024-07-12

## 2024-06-12 ASSESSMENT — NCCN CANCER DISTRESS MANAGEMENT
NCCN PHYSICAL CONCERNS: 1
NCCN EMOTIONAL CONCERNS: 4

## 2024-06-12 ASSESSMENT — PAIN SCALES - GENERAL: PAINLEVEL: 5

## 2024-06-12 NOTE — PROGRESS NOTES
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   Presented with right lower quadrant pain in late December that was initially attributed to gallstones.  On further work-up by her surgeon, ultrasound of the pelvis showed bilateral ovarian mass lesions measuring 6.4 cm on the right and 4.3 cm in the left.   CA-125: 24.0.  CT chest showed small lesions in right lung measuring 0.4 cm in RLL and 0.5 cm in RML.  Mammogram was negative.   3/1/2021: BSO, omentectomy and biopsies.  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 pelvic mass.  Left ovary/tube, omentum: negative.  0/8 nodes positive.   Adj. chemo recommended.   4/5/2021: : 15.5  4/12/2021: Cycle #1/6 adjuvant chemotherapy with paclitaxel/carboplatin  5/3/21: #2 Carbo/Taxol  5/24/21: #3 Carbo/taxol  6/21/21: #4/6 carbo/Taxol.  7/13/2021: #5/6 carbo/Taxol.  8/3/2021: #6/6 carbo/Taxol  8/11/2021: CA-125: 8.5.  1/10/2022: : 9.2.   4/28/2022: : 40.9.  5/16/2022: CT abdomen pelvis: Increased adenopathy consistent with progressive disease.  6/22/2022: : 41.0.  7/15/2022: #1 carboplatin/gemcitabine.  8/12/2022: #2 carboplatin/gemcitabine. D8 chemo was held because of pancytopenia  9/1/2022: : 13.9.  9/9/22      C3D1 , no D8  9/30/22    C4, carboplatin and gemcitabine with dose reduction, no day 8 chemo  10/13/22 , CT of A/P ,  Cystic mass noted  along the right posterior liver has increased in size, however all of  the othermesenteric and pelvic metastatic implants appear to be  decreased. No new sites of metastatic disease.  10/21/22   C5D1  11/11/22  C6D1  2/11/23 , CT of CAP , CHEST  1.  Stablechronic changes without evidence of metastatic disease  within the chest.   ABDOMEN - PELVIS  1.  Status post hysterectomy and bilateral salpingo-oophorectomy,  with similar to mildly  decreased appearance of peritoneal and  mesenteric deposits as described above. No visualized new metastatic  deposits. Maximum decrease is seen in the cystic lesion adjacent to  the inferior margin of the liver parenchyma.  2023: : 11.1 (normal).  23 , CT of abdominal pelvis, peritoneal deposits slightly increased in size      2023, right chest wall subcutaneous nodule, needle biopsy negative for malignancy  23 , PET , 1. FDG avid soft tissue implants in the left aspect of the pelvis, similar in size to abdominopelvic CT dated 2023, and remain concerning for metastatic disease. 2. Newly developed FDG avid soft tissue in the right aspect of the  pelvis may reflect a metastatic lymph node versus metastatic implant.     24 , CT of abdominal pelvis, peritoneal metastasis slightly decreased in size and stable as compared to previous PET scan done in , PET scan, did show hypermetabolic peritoneal nodules at the inferior aspect of hepatic segment 6, increased hypermetabolic of internal iliac lymph node,L>R, questionable pelvic implants   ,  8.8    History of Present Illness:      ID Statement:    JERMAINE SOTO is a 73 year old Female        Chief Complaint: Ovarian cancer   Interval History:    24  Jermaine Soto returns today for follow-up and treatment of ovarian/fallopian tube cancer.  She complains of weakness and fatigue, no nausea, something patient lower  abdominal pain, patient is also complaining lower back pain.     was 8.8 and PET scan did show some metabolic activity in the peritoneal nodules and not too much progression was seen, discussed with the patient  Past medical history: Ovarian/fallopian tube cancer, stage IIA, s/p BSO, omentectomy as described above. Last chemotherapy was given on 2022.  CT scans stable/improved.   levels have been normal. .  History hypertension, diabetes, diabetic  neuropathy, low back pain, degenerative joint disease, tonsillectomy, vaginal hysterectomy 1973 for benign disease, Nissen fundoplication,  4 back surgeries, ankle/foot surgeries.  Had biopsy of benign left breast lesion.  No other history of malignancy, MI, CVA or VTE disease.      Family medical history: Mother, father and brother had colon cancer.  Brother had brain tumor.  No family history of breast, ovarian or other gynecologic  cancer.      Social history: Former smoker, quit 1973.  Remote history alcohol use, quit 1973.     Review of Systems:   Review of Systems:    Constitutional: No fever, chills, night sweats.  Some fatigue.  Head and neck: No headaches or dizziness.  No pain, stiffness.   HEENT: No sore throat, sinusitis.  Hearing is adequate. Vison is adequate.   Cardiac: No chest pain, palpitations, lightheadedness.   Left lateral chest wall pain, intermittent.  Respiratory: No increased dyspnea, cough, hemoptysis.   GI: Appetite is good, weight stable.  Occasional lower abdominal discomfort.  Some constipation, diarrhea, change in bowel movements, following treatment.  No melena, hematochezia, nausea, vomiting.  Genitourinary: No frequency, urgency.  No polyuria, dysuria, hematuria.   Musculoskeletal: Complains of mild arthralgias.    Endocrine: History diabetes, no thyroid disease.  Skin: No rash, skin lesions, itching.  Alopecia resolving.  Neuromuscular: No lightheadedness, dizziness.  No history of seizure.  Had some numbness, paresthesias in her feet, slowly improving.  No focal weakness, tremor.                    Allergies and Intolerances:       Allergies:         Bactrim: Drug, Itching (Severe), Rash (Severe), Active     Outpatient Medication Profile:  * Patient Currently Takes Medications as of 13-Sep-2023 12:39 documented in Structured Notes         atorvastatin 80 mg oral tablet : Last Dose Taken:  , 1 tab(s) orally once (at bedtime), Start Date: 05-Jul-2023         apixaban 5 mg oral  tablet: Last Dose Taken:  , 1 tab(s) orally every  12 hours, Start Date: 05-Jul-2023         morphine 30 mg/8 to 12 hr oral tablet, extended release: Last Dose Taken:   , 1 tab(s) orally every 8 hours , Start Date: 24-May-2023         meclizine 25 mg oral tablet: Last Dose Taken:  , 1 tab(s) orally 3 times  a day as needed for dizziness, Start Date: 16-Jan-2023         glimepiride 2 mg oral tablet: Last Dose Taken:  , 1 tab(s) orally once  a day         Allergy Relief 10 mg oral tablet: Last Dose Taken:  , 1 tab(s) orally  once a day         Zinc: Last Dose Taken:  , 50 mg oral once a day         tiZANidine 4 mg oral tablet: Last Dose Taken:  , 2 tab(s) oral prn         Aspir 81 oral delayed release tablet: Last Dose Taken:  , 1 tab(s) orally  once a day         bisoprolol-hydrochlorothiazide 5 mg-6.25 mg oral tablet: Last Dose Taken:   , 1 tab(s) orally once a day         Vitamin C 100 mg oral tablet: Last Dose Taken:  , 2 tab(s) orally 3 times  a day         Vitamin D3 125 mcg (5000 intl units) oral tablet: Last Dose Taken:  ,  1 tab(s) orally 2 times a day         magnesium carbonate 250 mg oral capsule: Last Dose Taken:           lisinopril 10 mg oral tablet: Last Dose Taken:  , 1 cap(s) oral once  a day         omeprazole 40 mg oral delayed release capsule: Last Dose Taken:  , 1  cap(s) oral once a day         senna 8.6 mg oral tablet: Last Dose Taken:  , 1 cap(s) oral once a day         gabapentin 300 mg oral capsule: Last Dose Taken:  , 1 cap(s) oral 3 times  a day         albuterol 2.5 mg/3 mL (0.083%) inhalation solution: Last Dose Taken:   , 1 INHL inhalation once a day             Medical History:         Encounter for antineoplastic chemotherapy: ICD-10: Z51.11,  Status: Active     Family History: No Family History items are recorded  in the problem list.      Social History:   Social Substance History:  ·  Smoking Status former smoker (1)            Vitals and Measurements:   Vitals: Temp: 36.3  HR: 75   RR: 16  BP: 106/72  SPO2%:   94   Measurements: HT(cm): 178.2  WT(kg): 102.3  BSA:  2.25  BMI:  32.2   Last 3 Weights & Heights: Date:                           Weight/Scale Type:                    Height:   13-Sep-2023 12:27                102.3  kg                     178.2  cm  28-Jun-2023 08:59                104.7  kg                     178.2  cm  25-May-2023 13:19                108  kg                     178.2  cm      Physical Exam:      Constitutional: Well developed, awake/alert/oriented  x3.   Eyes: PERRL, EOMI, clear sclera.   ENMT: No external lesions.   Head/Neck: Neck with normal movement.  No mass, adenopathy  or tenderness.  No JVD. Trachea midline.   Respiratory/Thorax: Thorax symmetric. Clear to auscultation.   No wheezes, rales, rhonchi.  Mild tenderness palpation left lower anterolateral chest wall/ribs, but no mass lesion, skin lesions.   Cardiovascular: Regular, rate and rhythm. No murmur.   No rubs or gallops. Normal S1, S2.   Gastrointestinal: Nondistended.  Healed abdominal  incision.  No masses palpable.  No palpable hepatomegaly, splenomegaly.   Lower abdominal pain on palpation   Musculoskeletal: Mild joint deformities.   Extremities: Unremarkable extremities; no cyanosis,   No edema.   Neurological: Alert and oriented x3.   Lymphatic: No palpably significant lymphadenopathy  in the neck, supraclavicular areas.   Psychological: Appropriate mood and behavior.   Skin: Warm and dry, no rashes, no suspicious lesions.         Lab Results:     Result Notes            Component  Ref Range & Units 2 d ago  (6/10/24) 2 mo ago  (3/15/24) 6 mo ago  (12/15/23) 9 mo ago  (9/13/23) 9 mo ago  (9/8/23) 1 yr ago  (5/19/23) 1 yr ago  (2/10/23)   Cancer   0.0 - 30.2 U/mL 8.8 10.4 20.6 CANCELED R, CM 10.8 CM          ·  Results         PET scan , 6/7/24  Hypermetabolic peritoneal nodule abutting hepatic segment 8 and  hypermetabolic nodule at the inferior aspect of hepatic segment 6  consistent  with metastatic disease.  2. Increased hypermetabolic left greater than right internal iliac  metastatic lymphadenopathy versus pelvic implants.  3. Questionable hypermetabolic soft tissue nodule at the right aspect  of a right lower quadrant bowel containing hernia also concerning for  metastatic disease. Attention on follow-up imaging is recommended.  4. Indeterminate mildly hypermetabolic focus in the right hamstring  musculature without a definite underlying CT abnormality may be  physiologic, however a metastatic implant can not be definitely  excluded.             Assessment and Plan:      Assessment and Plan:   Assessment:    1.  High-grade serous carcinoma right ovary/fallopian tube/, status postpelvic mass BSO, omentectomy, pelvic washings, FIGO stage IIa (pT2a pN0 cM0).  Status post  6 cycles adjuvant chemotherapy with paclitaxel/carboplatin.  Had significant musculoskeletal pain with Taxol infusions.  Ca-125 level elevated at 40.9 (previously normal).  CT shows evidence of progression.  Symptomatic. s/p 4 cycles carboplatin/gemcitabine  October 13, 2022 CAT scan abdominal pelvis did show omental mass with decrease in size, patient is receiving only grade 1 chemotherapy including carboplatin and gemcitabine because of pancytopenia  11/11/22: Last dose of carboplatin and gemcitabine  2/11/23: CT of CAP: Chest- stable disease, no evidence of progression of disease, peritoneal nodule measuring 3 cm which is stable.  5/19/2023: : 11.1.  June 6, 2023, CT of  abdominal pelvis, peritoneal deposit slightly increased in size     9/09/23 , PET , 1. FDG avid soft tissue implants in the left aspect of the pelvis, similar in size to abdominopelvic CT dated 05/30/2023, and remain concerning for metastatic disease. 2. Newly developed FDG avid soft tissue in the right aspect of the  pelvis may reflect a metastatic lymph node versus metastatic implant.   1/4/24 , CT of A/P , stable   6/7/24 , PET scan stable, still  have persistent disease  2.  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      3.  Multiple medical problems.      4.  Abdominal pain, likely secondary to malignancy versus benign disease.  Controlled with MS Contin/oxycodone.     5.  History small right lung nodules of uncertain significance.     6.  Left lateral chest wall pain, intermittent, likely benign, rule out malignancy..     Plan: 6/12/24  No change in clinically status, patient still has lower abdominal pain and lower back pain.   has been stable.  PET scan result discussed with the patient, stable still have persistent disease  Monitor clinically no chemotherapy at this time, follow-up 3 months, no change in pain management  Greater than 30 minutes time spent with patient reviewing diagnostic studies, discussing diagnosis and treatment plan and documenting in EMR.

## 2024-06-12 NOTE — PATIENT INSTRUCTIONS
Follow up today for history of recurrent ovarian cancer.     Return in 3 months for routine follow up.

## 2024-06-17 ENCOUNTER — PATIENT OUTREACH (OUTPATIENT)
Dept: PRIMARY CARE | Facility: CLINIC | Age: 74
End: 2024-06-17
Payer: COMMERCIAL

## 2024-06-18 ENCOUNTER — TELEPHONE (OUTPATIENT)
Dept: HEMATOLOGY/ONCOLOGY | Facility: CLINIC | Age: 74
End: 2024-06-18
Payer: COMMERCIAL

## 2024-06-18 DIAGNOSIS — G89.4 CHRONIC PAIN SYNDROME: ICD-10-CM

## 2024-06-18 DIAGNOSIS — C57.01 CARCINOMA OF RIGHT FALLOPIAN TUBE (MULTI): ICD-10-CM

## 2024-06-18 RX ORDER — OXYCODONE HYDROCHLORIDE 10 MG/1
10 TABLET ORAL EVERY 6 HOURS PRN
Qty: 120 TABLET | Refills: 0 | Status: SHIPPED | OUTPATIENT
Start: 2024-06-18

## 2024-06-18 NOTE — TELEPHONE ENCOUNTER
[facility] report obtained, last refill verified, information forward to provider   Oxycodone last refilled on 5/7

## 2024-07-08 ENCOUNTER — TELEPHONE (OUTPATIENT)
Dept: HEMATOLOGY/ONCOLOGY | Facility: CLINIC | Age: 74
End: 2024-07-08
Payer: COMMERCIAL

## 2024-07-08 DIAGNOSIS — C57.01 CARCINOMA OF RIGHT FALLOPIAN TUBE (MULTI): ICD-10-CM

## 2024-07-08 DIAGNOSIS — G89.4 CHRONIC PAIN SYNDROME: ICD-10-CM

## 2024-07-08 RX ORDER — OXYCODONE HYDROCHLORIDE 10 MG/1
10 TABLET ORAL EVERY 6 HOURS PRN
Qty: 120 TABLET | Refills: 0 | Status: SHIPPED | OUTPATIENT
Start: 2024-07-08

## 2024-07-08 NOTE — TELEPHONE ENCOUNTER
[facility] report obtained, last refill verified, information forward to provider   Oxycodone last refilled on 6/18 for 120 tablets  Morphine ER last refilled on 6/12  Phone call to Antoine to verify that she was requesting a refill of Oxycodone. No answer, left a msg for return call  SPOKE with Tiffanie who stated Antoine needs a refill of MORPHINE not oxycodone  7/8, Sent to dr. Weiss  7/11, resent to dr. weiss

## 2024-07-11 ENCOUNTER — OFFICE VISIT (OUTPATIENT)
Dept: PRIMARY CARE | Facility: CLINIC | Age: 74
End: 2024-07-11
Payer: MEDICARE

## 2024-07-11 VITALS
DIASTOLIC BLOOD PRESSURE: 74 MMHG | SYSTOLIC BLOOD PRESSURE: 120 MMHG | WEIGHT: 232 LBS | RESPIRATION RATE: 16 BRPM | HEIGHT: 72 IN | HEART RATE: 68 BPM | OXYGEN SATURATION: 98 % | BODY MASS INDEX: 31.42 KG/M2

## 2024-07-11 DIAGNOSIS — D68.59 ISCHEMIC CEREBROVASCULAR ACCIDENT (CVA) DUE TO HYPERCOAGULABLE STATE (MULTI): ICD-10-CM

## 2024-07-11 DIAGNOSIS — G62.0 NEUROPATHY DUE TO CHEMOTHERAPEUTIC DRUG (MULTI): ICD-10-CM

## 2024-07-11 DIAGNOSIS — Z00.00 MEDICARE ANNUAL WELLNESS VISIT, SUBSEQUENT: ICD-10-CM

## 2024-07-11 DIAGNOSIS — D64.9 ANEMIA, UNSPECIFIED TYPE: ICD-10-CM

## 2024-07-11 DIAGNOSIS — E55.9 VITAMIN D DEFICIENCY: ICD-10-CM

## 2024-07-11 DIAGNOSIS — I63.9 ISCHEMIC CEREBROVASCULAR ACCIDENT (CVA) DUE TO HYPERCOAGULABLE STATE (MULTI): ICD-10-CM

## 2024-07-11 DIAGNOSIS — E11.40 CONTROLLED TYPE 2 DIABETES MELLITUS WITH DIABETIC NEUROPATHY, WITHOUT LONG-TERM CURRENT USE OF INSULIN (MULTI): ICD-10-CM

## 2024-07-11 DIAGNOSIS — T45.1X5A NEUROPATHY DUE TO CHEMOTHERAPEUTIC DRUG (MULTI): ICD-10-CM

## 2024-07-11 DIAGNOSIS — E78.5 HYPERLIPIDEMIA ASSOCIATED WITH TYPE 2 DIABETES MELLITUS (MULTI): ICD-10-CM

## 2024-07-11 DIAGNOSIS — I71.21 ANEURYSM OF THE ASCENDING AORTA, WITHOUT RUPTURE (CMS-HCC): ICD-10-CM

## 2024-07-11 DIAGNOSIS — E11.65 UNCONTROLLED TYPE 2 DIABETES MELLITUS WITH HYPERGLYCEMIA (MULTI): Primary | ICD-10-CM

## 2024-07-11 DIAGNOSIS — E11.69 HYPERLIPIDEMIA ASSOCIATED WITH TYPE 2 DIABETES MELLITUS (MULTI): ICD-10-CM

## 2024-07-11 DIAGNOSIS — R30.0 BURNING WITH URINATION: Primary | ICD-10-CM

## 2024-07-11 PROBLEM — Q21.12 PATENT FORAMEN OVALE (HHS-HCC): Status: ACTIVE | Noted: 2023-07-26

## 2024-07-11 PROBLEM — H61.20 IMPACTED CERUMEN: Status: ACTIVE | Noted: 2024-07-11

## 2024-07-11 PROBLEM — R29.810 FACIAL WEAKNESS: Status: ACTIVE | Noted: 2023-07-05

## 2024-07-11 PROBLEM — M54.50 LOW BACK PAIN, UNSPECIFIED: Status: ACTIVE | Noted: 2023-01-16

## 2024-07-11 PROBLEM — E66.9 OBESITY DUE TO ENERGY IMBALANCE: Status: ACTIVE | Noted: 2024-07-11

## 2024-07-11 PROBLEM — B37.0 CANDIDIASIS OF MOUTH: Status: ACTIVE | Noted: 2024-07-11

## 2024-07-11 PROBLEM — Z86.39 HISTORY OF DIABETES MELLITUS: Status: ACTIVE | Noted: 2024-07-11

## 2024-07-11 PROBLEM — Z86.79 HISTORY OF HYPERTENSION: Status: ACTIVE | Noted: 2024-07-11

## 2024-07-11 PROBLEM — K21.9 GASTROESOPHAGEAL REFLUX DISEASE: Status: ACTIVE | Noted: 2023-07-05

## 2024-07-11 PROBLEM — R47.02 EXPRESSIVE DYSPHASIA: Status: ACTIVE | Noted: 2024-07-11

## 2024-07-11 PROBLEM — E11.9 TYPE 2 DIABETES MELLITUS (MULTI): Status: ACTIVE | Noted: 2023-07-05

## 2024-07-11 PROBLEM — N39.0 URINARY TRACT INFECTION: Status: ACTIVE | Noted: 2023-06-02

## 2024-07-11 PROBLEM — Z86.39 HISTORY OF ELEVATED LIPIDS: Status: ACTIVE | Noted: 2024-07-11

## 2024-07-11 PROBLEM — C57.00: Status: ACTIVE | Noted: 2024-07-11

## 2024-07-11 PROBLEM — M25.569 KNEE PAIN: Status: ACTIVE | Noted: 2024-07-11

## 2024-07-11 PROBLEM — I10 HYPERTENSION: Status: ACTIVE | Noted: 2023-07-05

## 2024-07-11 PROBLEM — E66.9 OBESITY WITH BODY MASS INDEX 30 OR GREATER: Status: ACTIVE | Noted: 2024-07-11

## 2024-07-11 PROBLEM — Z20.822 CONTACT WITH AND (SUSPECTED) EXPOSURE TO COVID-19: Status: ACTIVE | Noted: 2023-01-16

## 2024-07-11 PROBLEM — R10.9 RIGHT FLANK PAIN: Status: ACTIVE | Noted: 2022-10-12

## 2024-07-11 PROBLEM — G62.9 NEUROPATHY: Status: ACTIVE | Noted: 2024-07-11

## 2024-07-11 PROBLEM — C56.9 MALIGNANT NEOPLASM OF OVARY (MULTI): Status: ACTIVE | Noted: 2022-10-18

## 2024-07-11 PROBLEM — I25.10 ATHEROSCLEROSIS OF CORONARY ARTERY: Status: ACTIVE | Noted: 2023-06-20

## 2024-07-11 PROBLEM — M75.40 IMPINGEMENT SYNDROME OF SHOULDER REGION: Status: ACTIVE | Noted: 2018-06-11

## 2024-07-11 PROBLEM — Z66 DO NOT RESUSCITATE: Status: ACTIVE | Noted: 2023-06-02

## 2024-07-11 PROBLEM — R22.2 MASS OF CHEST WALL: Status: ACTIVE | Noted: 2024-07-11

## 2024-07-11 PROCEDURE — 4010F ACE/ARB THERAPY RXD/TAKEN: CPT | Performed by: NURSE PRACTITIONER

## 2024-07-11 PROCEDURE — 3074F SYST BP LT 130 MM HG: CPT | Performed by: NURSE PRACTITIONER

## 2024-07-11 PROCEDURE — 3048F LDL-C <100 MG/DL: CPT | Performed by: NURSE PRACTITIONER

## 2024-07-11 PROCEDURE — 1160F RVW MEDS BY RX/DR IN RCRD: CPT | Performed by: NURSE PRACTITIONER

## 2024-07-11 PROCEDURE — 3078F DIAST BP <80 MM HG: CPT | Performed by: NURSE PRACTITIONER

## 2024-07-11 PROCEDURE — 3052F HG A1C>EQUAL 8.0%<EQUAL 9.0%: CPT | Performed by: NURSE PRACTITIONER

## 2024-07-11 PROCEDURE — 1159F MED LIST DOCD IN RCRD: CPT | Performed by: NURSE PRACTITIONER

## 2024-07-11 PROCEDURE — 3062F POS MACROALBUMINURIA REV: CPT | Performed by: NURSE PRACTITIONER

## 2024-07-11 PROCEDURE — 99214 OFFICE O/P EST MOD 30 MIN: CPT | Performed by: NURSE PRACTITIONER

## 2024-07-11 PROCEDURE — 1157F ADVNC CARE PLAN IN RCRD: CPT | Performed by: NURSE PRACTITIONER

## 2024-07-11 RX ORDER — MORPHINE SULFATE 60 MG/1
60 TABLET, FILM COATED, EXTENDED RELEASE ORAL 2 TIMES DAILY
Qty: 60 TABLET | Refills: 0 | Status: SHIPPED | OUTPATIENT
Start: 2024-07-11 | End: 2024-08-10

## 2024-07-11 ASSESSMENT — ANXIETY QUESTIONNAIRES
6. BECOMING EASILY ANNOYED OR IRRITABLE: NOT AT ALL
5. BEING SO RESTLESS THAT IT IS HARD TO SIT STILL: NOT AT ALL
IF YOU CHECKED OFF ANY PROBLEMS ON THIS QUESTIONNAIRE, HOW DIFFICULT HAVE THESE PROBLEMS MADE IT FOR YOU TO DO YOUR WORK, TAKE CARE OF THINGS AT HOME, OR GET ALONG WITH OTHER PEOPLE: NOT DIFFICULT AT ALL
1. FEELING NERVOUS, ANXIOUS, OR ON EDGE: NOT AT ALL
GAD7 TOTAL SCORE: 0
3. WORRYING TOO MUCH ABOUT DIFFERENT THINGS: NOT AT ALL
2. NOT BEING ABLE TO STOP OR CONTROL WORRYING: NOT AT ALL
4. TROUBLE RELAXING: NOT AT ALL
7. FEELING AFRAID AS IF SOMETHING AWFUL MIGHT HAPPEN: NOT AT ALL

## 2024-07-11 ASSESSMENT — ENCOUNTER SYMPTOMS
OCCASIONAL FEELINGS OF UNSTEADINESS: 0
LOSS OF SENSATION IN FEET: 0
DEPRESSION: 0

## 2024-07-11 NOTE — PROGRESS NOTES
Subjective   Patient ID: Antoine Romero is a 73 y.o. female who presents for possible uti.    Patient is accompanied by her daughter following up for management of multiple chronic diseases.  She presents with complaint of possible UTI.  Reports that about 3 days ago she noticed some blood in the urine.  She denies dysuria or any other urinary symptoms.         Review of Systems   Genitourinary:  Positive for hematuria.   All other systems reviewed and are negative.      Objective   /74   Pulse 68   Resp 16   Ht 1.829 m (6')   Wt 105 kg (232 lb)   SpO2 98%   BMI 31.46 kg/m²     Physical Exam  Vitals reviewed.   Constitutional:       Appearance: Normal appearance. She is obese.   HENT:      Head: Normocephalic and atraumatic.      Right Ear: External ear normal.      Left Ear: External ear normal.      Nose: Nose normal.      Mouth/Throat:      Mouth: Mucous membranes are moist.   Cardiovascular:      Rate and Rhythm: Normal rate.   Pulmonary:      Effort: Pulmonary effort is normal.   Abdominal:      General: Bowel sounds are normal.      Palpations: Abdomen is soft.   Musculoskeletal:      Cervical back: Neck supple.   Skin:     General: Skin is warm and dry.   Neurological:      Mental Status: She is alert and oriented to person, place, and time.   Psychiatric:         Mood and Affect: Mood normal.         Behavior: Behavior normal.         Thought Content: Thought content normal.         Judgment: Judgment normal.       In office urinalysis is negative besides some glucose in the urine.  Assessment/Plan   Problem List Items Addressed This Visit    None  Visit Diagnoses       Burning with urination    -  Primary

## 2024-07-11 NOTE — PATIENT INSTRUCTIONS
Your office urine test is unremarkable besides some glucose in your urine which is because of your diabetes.  I recommend adequate water intake and complete labs a week prior to follow-up in 6 months for annual Medicare wellness exam.

## 2024-07-15 PROBLEM — E66.01 SEVERE OBESITY (MULTI): Status: RESOLVED | Noted: 2024-05-30 | Resolved: 2024-07-15

## 2024-07-15 PROBLEM — D64.9 ANEMIA: Status: ACTIVE | Noted: 2024-07-15

## 2024-07-15 PROBLEM — R30.0 BURNING WITH URINATION: Status: ACTIVE | Noted: 2024-07-15

## 2024-07-15 ASSESSMENT — ENCOUNTER SYMPTOMS: HEMATURIA: 1

## 2024-07-24 ENCOUNTER — APPOINTMENT (OUTPATIENT)
Dept: PRIMARY CARE | Facility: CLINIC | Age: 74
End: 2024-07-24
Payer: MEDICARE

## 2024-08-05 ENCOUNTER — TELEPHONE (OUTPATIENT)
Dept: NEUROLOGY | Facility: HOSPITAL | Age: 74
End: 2024-08-05
Payer: COMMERCIAL

## 2024-08-05 DIAGNOSIS — D68.59 HYPERCOAGULABLE STATE (MULTI): Primary | ICD-10-CM

## 2024-08-05 NOTE — TELEPHONE ENCOUNTER
Pt's daughter called in with regards to her Eliquis.  Tried to get a refill from [facility] in [place] but was told the order was cancelled.      I can't see anywhere that it was discontinued but maybe I missed that.  Please clarify.    Pt hasn't had this med for 3 days.  Thanks you.

## 2024-08-12 ENCOUNTER — TELEPHONE (OUTPATIENT)
Dept: HEMATOLOGY/ONCOLOGY | Facility: CLINIC | Age: 74
End: 2024-08-12
Payer: COMMERCIAL

## 2024-08-12 DIAGNOSIS — C57.01 CARCINOMA OF RIGHT FALLOPIAN TUBE (MULTI): ICD-10-CM

## 2024-08-12 RX ORDER — MORPHINE SULFATE 60 MG/1
60 TABLET, FILM COATED, EXTENDED RELEASE ORAL 2 TIMES DAILY
Qty: 60 TABLET | Refills: 0 | Status: SHIPPED | OUTPATIENT
Start: 2024-08-12 | End: 2024-09-11

## 2024-08-12 NOTE — TELEPHONE ENCOUNTER
[facility] report obtained. Verified last refill date and quantity. Information forward to provider.   Last filled 7/11/2024 for 30 day supply

## 2024-08-16 ENCOUNTER — PATIENT OUTREACH (OUTPATIENT)
Dept: PRIMARY CARE | Facility: CLINIC | Age: 74
End: 2024-08-16
Payer: COMMERCIAL

## 2024-08-22 ENCOUNTER — TELEPHONE (OUTPATIENT)
Dept: HEMATOLOGY/ONCOLOGY | Facility: CLINIC | Age: 74
End: 2024-08-22
Payer: COMMERCIAL

## 2024-08-22 DIAGNOSIS — C57.01 CARCINOMA OF RIGHT FALLOPIAN TUBE (MULTI): ICD-10-CM

## 2024-08-22 DIAGNOSIS — G89.4 CHRONIC PAIN SYNDROME: ICD-10-CM

## 2024-08-22 RX ORDER — OXYCODONE HYDROCHLORIDE 10 MG/1
10 TABLET ORAL EVERY 6 HOURS PRN
Qty: 120 TABLET | Refills: 0 | Status: SHIPPED | OUTPATIENT
Start: 2024-08-22

## 2024-08-22 NOTE — TELEPHONE ENCOUNTER
[facility] report obtained. Verified last refill date and quantity. Information forward to provider.   Last filled 7/16/2024 for 30 day supply.

## 2024-09-06 ENCOUNTER — TELEPHONE (OUTPATIENT)
Dept: HEMATOLOGY/ONCOLOGY | Facility: CLINIC | Age: 74
End: 2024-09-06
Payer: COMMERCIAL

## 2024-09-06 NOTE — TELEPHONE ENCOUNTER
[facility] report obtained. Verified last refill date and quantity. Information forward to provider.   Last filled 8/12/2024 for 30 day supply

## 2024-09-10 DIAGNOSIS — C57.01 CARCINOMA OF RIGHT FALLOPIAN TUBE (MULTI): ICD-10-CM

## 2024-09-10 RX ORDER — MORPHINE SULFATE 60 MG/1
60 TABLET, FILM COATED, EXTENDED RELEASE ORAL 2 TIMES DAILY
Qty: 60 TABLET | Refills: 0 | Status: SHIPPED | OUTPATIENT
Start: 2024-09-10 | End: 2024-10-10

## 2024-09-12 ENCOUNTER — OFFICE VISIT (OUTPATIENT)
Dept: HEMATOLOGY/ONCOLOGY | Facility: CLINIC | Age: 74
End: 2024-09-12
Payer: MEDICARE

## 2024-09-12 VITALS
SYSTOLIC BLOOD PRESSURE: 122 MMHG | HEART RATE: 60 BPM | BODY MASS INDEX: 31.68 KG/M2 | WEIGHT: 233.91 LBS | DIASTOLIC BLOOD PRESSURE: 69 MMHG | OXYGEN SATURATION: 95 % | RESPIRATION RATE: 16 BRPM | TEMPERATURE: 97.5 F | HEIGHT: 72 IN

## 2024-09-12 DIAGNOSIS — C56.3: ICD-10-CM

## 2024-09-12 DIAGNOSIS — R53.1 GENERAL WEAKNESS: ICD-10-CM

## 2024-09-12 DIAGNOSIS — C57.01 CARCINOMA OF RIGHT FALLOPIAN TUBE (MULTI): ICD-10-CM

## 2024-09-12 LAB
ALBUMIN SERPL BCP-MCNC: 4 G/DL (ref 3.4–5)
ALP SERPL-CCNC: 60 U/L (ref 33–136)
ALT SERPL W P-5'-P-CCNC: 10 U/L (ref 7–45)
ANION GAP SERPL CALC-SCNC: 9 MMOL/L (ref 10–20)
AST SERPL W P-5'-P-CCNC: 16 U/L (ref 9–39)
BASOPHILS # BLD AUTO: 0.02 X10*3/UL (ref 0–0.1)
BASOPHILS NFR BLD AUTO: 0.4 %
BILIRUB SERPL-MCNC: 0.4 MG/DL (ref 0–1.2)
BUN SERPL-MCNC: 20 MG/DL (ref 6–23)
CALCIUM SERPL-MCNC: 9 MG/DL (ref 8.6–10.3)
CANCER AG125 SERPL-ACNC: 10.9 U/ML (ref 0–30.2)
CHLORIDE SERPL-SCNC: 101 MMOL/L (ref 98–107)
CO2 SERPL-SCNC: 30 MMOL/L (ref 21–32)
CREAT SERPL-MCNC: 1.01 MG/DL (ref 0.5–1.05)
EGFRCR SERPLBLD CKD-EPI 2021: 59 ML/MIN/1.73M*2
EOSINOPHIL # BLD AUTO: 0.08 X10*3/UL (ref 0–0.4)
EOSINOPHIL NFR BLD AUTO: 1.6 %
ERYTHROCYTE [DISTWIDTH] IN BLOOD BY AUTOMATED COUNT: 16.1 % (ref 11.5–14.5)
GLUCOSE SERPL-MCNC: 218 MG/DL (ref 74–99)
HCT VFR BLD AUTO: 34.5 % (ref 36–46)
HGB BLD-MCNC: 10.1 G/DL (ref 12–16)
IMM GRANULOCYTES # BLD AUTO: 0 X10*3/UL (ref 0–0.5)
IMM GRANULOCYTES NFR BLD AUTO: 0 % (ref 0–0.9)
LYMPHOCYTES # BLD AUTO: 1.86 X10*3/UL (ref 0.8–3)
LYMPHOCYTES NFR BLD AUTO: 37 %
MCH RBC QN AUTO: 24 PG (ref 26–34)
MCHC RBC AUTO-ENTMCNC: 29.3 G/DL (ref 32–36)
MCV RBC AUTO: 82 FL (ref 80–100)
MONOCYTES # BLD AUTO: 0.36 X10*3/UL (ref 0.05–0.8)
MONOCYTES NFR BLD AUTO: 7.2 %
NEUTROPHILS # BLD AUTO: 2.71 X10*3/UL (ref 1.6–5.5)
NEUTROPHILS NFR BLD AUTO: 53.8 %
PLATELET # BLD AUTO: 156 X10*3/UL (ref 150–450)
POTASSIUM SERPL-SCNC: 4.3 MMOL/L (ref 3.5–5.3)
PROT SERPL-MCNC: 6.9 G/DL (ref 6.4–8.2)
RBC # BLD AUTO: 4.2 X10*6/UL (ref 4–5.2)
SODIUM SERPL-SCNC: 136 MMOL/L (ref 136–145)
WBC # BLD AUTO: 5 X10*3/UL (ref 4.4–11.3)

## 2024-09-12 PROCEDURE — 1159F MED LIST DOCD IN RCRD: CPT | Performed by: INTERNAL MEDICINE

## 2024-09-12 PROCEDURE — 99214 OFFICE O/P EST MOD 30 MIN: CPT | Performed by: INTERNAL MEDICINE

## 2024-09-12 PROCEDURE — 3074F SYST BP LT 130 MM HG: CPT | Performed by: INTERNAL MEDICINE

## 2024-09-12 PROCEDURE — 1157F ADVNC CARE PLAN IN RCRD: CPT | Performed by: INTERNAL MEDICINE

## 2024-09-12 PROCEDURE — 3078F DIAST BP <80 MM HG: CPT | Performed by: INTERNAL MEDICINE

## 2024-09-12 PROCEDURE — 3062F POS MACROALBUMINURIA REV: CPT | Performed by: INTERNAL MEDICINE

## 2024-09-12 PROCEDURE — 1160F RVW MEDS BY RX/DR IN RCRD: CPT | Performed by: INTERNAL MEDICINE

## 2024-09-12 PROCEDURE — 1125F AMNT PAIN NOTED PAIN PRSNT: CPT | Performed by: INTERNAL MEDICINE

## 2024-09-12 PROCEDURE — 4010F ACE/ARB THERAPY RXD/TAKEN: CPT | Performed by: INTERNAL MEDICINE

## 2024-09-12 PROCEDURE — 3052F HG A1C>EQUAL 8.0%<EQUAL 9.0%: CPT | Performed by: INTERNAL MEDICINE

## 2024-09-12 PROCEDURE — 36415 COLL VENOUS BLD VENIPUNCTURE: CPT | Performed by: INTERNAL MEDICINE

## 2024-09-12 PROCEDURE — 3008F BODY MASS INDEX DOCD: CPT | Performed by: INTERNAL MEDICINE

## 2024-09-12 PROCEDURE — 3048F LDL-C <100 MG/DL: CPT | Performed by: INTERNAL MEDICINE

## 2024-09-12 ASSESSMENT — NCCN CANCER DISTRESS MANAGEMENT
NCCN PRACTICAL CONCERNS: 11
NCCN PHYSICAL CONCERNS: 1

## 2024-09-12 ASSESSMENT — PAIN SCALES - GENERAL: PAINLEVEL: 7

## 2024-09-12 NOTE — PROGRESS NOTES
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   Presented with right lower quadrant pain in late December that was initially attributed to gallstones.  On further work-up by her surgeon, ultrasound of the pelvis showed bilateral ovarian mass lesions measuring 6.4 cm on the right and 4.3 cm in the left.   CA-125: 24.0.  CT chest showed small lesions in right lung measuring 0.4 cm in RLL and 0.5 cm in RML.  Mammogram was negative.   3/1/2021: BSO, omentectomy and biopsies.  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 pelvic mass.  Left ovary/tube, omentum: negative.  0/8 nodes positive.   Adj. chemo recommended.   4/5/2021: : 15.5  4/12/2021: Cycle #1/6 adjuvant chemotherapy with paclitaxel/carboplatin  5/3/21: #2 Carbo/Taxol  5/24/21: #3 Carbo/taxol  6/21/21: #4/6 carbo/Taxol.  7/13/2021: #5/6 carbo/Taxol.  8/3/2021: #6/6 carbo/Taxol  8/11/2021: CA-125: 8.5.  1/10/2022: : 9.2.   4/28/2022: : 40.9.  5/16/2022: CT abdomen pelvis: Increased adenopathy consistent with progressive disease.  6/22/2022: : 41.0.  7/15/2022: #1 carboplatin/gemcitabine.  8/12/2022: #2 carboplatin/gemcitabine. D8 chemo was held because of pancytopenia  9/1/2022: : 13.9.  9/9/22      C3D1 , no D8  9/30/22    C4, carboplatin and gemcitabine with dose reduction, no day 8 chemo  10/13/22 , CT of A/P ,  Cystic mass noted  along the right posterior liver has increased in size, however all of  the othermesenteric and pelvic metastatic implants appear to be  decreased. No new sites of metastatic disease.  10/21/22   C5D1  11/11/22  C6D1  2/11/23 , CT of CAP , CHEST  1.  Stablechronic changes without evidence of metastatic disease  within the chest.   ABDOMEN - PELVIS  1.  Status post hysterectomy and bilateral salpingo-oophorectomy,  with similar to mildly  decreased appearance of peritoneal and  mesenteric deposits as described above. No visualized new metastatic  deposits. Maximum decrease is seen in the cystic lesion adjacent to  the inferior margin of the liver parenchyma.  2023: : 11.1 (normal).  23 , CT of abdominal pelvis, peritoneal deposits slightly increased in size      2023, right chest wall subcutaneous nodule, needle biopsy negative for malignancy  23 , PET , 1. FDG avid soft tissue implants in the left aspect of the pelvis, similar in size to abdominopelvic CT dated 2023, and remain concerning for metastatic disease. 2. Newly developed FDG avid soft tissue in the right aspect of the  pelvis may reflect a metastatic lymph node versus metastatic implant.     24 , CT of abdominal pelvis, peritoneal metastasis slightly decreased in size and stable as compared to previous PET scan done in , PET scan, did show hypermetabolic peritoneal nodules at the inferior aspect of hepatic segment 6, increased hypermetabolic of internal iliac lymph node,L>R, questionable pelvic implants     ,  8.8    History of Present Illness:      ID Statement:    JERMAINE SOTO is a 73 year old Female        Chief Complaint: Ovarian cancer   Interval History:    24  Jermaine Soto returns today for follow-up and treatment of ovarian/fallopian tube cancer.  She complains of weakness and fatigue, no nausea, something patient lower  abdominal pain, patient is also complaining lower back pain.  No obvious change in clinically status       Past medical history: Ovarian/fallopian tube cancer, stage IIA, s/p BSO, omentectomy as described above. Last chemotherapy was given on 2022.  CT scans stable/improved.   levels have been normal. .  History hypertension, diabetes, diabetic neuropathy, low back pain, degenerative joint disease, tonsillectomy, vaginal hysterectomy  for benign disease,  Nissen fundoplication,  4 back surgeries, ankle/foot surgeries.  Had biopsy of benign left breast lesion.  No other history of malignancy, MI, CVA or VTE disease.      Family medical history: Mother, father and brother had colon cancer.  Brother had brain tumor.  No family history of breast, ovarian or other gynecologic  cancer.      Social history: Former smoker, quit 1973.  Remote history alcohol use, quit 1973.     Review of Systems:   Review of Systems:    Constitutional: No fever, chills, night sweats.  Some fatigue.  Head and neck: No headaches or dizziness.  No pain, stiffness.   HEENT: No sore throat, sinusitis.  Hearing is adequate. Vison is adequate.   Cardiac: No chest pain, palpitations, lightheadedness.   Left lateral chest wall pain, intermittent.  Respiratory: No increased dyspnea, cough, hemoptysis.   GI: Appetite is good, weight stable.  Occasional lower abdominal discomfort.  Some constipation, diarrhea, change in bowel movements, following treatment.  No melena, hematochezia, nausea, vomiting.  Genitourinary: No frequency, urgency.  No polyuria, dysuria, hematuria.   Musculoskeletal: Complains of mild arthralgias.    Endocrine: History diabetes, no thyroid disease.  Skin: No rash, skin lesions, itching.  Alopecia resolving.  Neuromuscular: No lightheadedness, dizziness.  No history of seizure.  Had some numbness, paresthesias in her feet, slowly improving.  No focal weakness, tremor.                    Allergies and Intolerances:       Allergies:         Bactrim: Drug, Itching (Severe), Rash (Severe), Active     Outpatient Medication Profile:  * Patient Currently Takes Medications as of 13-Sep-2023 12:39 documented in Structured Notes         atorvastatin 80 mg oral tablet : Last Dose Taken:  , 1 tab(s) orally once (at bedtime), Start Date: 05-Jul-2023         apixaban 5 mg oral tablet: Last Dose Taken:  , 1 tab(s) orally every  12 hours, Start Date: 05-Jul-2023         morphine 30 mg/8 to 12 hr  oral tablet, extended release: Last Dose Taken:   , 1 tab(s) orally every 8 hours , Start Date: 24-May-2023         meclizine 25 mg oral tablet: Last Dose Taken:  , 1 tab(s) orally 3 times  a day as needed for dizziness, Start Date: 16-Jan-2023         glimepiride 2 mg oral tablet: Last Dose Taken:  , 1 tab(s) orally once  a day         Allergy Relief 10 mg oral tablet: Last Dose Taken:  , 1 tab(s) orally  once a day         Zinc: Last Dose Taken:  , 50 mg oral once a day         tiZANidine 4 mg oral tablet: Last Dose Taken:  , 2 tab(s) oral prn         Aspir 81 oral delayed release tablet: Last Dose Taken:  , 1 tab(s) orally  once a day         bisoprolol-hydrochlorothiazide 5 mg-6.25 mg oral tablet: Last Dose Taken:   , 1 tab(s) orally once a day         Vitamin C 100 mg oral tablet: Last Dose Taken:  , 2 tab(s) orally 3 times  a day         Vitamin D3 125 mcg (5000 intl units) oral tablet: Last Dose Taken:  ,  1 tab(s) orally 2 times a day         magnesium carbonate 250 mg oral capsule: Last Dose Taken:           lisinopril 10 mg oral tablet: Last Dose Taken:  , 1 cap(s) oral once  a day         omeprazole 40 mg oral delayed release capsule: Last Dose Taken:  , 1  cap(s) oral once a day         senna 8.6 mg oral tablet: Last Dose Taken:  , 1 cap(s) oral once a day         gabapentin 300 mg oral capsule: Last Dose Taken:  , 1 cap(s) oral 3 times  a day         albuterol 2.5 mg/3 mL (0.083%) inhalation solution: Last Dose Taken:   , 1 INHL inhalation once a day             Medical History:         Encounter for antineoplastic chemotherapy: ICD-10: Z51.11,  Status: Active     Family History: No Family History items are recorded  in the problem list.      Social History:   Social Substance History:  ·  Smoking Status former smoker (1)            Vitals and Measurements:   Vitals: Temp: 36.3  HR: 75  RR: 16  BP: 106/72  SPO2%:   94   Measurements: HT(cm): 178.2  WT(kg): 102.3  BSA:  2.25  BMI:  32.2   Last 3 Weights  & Heights: Date:                           Weight/Scale Type:                    Height:   13-Sep-2023 12:27                102.3  kg                     178.2  cm  28-Jun-2023 08:59                104.7  kg                     178.2  cm  25-May-2023 13:19                108  kg                     178.2  cm      Physical Exam:      Constitutional: Well developed, awake/alert/oriented  x3.   Eyes: PERRL, EOMI, clear sclera.   ENMT: No external lesions.   Head/Neck: Neck with normal movement.  No mass, adenopathy  or tenderness.  No JVD. Trachea midline.   Respiratory/Thorax: Thorax symmetric. Clear to auscultation.   No wheezes, rales, rhonchi.  Mild tenderness palpation left lower anterolateral chest wall/ribs, but no mass lesion, skin lesions.   Cardiovascular: Regular, rate and rhythm. No murmur.   No rubs or gallops. Normal S1, S2.   Gastrointestinal: Nondistended.  Healed abdominal  incision.  No masses palpable.  No palpable hepatomegaly, splenomegaly.   Lower abdominal pain on palpation   Musculoskeletal: Mild joint deformities.   Extremities: Unremarkable extremities; no cyanosis,   No edema.   Neurological: Alert and oriented x3.   Lymphatic: No palpably significant lymphadenopathy  in the neck, supraclavicular areas.   Psychological: Appropriate mood and behavior.   Skin: Warm and dry, no rashes, no suspicious lesions.         Lab Results:     Result Notes            Component  Ref Range & Units 2 d ago  (6/10/24) 2 mo ago  (3/15/24) 6 mo ago  (12/15/23) 9 mo ago  (9/13/23) 9 mo ago  (9/8/23) 1 yr ago  (5/19/23) 1 yr ago  (2/10/23)   Cancer   0.0 - 30.2 U/mL 8.8 10.4 20.6 CANCELED R, CM 10.8 CM        Notes            Component  Ref Range & Units 14:28  (9/12/24) 3 mo ago  (5/17/24) 3 mo ago  (5/16/24) 4 mo ago  (5/15/24) 4 mo ago  (5/14/24) 4 mo ago  (5/13/24) 4 mo ago  (4/22/24)   WBC  4.4 - 11.3 x10*3/uL 5.0 6.6 6.1 5.4 6.5 9.1 5.0   RBC  4.00 - 5.20 x10*6/uL 4.20 4.20 4.28 3.56 Low  3.52 Low   3.82 Low  4.20   Hemoglobin  12.0 - 16.0 g/dL 10.1 Low  10.7 Low  10.9 Low  9.3 Low  9.2 Low  9.9 Low  10.9 Low    Hematocrit  36.0 - 46.0 % 34.5 Low  33.6 Low  34.6 Low  29.0 Low  28.8 Low  31.2 Low  35.1 Low    MCV  80 - 100 fL 82 80 81 82 82 82 84   MCH  26.0 - 34.0 pg 24.0 Low  25.5 Low  25.5 Low  26.1 26.1 25.9 Low  26.0   MCHC  32.0 - 36.0 g/dL 29.3 Low  31.8 Low  31.5 Low  32.1 31.9 Low  31.7 Low  31.1 Low    RDW  11.5 - 14.5 % 16.1 High  16.0 High  16.0 High  15.9 High  16.5 High  16.5 High  15.8 High    Platelets  150 - 450 x10*3/uL 156 189             ·  Results         PET scan , 6/7/24  Hypermetabolic peritoneal nodule abutting hepatic segment 8 and  hypermetabolic nodule at the inferior aspect of hepatic segment 6  consistent with metastatic disease.  2. Increased hypermetabolic left greater than right internal iliac  metastatic lymphadenopathy versus pelvic implants.  3. Questionable hypermetabolic soft tissue nodule at the right aspect  of a right lower quadrant bowel containing hernia also concerning for  metastatic disease. Attention on follow-up imaging is recommended.  4. Indeterminate mildly hypermetabolic focus in the right hamstring  musculature without a definite underlying CT abnormality may be  physiologic, however a metastatic implant can not be definitely  excluded.             Assessment and Plan:      Assessment and Plan:   Assessment:    1.  High-grade serous carcinoma right ovary/fallopian tube/, status postpelvic mass BSO, omentectomy, pelvic washings, FIGO stage IIa (pT2a pN0 cM0).  Status post  6 cycles adjuvant chemotherapy with paclitaxel/carboplatin.  Had significant musculoskeletal pain with Taxol infusions.  Ca-125 level elevated at 40.9 (previously normal).  CT shows evidence of progression.  Symptomatic. s/p 4 cycles carboplatin/gemcitabine  October 13, 2022 CAT scan abdominal pelvis did show omental mass with decrease in size, patient is receiving only grade 1 chemotherapy  including carboplatin and gemcitabine because of pancytopenia  11/11/22: Last dose of carboplatin and gemcitabine  2/11/23: CT of CAP: Chest- stable disease, no evidence of progression of disease, peritoneal nodule measuring 3 cm which is stable.  5/19/2023: : 11.1.  June 6, 2023, CT of  abdominal pelvis, peritoneal deposit slightly increased in size     9/09/23 , PET , 1. FDG avid soft tissue implants in the left aspect of the pelvis, similar in size to abdominopelvic CT dated 05/30/2023, and remain concerning for metastatic disease. 2. Newly developed FDG avid soft tissue in the right aspect of the  pelvis may reflect a metastatic lymph node versus metastatic implant.   1/4/24 , CT of A/P , stable   6/7/24 , PET scan stable, still have persistent disease  2.  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      3.  Multiple medical problems.      4.  Abdominal pain, likely secondary to malignancy versus benign disease.  Controlled with MS Contin/oxycodone.     5.  History small right lung nodules of uncertain significance.     6.  Left lateral chest wall pain, intermittent, likely benign, rule out malignancy..     Plan: 9/12/24  No change in clinically status, patient still has lower abdominal pain and lower back pain.   has been stable.   Today  is pending.  No change in clinically status.  I will repeat PET scan follow-up after PET scan.  Continue to monitor clinically     Greater than 30 minutes time spent with patient reviewing diagnostic studies, discussing diagnosis and treatment plan and documenting in EMR.

## 2024-09-12 NOTE — PATIENT INSTRUCTIONS
Follow up visit for history of ovarian cancer.     Repeat PET scan to be done in December.     Follow up with Dr. Weiss after PET scan to review results.

## 2024-09-26 ENCOUNTER — TELEPHONE (OUTPATIENT)
Dept: HEMATOLOGY/ONCOLOGY | Facility: CLINIC | Age: 74
End: 2024-09-26
Payer: COMMERCIAL

## 2024-09-26 DIAGNOSIS — G89.4 CHRONIC PAIN SYNDROME: ICD-10-CM

## 2024-09-26 DIAGNOSIS — C57.01 CARCINOMA OF RIGHT FALLOPIAN TUBE (MULTI): ICD-10-CM

## 2024-09-26 RX ORDER — OXYCODONE HYDROCHLORIDE 10 MG/1
10 TABLET ORAL EVERY 6 HOURS PRN
Qty: 120 TABLET | Refills: 0 | Status: SHIPPED | OUTPATIENT
Start: 2024-09-26

## 2024-10-01 ENCOUNTER — APPOINTMENT (OUTPATIENT)
Dept: RADIOLOGY | Facility: HOSPITAL | Age: 74
End: 2024-10-01
Payer: MEDICARE

## 2024-10-01 ENCOUNTER — HOSPITAL ENCOUNTER (EMERGENCY)
Facility: HOSPITAL | Age: 74
Discharge: HOME | End: 2024-10-01
Payer: MEDICARE

## 2024-10-01 VITALS
HEART RATE: 65 BPM | DIASTOLIC BLOOD PRESSURE: 69 MMHG | BODY MASS INDEX: 31.83 KG/M2 | TEMPERATURE: 96.8 F | OXYGEN SATURATION: 95 % | SYSTOLIC BLOOD PRESSURE: 110 MMHG | WEIGHT: 235 LBS | HEIGHT: 72 IN | RESPIRATION RATE: 16 BRPM

## 2024-10-01 LAB
ALBUMIN SERPL BCP-MCNC: 4 G/DL (ref 3.4–5)
ALP SERPL-CCNC: 57 U/L (ref 33–136)
ALT SERPL W P-5'-P-CCNC: 9 U/L (ref 7–45)
ANION GAP SERPL CALC-SCNC: 11 MMOL/L (ref 10–20)
APPEARANCE UR: CLEAR
AST SERPL W P-5'-P-CCNC: 14 U/L (ref 9–39)
BASOPHILS # BLD AUTO: 0.04 X10*3/UL (ref 0–0.1)
BASOPHILS NFR BLD AUTO: 0.5 %
BILIRUB SERPL-MCNC: 0.4 MG/DL (ref 0–1.2)
BILIRUB UR STRIP.AUTO-MCNC: NEGATIVE MG/DL
BUN SERPL-MCNC: 24 MG/DL (ref 6–23)
CALCIUM SERPL-MCNC: 9.7 MG/DL (ref 8.6–10.3)
CHLORIDE SERPL-SCNC: 98 MMOL/L (ref 98–107)
CO2 SERPL-SCNC: 30 MMOL/L (ref 21–32)
COLOR UR: ABNORMAL
CREAT SERPL-MCNC: 1.03 MG/DL (ref 0.5–1.05)
EGFRCR SERPLBLD CKD-EPI 2021: 58 ML/MIN/1.73M*2
EOSINOPHIL # BLD AUTO: 0.06 X10*3/UL (ref 0–0.4)
EOSINOPHIL NFR BLD AUTO: 0.7 %
ERYTHROCYTE [DISTWIDTH] IN BLOOD BY AUTOMATED COUNT: 16.8 % (ref 11.5–14.5)
GLUCOSE SERPL-MCNC: 78 MG/DL (ref 74–99)
GLUCOSE UR STRIP.AUTO-MCNC: ABNORMAL MG/DL
HCT VFR BLD AUTO: 37.1 % (ref 36–46)
HGB BLD-MCNC: 11.2 G/DL (ref 12–16)
IMM GRANULOCYTES # BLD AUTO: 0.02 X10*3/UL (ref 0–0.5)
IMM GRANULOCYTES NFR BLD AUTO: 0.2 % (ref 0–0.9)
KETONES UR STRIP.AUTO-MCNC: NEGATIVE MG/DL
LACTATE SERPL-SCNC: 0.7 MMOL/L (ref 0.4–2)
LEUKOCYTE ESTERASE UR QL STRIP.AUTO: ABNORMAL
LIPASE SERPL-CCNC: 8 U/L (ref 9–82)
LYMPHOCYTES # BLD AUTO: 1.74 X10*3/UL (ref 0.8–3)
LYMPHOCYTES NFR BLD AUTO: 20.1 %
MCH RBC QN AUTO: 24.4 PG (ref 26–34)
MCHC RBC AUTO-ENTMCNC: 30.2 G/DL (ref 32–36)
MCV RBC AUTO: 81 FL (ref 80–100)
MONOCYTES # BLD AUTO: 0.44 X10*3/UL (ref 0.05–0.8)
MONOCYTES NFR BLD AUTO: 5.1 %
NEUTROPHILS # BLD AUTO: 6.37 X10*3/UL (ref 1.6–5.5)
NEUTROPHILS NFR BLD AUTO: 73.4 %
NITRITE UR QL STRIP.AUTO: NEGATIVE
NRBC BLD-RTO: 0 /100 WBCS (ref 0–0)
PH UR STRIP.AUTO: 5.5 [PH]
PLATELET # BLD AUTO: 229 X10*3/UL (ref 150–450)
POTASSIUM SERPL-SCNC: 4.1 MMOL/L (ref 3.5–5.3)
PROT SERPL-MCNC: 7.3 G/DL (ref 6.4–8.2)
PROT UR STRIP.AUTO-MCNC: NEGATIVE MG/DL
RBC # BLD AUTO: 4.59 X10*6/UL (ref 4–5.2)
RBC # UR STRIP.AUTO: NEGATIVE /UL
RBC #/AREA URNS AUTO: NORMAL /HPF
SODIUM SERPL-SCNC: 135 MMOL/L (ref 136–145)
SP GR UR STRIP.AUTO: 1.02
SQUAMOUS #/AREA URNS AUTO: NORMAL /HPF
UROBILINOGEN UR STRIP.AUTO-MCNC: NORMAL MG/DL
WBC # BLD AUTO: 8.7 X10*3/UL (ref 4.4–11.3)
WBC #/AREA URNS AUTO: NORMAL /HPF

## 2024-10-01 PROCEDURE — 99284 EMERGENCY DEPT VISIT MOD MDM: CPT | Mod: 25

## 2024-10-01 PROCEDURE — 96374 THER/PROPH/DIAG INJ IV PUSH: CPT | Mod: 59

## 2024-10-01 PROCEDURE — 85025 COMPLETE CBC W/AUTO DIFF WBC: CPT | Performed by: NURSE PRACTITIONER

## 2024-10-01 PROCEDURE — 2550000001 HC RX 255 CONTRASTS: Performed by: NURSE PRACTITIONER

## 2024-10-01 PROCEDURE — 74177 CT ABD & PELVIS W/CONTRAST: CPT | Mod: FOREIGN READ | Performed by: RADIOLOGY

## 2024-10-01 PROCEDURE — 83690 ASSAY OF LIPASE: CPT | Performed by: NURSE PRACTITIONER

## 2024-10-01 PROCEDURE — 83605 ASSAY OF LACTIC ACID: CPT | Performed by: NURSE PRACTITIONER

## 2024-10-01 PROCEDURE — 96361 HYDRATE IV INFUSION ADD-ON: CPT

## 2024-10-01 PROCEDURE — 74177 CT ABD & PELVIS W/CONTRAST: CPT

## 2024-10-01 PROCEDURE — 36415 COLL VENOUS BLD VENIPUNCTURE: CPT | Performed by: NURSE PRACTITIONER

## 2024-10-01 PROCEDURE — 96375 TX/PRO/DX INJ NEW DRUG ADDON: CPT

## 2024-10-01 PROCEDURE — 2500000004 HC RX 250 GENERAL PHARMACY W/ HCPCS (ALT 636 FOR OP/ED): Performed by: NURSE PRACTITIONER

## 2024-10-01 PROCEDURE — 80053 COMPREHEN METABOLIC PANEL: CPT | Performed by: NURSE PRACTITIONER

## 2024-10-01 PROCEDURE — 81003 URINALYSIS AUTO W/O SCOPE: CPT | Performed by: NURSE PRACTITIONER

## 2024-10-01 PROCEDURE — 87086 URINE CULTURE/COLONY COUNT: CPT | Mod: PORLAB | Performed by: NURSE PRACTITIONER

## 2024-10-01 PROCEDURE — 96376 TX/PRO/DX INJ SAME DRUG ADON: CPT

## 2024-10-01 RX ORDER — HYDROMORPHONE HYDROCHLORIDE 1 MG/ML
1 INJECTION, SOLUTION INTRAMUSCULAR; INTRAVENOUS; SUBCUTANEOUS ONCE
Status: COMPLETED | OUTPATIENT
Start: 2024-10-01 | End: 2024-10-01

## 2024-10-01 RX ORDER — ONDANSETRON HYDROCHLORIDE 2 MG/ML
4 INJECTION, SOLUTION INTRAVENOUS ONCE
Status: COMPLETED | OUTPATIENT
Start: 2024-10-01 | End: 2024-10-01

## 2024-10-01 ASSESSMENT — PAIN SCALES - GENERAL
PAINLEVEL_OUTOF10: 10 - WORST POSSIBLE PAIN
PAINLEVEL_OUTOF10: 10 - WORST POSSIBLE PAIN

## 2024-10-01 ASSESSMENT — PAIN - FUNCTIONAL ASSESSMENT: PAIN_FUNCTIONAL_ASSESSMENT: 0-10

## 2024-10-01 ASSESSMENT — LIFESTYLE VARIABLES
HAVE PEOPLE ANNOYED YOU BY CRITICIZING YOUR DRINKING: NO
EVER HAD A DRINK FIRST THING IN THE MORNING TO STEADY YOUR NERVES TO GET RID OF A HANGOVER: NO
HAVE YOU EVER FELT YOU SHOULD CUT DOWN ON YOUR DRINKING: NO
EVER FELT BAD OR GUILTY ABOUT YOUR DRINKING: NO
TOTAL SCORE: 0

## 2024-10-01 ASSESSMENT — PAIN DESCRIPTION - ORIENTATION: ORIENTATION: LEFT

## 2024-10-01 ASSESSMENT — PAIN DESCRIPTION - LOCATION: LOCATION: BACK

## 2024-10-02 ENCOUNTER — TELEPHONE (OUTPATIENT)
Dept: HEMATOLOGY/ONCOLOGY | Facility: CLINIC | Age: 74
End: 2024-10-02
Payer: MEDICARE

## 2024-10-02 LAB — HOLD SPECIMEN: NORMAL

## 2024-10-02 NOTE — TELEPHONE ENCOUNTER
Reviewed CT results from ER visit. Information forward to provider requesting the PET scan be rescheduled from Dec to sooner.  Order received and Antoine was scheduled for 10/11 at 1405.   PAS rescheduled and left a msg for Antoine

## 2024-10-02 NOTE — TELEPHONE ENCOUNTER
Tiffanie states she took Vicie to ED due to pain and they suggested we do PET sooner than December due to CT results.  Please call Tiffanie POA bacl

## 2024-10-02 NOTE — ED PROVIDER NOTES
HPI   Chief Complaint   Patient presents with   • Flank Pain     X 2 weeks l flank and back.  No nausea or vomiting.  On pain meds currently for ovarian ca       This is a 74-year-old  female, with a history of ovarian cancer, that is presenting to the emergency room with complaints of left flank and back pain for the past 2 weeks.  The patient reports that the pain has gotten progressively worse over the last couple of days.  The patient has not had any associated nausea or vomiting.  Denies any alteration in her urine or bowel function.  She reports she has occasional constipation.  She is not having any fevers or cold-like symptoms.  Denies any trauma or strenuous exercise.  Denies any rash.  Patient is on oxycodone and morphine at home for pain.  She rates her discomfort a 10 out of 10 on the pain scale.  Movement makes the pain worse.  Patient is not experiencing any chest pain, shortness of breath, dizziness, palpitations, paresthesias, focal weakness.  Denies any headache, vision changes, speech changes, or syncope.              Patient History   Past Medical History:   Diagnosis Date   • Hx antineoplastic chemo    • Intra-abdominal and pelvic swelling, mass and lump, unspecified site 01/20/2021    Pelvic mass in female   • Other nonspecific abnormal finding of lung field 01/11/2021    Lung infiltrate on CT   • Ovarian cancer (Multi)    • Personal history of other diseases of the circulatory system 01/20/2021    History of hypertension   • Personal history of other diseases of the musculoskeletal system and connective tissue     History of chronic back pain   • Personal history of other endocrine, nutritional and metabolic disease 01/20/2021    History of hyperlipidemia   • Personal history of other endocrine, nutritional and metabolic disease 01/20/2021    History of diabetes mellitus     Past Surgical History:   Procedure Laterality Date   • CT GUIDED PERCUTANEOUS BIOPSY LIVER  06/16/2023    CT  GUIDED PERCUTANEOUS BIOPSY LIVER 6/16/2023 POR CT   • HYSTERECTOMY     • MR HEAD ANGIO WO IV CONTRAST  01/16/2023    MR HEAD ANGIO WO IV CONTRAST 1/16/2023 [facility]   • MR NECK ANGIO WO IV CONTRAST  01/16/2023    MR NECK ANGIO WO IV CONTRAST 1/16/2023 DOCTOR OFF[facility]   • OTHER SURGICAL HISTORY  02/04/2020    Nissen fundoplication laparoscopic   • OTHER SURGICAL HISTORY  02/04/2020    Lower back surgery   • OTHER SURGICAL HISTORY  02/04/2020    Hysterectomy   • OTHER SURGICAL HISTORY  02/04/2020    Tonsillectomy   • OTHER SURGICAL HISTORY  02/04/2020    Foot surgery   • OTHER SURGICAL HISTORY  02/04/2020    Lumpectomy     No family history on file.  Social History     Tobacco Use   • Smoking status: Never   • Smokeless tobacco: Never   Vaping Use   • Vaping status: Never Used   Substance Use Topics   • Alcohol use: Never   • Drug use: Yes     Types: Morphine, Oxycodone       Physical Exam   ED Triage Vitals [10/01/24 1755]   Temperature Heart Rate Respirations BP   35.7 °C (96.2 °F) 74 16 117/75      Pulse Ox Temp src Heart Rate Source Patient Position   (!) 93 % -- -- --      BP Location FiO2 (%)     -- --       Physical Exam  Vitals and nursing note reviewed.   HENT:      Head: Normocephalic.      Right Ear: External ear normal.      Left Ear: External ear normal.      Nose: Nose normal.      Mouth/Throat:      Pharynx: Oropharynx is clear.   Eyes:      Conjunctiva/sclera: Conjunctivae normal.   Cardiovascular:      Rate and Rhythm: Normal rate and regular rhythm.      Pulses: Normal pulses.      Heart sounds: Normal heart sounds.   Pulmonary:      Effort: Pulmonary effort is normal.      Breath sounds: Normal breath sounds.   Abdominal:      General: Bowel sounds are normal.      Palpations: Abdomen is soft.      Tenderness: There is left CVA tenderness.   Musculoskeletal:         General: Normal range of motion.      Cervical back: Normal range of motion.   Skin:     General: Skin is warm.       Capillary Refill: Capillary refill takes less than 2 seconds.   Neurological:      General: No focal deficit present.      Mental Status: She is alert.   Psychiatric:         Mood and Affect: Mood normal.           ED Course & MDM   Diagnoses as of 10/31/24 2300   Flank pain                 No data recorded     [place] Coma Scale Score: 15 (10/01/24 1756 : Deya Gold RN)                           Medical Decision Making  Patient was seen and evaluated by the nurse practitioner, Marybeth Pop.  Old records were obtained and reviewed.  Patient is presenting to the emergency room with complaints of left flank and back pain.  Differential diagnosis includes urinary tract infection, kidney stone, pyelonephritis, pancreatitis, Crohn's, diverticulitis, constipation, musculoskeletal etiology, shingles, metastasis, or other acute process.  A saline lock was established.  Laboratory studies were drawn with results as noted.  The patient was administered 1 L of normal saline, Dilaudid 1 mg IVP, and Zofran 4 mg IVP.  The patient's laboratory studies revealed that she did not have any acute leukocytosis.  Hemoglobin was stable at 11.2.  Lactic acid was 0.7.  Lipase was 8.  Routine urinalysis did not indicate a urinary tract infection.  A CT of the abdomen pelvis was performed and showed a lesion within the right hepatic lobe that has increased in size from previous PET scan on 6/7/2024.  There was also a lesion noted in the left colon which has increased in size as well.  The patient was notified of the imaging results.  The patient did not have any evidence of a obstructive uropathy, diverticulitis, Crohn's, or pancreatitis.  The patient has no evidence of pneumonia.  At this time, we suspect that the patient's pain is as a result of her expanding lesions.  The patient was advised to follow-up with her oncologist.  She has a PET scan scheduled for December.  The patient is to use the previously prescribed pain medications.   She is to return if she has any worsening symptomatology.  The patient was discharged in stable condition with computer discharge instructions given.        Procedure  Procedures     MINISTERIO Infante  10/01/24 8607       MINISTERIO Infante  10/31/24 [id number]

## 2024-10-03 DIAGNOSIS — C56.3: ICD-10-CM

## 2024-10-03 LAB — BACTERIA UR CULT: NORMAL

## 2024-10-09 ENCOUNTER — TELEPHONE (OUTPATIENT)
Dept: HEMATOLOGY/ONCOLOGY | Facility: CLINIC | Age: 74
End: 2024-10-09
Payer: MEDICARE

## 2024-10-09 ENCOUNTER — TELEPHONE (OUTPATIENT)
Dept: PRIMARY CARE | Facility: CLINIC | Age: 74
End: 2024-10-09
Payer: MEDICARE

## 2024-10-09 DIAGNOSIS — C57.01 CARCINOMA OF RIGHT FALLOPIAN TUBE (MULTI): ICD-10-CM

## 2024-10-09 NOTE — TELEPHONE ENCOUNTER
Antoine is calling for a refill of Morphine CR.  It was last refilled on 9/10/24  Can you send in a refill?

## 2024-10-09 NOTE — TELEPHONE ENCOUNTER
She called cause the med Farxiga 10 mg is now cost $100 cause she is in a dounut hole and she wants to know if there is anything else she can take please advise and call her at [phone]

## 2024-10-10 RX ORDER — MORPHINE SULFATE 60 MG/1
60 TABLET, FILM COATED, EXTENDED RELEASE ORAL 2 TIMES DAILY
Qty: 60 TABLET | Refills: 0 | Status: SHIPPED | OUTPATIENT
Start: 2024-10-10 | End: 2024-11-09

## 2024-10-11 ENCOUNTER — HOSPITAL ENCOUNTER (OUTPATIENT)
Dept: RADIOLOGY | Facility: HOSPITAL | Age: 74
Discharge: HOME | End: 2024-10-11
Payer: MEDICARE

## 2024-10-11 DIAGNOSIS — C56.3: ICD-10-CM

## 2024-10-11 DIAGNOSIS — R53.1 GENERAL WEAKNESS: ICD-10-CM

## 2024-10-11 DIAGNOSIS — C57.01 CARCINOMA OF RIGHT FALLOPIAN TUBE (MULTI): ICD-10-CM

## 2024-10-11 PROCEDURE — 3430000001 HC RX 343 DIAGNOSTIC RADIOPHARMACEUTICALS: Performed by: INTERNAL MEDICINE

## 2024-10-11 PROCEDURE — 78815 PET IMAGE W/CT SKULL-THIGH: CPT | Mod: PS

## 2024-10-11 PROCEDURE — A9552 F18 FDG: HCPCS | Performed by: INTERNAL MEDICINE

## 2024-10-11 RX ORDER — FLUDEOXYGLUCOSE F 18 200 MCI/ML
13.2 INJECTION, SOLUTION INTRAVENOUS
Status: COMPLETED | OUTPATIENT
Start: 2024-10-11 | End: 2024-10-11

## 2024-10-16 DIAGNOSIS — E11.40 CONTROLLED TYPE 2 DIABETES MELLITUS WITH DIABETIC NEUROPATHY, WITHOUT LONG-TERM CURRENT USE OF INSULIN: ICD-10-CM

## 2024-10-16 DIAGNOSIS — I10 PRIMARY HYPERTENSION: ICD-10-CM

## 2024-10-16 RX ORDER — BISOPROLOL FUMARATE AND HYDROCHLOROTHIAZIDE 2.5; 6.25 MG/1; MG/1
1 TABLET ORAL DAILY
Qty: 90 TABLET | Refills: 2 | Status: SHIPPED | OUTPATIENT
Start: 2024-10-16

## 2024-10-16 RX ORDER — BLOOD-GLUCOSE METER
1 KIT MISCELLANEOUS 2 TIMES DAILY
Qty: 200 STRIP | Refills: 2 | Status: SHIPPED | OUTPATIENT
Start: 2024-10-16

## 2024-10-17 ENCOUNTER — OFFICE VISIT (OUTPATIENT)
Dept: HEMATOLOGY/ONCOLOGY | Facility: CLINIC | Age: 74
End: 2024-10-17
Payer: COMMERCIAL

## 2024-10-17 ENCOUNTER — SOCIAL WORK (OUTPATIENT)
Dept: HEMATOLOGY/ONCOLOGY | Facility: CLINIC | Age: 74
End: 2024-10-17

## 2024-10-17 VITALS
SYSTOLIC BLOOD PRESSURE: 110 MMHG | TEMPERATURE: 98.1 F | WEIGHT: 231.59 LBS | DIASTOLIC BLOOD PRESSURE: 69 MMHG | BODY MASS INDEX: 31.41 KG/M2 | OXYGEN SATURATION: 99 % | RESPIRATION RATE: 16 BRPM | HEART RATE: 70 BPM

## 2024-10-17 DIAGNOSIS — G89.4 CHRONIC PAIN SYNDROME: ICD-10-CM

## 2024-10-17 DIAGNOSIS — C57.01 CARCINOMA OF RIGHT FALLOPIAN TUBE (MULTI): ICD-10-CM

## 2024-10-17 DIAGNOSIS — C56.3: ICD-10-CM

## 2024-10-17 DIAGNOSIS — G62.0 NEUROPATHY DUE TO CHEMOTHERAPEUTIC DRUG (MULTI): ICD-10-CM

## 2024-10-17 DIAGNOSIS — R53.1 GENERAL WEAKNESS: ICD-10-CM

## 2024-10-17 DIAGNOSIS — T45.1X5A NEUROPATHY DUE TO CHEMOTHERAPEUTIC DRUG (MULTI): ICD-10-CM

## 2024-10-17 PROCEDURE — 99215 OFFICE O/P EST HI 40 MIN: CPT | Performed by: INTERNAL MEDICINE

## 2024-10-17 PROCEDURE — 4010F ACE/ARB THERAPY RXD/TAKEN: CPT | Performed by: INTERNAL MEDICINE

## 2024-10-17 PROCEDURE — 3078F DIAST BP <80 MM HG: CPT | Performed by: INTERNAL MEDICINE

## 2024-10-17 PROCEDURE — 1125F AMNT PAIN NOTED PAIN PRSNT: CPT | Performed by: INTERNAL MEDICINE

## 2024-10-17 PROCEDURE — 1157F ADVNC CARE PLAN IN RCRD: CPT | Performed by: INTERNAL MEDICINE

## 2024-10-17 PROCEDURE — 3052F HG A1C>EQUAL 8.0%<EQUAL 9.0%: CPT | Performed by: INTERNAL MEDICINE

## 2024-10-17 PROCEDURE — 1159F MED LIST DOCD IN RCRD: CPT | Performed by: INTERNAL MEDICINE

## 2024-10-17 PROCEDURE — 3048F LDL-C <100 MG/DL: CPT | Performed by: INTERNAL MEDICINE

## 2024-10-17 PROCEDURE — 3062F POS MACROALBUMINURIA REV: CPT | Performed by: INTERNAL MEDICINE

## 2024-10-17 PROCEDURE — 3074F SYST BP LT 130 MM HG: CPT | Performed by: INTERNAL MEDICINE

## 2024-10-17 PROCEDURE — 1160F RVW MEDS BY RX/DR IN RCRD: CPT | Performed by: INTERNAL MEDICINE

## 2024-10-17 RX ORDER — MORPHINE SULFATE 60 MG/1
60 TABLET, FILM COATED, EXTENDED RELEASE ORAL 3 TIMES DAILY
Qty: 90 TABLET | Refills: 0 | Status: SHIPPED | OUTPATIENT
Start: 2024-10-17

## 2024-10-17 RX ORDER — OXYCODONE HYDROCHLORIDE 10 MG/1
10 TABLET ORAL EVERY 4 HOURS PRN
Qty: 180 TABLET | Refills: 0 | Status: SHIPPED | OUTPATIENT
Start: 2024-10-17

## 2024-10-17 ASSESSMENT — NCCN CANCER DISTRESS MANAGEMENT
NCCN PHYSICAL CONCERNS: 2
NCCN PHYSICAL CONCERNS: 1

## 2024-10-17 ASSESSMENT — PAIN SCALES - GENERAL: PAINLEVEL_OUTOF10: 8

## 2024-10-17 NOTE — PATIENT INSTRUCTIONS
You were provided information on treatment options. Please review and call office with any questions or concerns.     Follow up with Dr. Weiss in 2 weeks.

## 2024-10-17 NOTE — PROGRESS NOTES
Patient had a follow-up with Dr. Weiss.  Patient has progressive ovarian cancer.  Patient scored an 8 on her distress screen.  Patient identified pain and sleep.   (SW) met with patient, son and daughter today to assess needs and offer support.  Patient appeared sad with the news.  SW provided support and active listening.  Patient will be thinking about her decision on treatment and follow up in a few weeks.  SW asked if she could think of anything that she needs.  Patient is interested in a wheelchair.  SW will order with [facility].  Patient stated that she also has a hospital bed which has been helpful.  Patient's daughter and  have moved in with patient.  SW encouraged them to reach out if they would need anything.  SW will continue to follow patient.      Jorge Alberto Nash MSW, LSW

## 2024-10-17 NOTE — PROGRESS NOTES
DME Referral     Referral Source:   Equipment Needed: Standard Wheelchair  DME Company: [employer] (P: [phone] ext 1/F: [phone])  Referral completed by:  Dr.Said Weiss   Referral submitted: via fax on 10/17/24  Additional Information: N/A    Pt updated and verbalized understanding of DME referral process. SW will continue to follow as needed.

## 2024-10-17 NOTE — PROGRESS NOTES
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   Presented with right lower quadrant pain in late December that was initially attributed to gallstones.  On further work-up by her surgeon, ultrasound of the pelvis showed bilateral ovarian mass lesions measuring 6.4 cm on the right and 4.3 cm in the left.   CA-125: 24.0.  CT chest showed small lesions in right lung measuring 0.4 cm in RLL and 0.5 cm in RML.  Mammogram was negative.   3/1/2021: BSO, omentectomy and biopsies.  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 pelvic mass.  Left ovary/tube, omentum: negative.  0/8 nodes positive.   Adj. chemo recommended.   4/5/2021: : 15.5  4/12/2021: Cycle #1/6 adjuvant chemotherapy with paclitaxel/carboplatin  5/3/21: #2 Carbo/Taxol  5/24/21: #3 Carbo/taxol  6/21/21: #4/6 carbo/Taxol.  7/13/2021: #5/6 carbo/Taxol.  8/3/2021: #6/6 carbo/Taxol  8/11/2021: CA-125: 8.5.  1/10/2022: : 9.2.   4/28/2022: : 40.9.  5/16/2022: CT abdomen pelvis: Increased adenopathy consistent with progressive disease.  6/22/2022: : 41.0.  7/15/2022: #1 carboplatin/gemcitabine.  8/12/2022: #2 carboplatin/gemcitabine. D8 chemo was held because of pancytopenia  9/1/2022: : 13.9.  9/9/22      C3D1 , no D8  9/30/22    C4, carboplatin and gemcitabine with dose reduction, no day 8 chemo  10/13/22 , CT of A/P ,  Cystic mass noted  along the right posterior liver has increased in size, however all of  the othermesenteric and pelvic metastatic implants appear to be  decreased. No new sites of metastatic disease.  10/21/22   C5D1  11/11/22  C6D1  2/11/23 , CT of CAP , CHEST  1.  Stablechronic changes without evidence of metastatic disease  within the chest.   ABDOMEN - PELVIS  1.  Status post hysterectomy and bilateral salpingo-oophorectomy,  with similar to mildly  decreased appearance of peritoneal and  mesenteric deposits as described above. No visualized new metastatic  deposits. Maximum decrease is seen in the cystic lesion adjacent to  the inferior margin of the liver parenchyma.  5/19/2023: : 11.1 (normal).  6/13/23 , CT of abdominal pelvis, peritoneal deposits slightly increased in size      June 16, 2023, right chest wall subcutaneous nodule, needle biopsy negative for malignancy  9/09/23 , PET , 1. FDG avid soft tissue implants in the left aspect of the pelvis, similar in size to abdominopelvic CT dated 05/30/2023, and remain concerning for metastatic disease. 2. Newly developed FDG avid soft tissue in the right aspect of the  pelvis may reflect a metastatic lymph node versus metastatic implant.     1/4/24 , CT of abdominal pelvis, peritoneal metastasis slightly decreased in size and stable as compared to previous PET scan done in June 2023 June 7, 2024, PET scan, did show hypermetabolic peritoneal nodules at the inferior aspect of hepatic segment 6, increased hypermetabolic of internal iliac lymph node,L>R, questionable pelvic implants  6/0/24 ,  8.8  10/11/14 , PET ,  Interval worsening of FDG avid disease in abdomen and pelvis as well as newly developed FDG avid bilateral inguinal nodes as described above. 2. Stable FDG avid liver metastases in the right hepatic lobe 3. Interval increase in FDG uptake in soft tissue nodule along right aspect of the abdominal wall hernia.  History of Present Illness:      ID Statement:    JERMAINE SOTO is a 73 year old Female        Chief Complaint: Ovarian cancer   Interval History:    10/17/24  Jermaine Soto returns today for follow-up and treatment of ovarian/fallopian tube cancer.  She complains of weakness and fatigue, no nausea, patient is complaining of lower abdominal pain and right upper quadrant pain not controlled with morphine and oxycodone.  PET scan did show progression of disease involving progressive  hepatic metastatic disease increase mesenteric nodularity.    Family meeting today PET scan result discussed with patient  Past medical history: Ovarian/fallopian tube cancer, stage IIA, s/p BSO, omentectomy as described above. Last chemotherapy was given on 2022.  CT scans stable/improved.   levels have been normal. .  History hypertension, diabetes, diabetic neuropathy, low back pain, degenerative joint disease, tonsillectomy, vaginal hysterectomy  for benign disease, Nissen fundoplication,  4 back surgeries, ankle/foot surgeries.  Had biopsy of benign left breast lesion.  No other history of malignancy, MI, CVA or VTE disease.      Family medical history: Mother, father and brother had colon cancer.  Brother had brain tumor.  No family history of breast, ovarian or other gynecologic  cancer.      Social history: Former smoker, quit .  Remote history alcohol use, quit .     Review of Systems:   Review of Systems:    Constitutional: No fever, chills, night sweats.  Some fatigue.  Head and neck: No headaches or dizziness.  No pain, stiffness.   HEENT: No sore throat, sinusitis.  Hearing is adequate. Vison is adequate.   Cardiac: No chest pain, palpitations, lightheadedness.   Left lateral chest wall pain, intermittent.  Respiratory: No increased dyspnea, cough, hemoptysis.   GI: Appetite is good, weight stable.  Occasional lower abdominal discomfort.  Some constipation, diarrhea, change in bowel movements, following treatment.  No melena, hematochezia, nausea, vomiting.  Genitourinary: No frequency, urgency.  No polyuria, dysuria, hematuria.   Musculoskeletal: Complains of mild arthralgias.    Endocrine: History diabetes, no thyroid disease.  Skin: No rash, skin lesions, itching.  Alopecia resolving.  Neuromuscular: No lightheadedness, dizziness.  No history of seizure.  Had some numbness, paresthesias in her feet, slowly improving.  No focal weakness, tremor.                     Allergies and Intolerances:       Allergies:         Bactrim: Drug, Itching (Severe), Rash (Severe), Active     Outpatient Medication Profile:  * Patient Currently Takes Medications as of 13-Sep-2023 12:39 documented in Structured Notes         atorvastatin 80 mg oral tablet : Last Dose Taken:  , 1 tab(s) orally once (at bedtime), Start Date: 05-Jul-2023         apixaban 5 mg oral tablet: Last Dose Taken:  , 1 tab(s) orally every  12 hours, Start Date: 05-Jul-2023         morphine 30 mg/8 to 12 hr oral tablet, extended release: Last Dose Taken:   , 1 tab(s) orally every 8 hours , Start Date: 24-May-2023         meclizine 25 mg oral tablet: Last Dose Taken:  , 1 tab(s) orally 3 times  a day as needed for dizziness, Start Date: 16-Jan-2023         glimepiride 2 mg oral tablet: Last Dose Taken:  , 1 tab(s) orally once  a day         Allergy Relief 10 mg oral tablet: Last Dose Taken:  , 1 tab(s) orally  once a day         Zinc: Last Dose Taken:  , 50 mg oral once a day         tiZANidine 4 mg oral tablet: Last Dose Taken:  , 2 tab(s) oral prn         Aspir 81 oral delayed release tablet: Last Dose Taken:  , 1 tab(s) orally  once a day         bisoprolol-hydrochlorothiazide 5 mg-6.25 mg oral tablet: Last Dose Taken:   , 1 tab(s) orally once a day         Vitamin C 100 mg oral tablet: Last Dose Taken:  , 2 tab(s) orally 3 times  a day         Vitamin D3 125 mcg (5000 intl units) oral tablet: Last Dose Taken:  ,  1 tab(s) orally 2 times a day         magnesium carbonate 250 mg oral capsule: Last Dose Taken:           lisinopril 10 mg oral tablet: Last Dose Taken:  , 1 cap(s) oral once  a day         omeprazole 40 mg oral delayed release capsule: Last Dose Taken:  , 1  cap(s) oral once a day         senna 8.6 mg oral tablet: Last Dose Taken:  , 1 cap(s) oral once a day         gabapentin 300 mg oral capsule: Last Dose Taken:  , 1 cap(s) oral 3 times  a day         albuterol 2.5 mg/3 mL (0.083%) inhalation solution: Last  Dose Taken:   , 1 INHL inhalation once a day             Medical History:         Encounter for antineoplastic chemotherapy: ICD-10: Z51.11,  Status: Active     Family History: No Family History items are recorded  in the problem list.      Social History:   Social Substance History:  ·  Smoking Status former smoker (1)            Vitals and Measurements:   Vitals: Temp: 36.3  HR: 75  RR: 16  BP: 106/72  SPO2%:   94   Measurements: HT(cm): 178.2  WT(kg): 102.3  BSA:  2.25  BMI:  32.2   Last 3 Weights & Heights: Date:                           Weight/Scale Type:                    Height:   13-Sep-2023 12:27                102.3  kg                     178.2  cm  28-Jun-2023 08:59                104.7  kg                     178.2  cm  25-May-2023 13:19                108  kg                     178.2  cm      Physical Exam:      Constitutional: Well developed, awake/alert/oriented  x3.   Eyes: PERRL, EOMI, clear sclera.   ENMT: No external lesions.   Head/Neck: Neck with normal movement.  No mass, adenopathy  or tenderness.  No JVD. Trachea midline.   Respiratory/Thorax: Thorax symmetric. Clear to auscultation.   No wheezes, rales, rhonchi.  Mild tenderness palpation left lower anterolateral chest wall/ribs, but no mass lesion, skin lesions.   Cardiovascular: Regular, rate and rhythm. No murmur.   No rubs or gallops. Normal S1, S2.   Gastrointestinal: Nondistended.  Healed abdominal  incision.  No masses palpable.  No palpable hepatomegaly, splenomegaly.   Lower abdominal pain on palpation   Musculoskeletal: Mild joint deformities.   Extremities: Unremarkable extremities; no cyanosis,   No edema.   Neurological: Alert and oriented x3.   Lymphatic: No palpably significant lymphadenopathy  in the neck, supraclavicular areas.   Psychological: Appropriate mood and behavior.   Skin: Warm and dry, no rashes, no suspicious lesions.         Lab Results:         ·  Results         PET scan , 10/11/24    Interval worsening of  FDG avid disease in abdomen and pelvis as  well as newly developed FDG avid bilateral inguinal nodes as  described above.  2. Stable FDG avid liver metastases in the right hepatic lobe  3. Interval increase in FDG uptake in soft tissue nodule along right  aspect of the abdominal wall hernia.             Assessment and Plan:      Assessment and Plan:   Assessment:    1.  High-grade serous carcinoma right ovary/fallopian tube/, status postpelvic mass BSO, omentectomy, pelvic washings, FIGO stage IIa (pT2a pN0 cM0).  Status post  6 cycles adjuvant chemotherapy with paclitaxel/carboplatin.  Had significant musculoskeletal pain with Taxol infusions.  Ca-125 level elevated at 40.9 (previously normal).  CT shows evidence of progression.  Symptomatic. s/p 4 cycles carboplatin/gemcitabine  October 13, 2022 CAT scan abdominal pelvis did show omental mass with decrease in size, patient is receiving only grade 1 chemotherapy including carboplatin and gemcitabine because of pancytopenia  11/11/22: Last dose of carboplatin and gemcitabine  2/11/23: CT of CAP: Chest- stable disease, no evidence of progression of disease, peritoneal nodule measuring 3 cm which is stable.  5/19/2023: : 11.1.  June 6, 2023, CT of  abdominal pelvis, peritoneal deposit slightly increased in size     9/09/23 , PET , 1. FDG avid soft tissue implants in the left aspect of the pelvis, similar in size to abdominopelvic CT dated 05/30/2023, and remain concerning for metastatic disease. 2. Newly developed FDG avid soft tissue in the right aspect of the  pelvis may reflect a metastatic lymph node versus metastatic implant.   1/4/24 , CT of A/P , stable   6/7/24 , PET scan stable, still have persistent disease  10/11/24 ,  Interval worsening of FDG avid disease in abdomen and pelvis as well as newly developed FDG avid bilateral inguinal nodes as described above. 2. Stable FDG avid liver metastases in the right hepatic lobe 3. Interval increase in FDG  uptake in soft tissue nodule along right aspect of the abdominal wall hernia.    2.  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      3.  Multiple medical problems.      4.  Abdominal pain, likely secondary to malignancy patient not under control     5.  History small right lung nodules of uncertain significance.     6.  Left lateral chest wall pain, intermittent, likely benign, rule out malignancy..     Plan: 10/17/24  Patient has progressive ovarian cancer which is documented by recent PET scan.  PET scan result discussed with patient and family in detail.  In this situation we can offer palliative chemotherapy including Doxil Avastin or immunotherapy.    Patient is not interested to start systemic therapy right now we provided basic information about chemotherapy and immunotherapy.  Patient will think about and call us within 1 to 2 weeks for her final decision.  Abdominal pain is more worse due to metastatic disease not well-controlled on the current pain regimen which is MS Contin 60 mg p.o. every 12 and oxycodone 10 mg every 6 hours as needed.    I will increase morphine 60 mg every 8 hours and oxycodone 10 mg p.o. every 4-6 hours as needed.    Patient be reevaluated after 2-week.    Patient has metastatic ovarian cancer and has lower abdominal pain and is not able to ambulate and she will benefit from a standard wheelchair.    Greater than 30 minutes time spent with patient reviewing diagnostic studies, discussing diagnosis and treatment plan and documenting in EMR.

## 2024-10-21 ENCOUNTER — TELEPHONE (OUTPATIENT)
Dept: HEMATOLOGY/ONCOLOGY | Facility: CLINIC | Age: 74
End: 2024-10-21
Payer: MEDICARE

## 2024-10-21 NOTE — TELEPHONE ENCOUNTER
Tiffanie stacy has questions about her medications and their decision about continuing treatment.

## 2024-10-21 NOTE — TELEPHONE ENCOUNTER
Spoke with Tiffanie who was inquiring if a new prescription would be sent for changes Dr. Weiss made in Antoine's home medications or if she should increase frequency using what she has at home and then call when she runs out. Informed Tiffanie that new prescriptions for morphine 60 mg every 8 hours and oxycodone 10 mg every 4 hour as needed were sent to pharmacy on 10/17/2024, however pt can use what she has at home first since dose did not change, just frequency. Tiffanie voiced understanding. She also stated that Antoine has decided not to pursue any further treatment at this time. Tiffanie wanted to know if she should keep 10/30/2024 appointment with Dr. Weiss which was scheduled to discuss decision. Informed Tiffanie it is up to pt and family. Explained if pt is not wanting hospice at this time would be good to keep appointment to re-evaluate how pt is responding to change in pain regimen. Briefly discussed role of hospice. Tiffanie decided it is best to keep scheduled [facility] and can discuss in depth hospice vs palliative care and decide what pt wishes to do at that time. She denied additional questions at this time.

## 2024-10-22 ENCOUNTER — TELEMEDICINE (OUTPATIENT)
Dept: NEUROLOGY | Facility: HOSPITAL | Age: 74
End: 2024-10-22
Payer: MEDICARE

## 2024-10-22 DIAGNOSIS — Q21.12 PFO (PATENT FORAMEN OVALE) (HHS-HCC): ICD-10-CM

## 2024-10-22 DIAGNOSIS — D68.59 ISCHEMIC CEREBROVASCULAR ACCIDENT (CVA) DUE TO HYPERCOAGULABLE STATE (MULTI): ICD-10-CM

## 2024-10-22 DIAGNOSIS — R47.01 EXPRESSIVE APHASIA: ICD-10-CM

## 2024-10-22 DIAGNOSIS — G62.0 NEUROPATHY DUE TO CHEMOTHERAPEUTIC DRUG (MULTI): Primary | ICD-10-CM

## 2024-10-22 DIAGNOSIS — T45.1X5A NEUROPATHY DUE TO CHEMOTHERAPEUTIC DRUG (MULTI): Primary | ICD-10-CM

## 2024-10-22 DIAGNOSIS — I63.9 ISCHEMIC CEREBROVASCULAR ACCIDENT (CVA) DUE TO HYPERCOAGULABLE STATE (MULTI): ICD-10-CM

## 2024-10-22 PROCEDURE — 1157F ADVNC CARE PLAN IN RCRD: CPT | Performed by: NURSE PRACTITIONER

## 2024-10-22 PROCEDURE — 99214 OFFICE O/P EST MOD 30 MIN: CPT | Mod: 95 | Performed by: NURSE PRACTITIONER

## 2024-10-22 PROCEDURE — 1160F RVW MEDS BY RX/DR IN RCRD: CPT | Performed by: NURSE PRACTITIONER

## 2024-10-22 PROCEDURE — 99214 OFFICE O/P EST MOD 30 MIN: CPT | Performed by: NURSE PRACTITIONER

## 2024-10-22 PROCEDURE — 3062F POS MACROALBUMINURIA REV: CPT | Performed by: NURSE PRACTITIONER

## 2024-10-22 PROCEDURE — 4010F ACE/ARB THERAPY RXD/TAKEN: CPT | Performed by: NURSE PRACTITIONER

## 2024-10-22 PROCEDURE — 3048F LDL-C <100 MG/DL: CPT | Performed by: NURSE PRACTITIONER

## 2024-10-22 PROCEDURE — 3052F HG A1C>EQUAL 8.0%<EQUAL 9.0%: CPT | Performed by: NURSE PRACTITIONER

## 2024-10-22 PROCEDURE — 1159F MED LIST DOCD IN RCRD: CPT | Performed by: NURSE PRACTITIONER

## 2024-10-22 NOTE — PROGRESS NOTES
[facility]    SUBJECTIVE    Antoine Romero is a 74 y.o. right-handed female who presents with   Chief Complaint   Patient presents with    Stroke        Presents for follow up visit  Visit type: virtual visit Virtual or Telephone Consent    An interactive audio and video telecommunication system which permits real time communications between the patient (at the originating site) and provider (at the distant site) was utilized to provide this telehealth service.   Verbal consent was requested and obtained from Antoine Romero on this date, 10/22/24 for a telehealth visit.     HISTORY OF PRESENT ILLNESS    RECAP:  Initially seen as hospital follow up for stroke.   PMH: HTN, DLD, DM, Ovarian/fallopian tube cancer, s/p chemotherapy and surgery, prior recurrence of cancer with last chemo ending 2/2023, low back pain, diverticulitis, s/p laparoscopic cholecystectomy 6/2023.     Family last saw patient when she was dropped off at home on 6/30/23 about 9 pm, she was normal. Around 0900 on 7/1/23, daughter sent her a text message and her response did not make sense. Son went to check on her and found her to have trouble speaking and confusion. EMS transported to the ED. Initial BP was 135/76, HR 67, afebrile. NIHSS was 5. HCT showed concern for stoke in left frontal lobe/opercular area. Subsequent CTA head and neck without occlusion or stenosis. Pt not candidate for thrombolysis due to history of cancer, instead started on heparin drip for presumed hypercoagulable state r/t cancer and admitted to the hospital. Repeat HCT did not show any hemorrhagic transformation. Pt was previously taking 81 mg ASA and simvastatin prior to admission.      Lipid panel unremarkable. A1C 7.2%.   TTE: normal EF, normal LA size, + bubble study  MRI brain wo contrast showed acute infarct involving L frontal lobe and left subinsular region, local mass effect with effacement of sulci, no midline shift. Subcentimeter acute  "infarct within right cerebellum. Chronic atrophy, small vessel disease and remote lacunar infarcts in right basal ganglia, left thalamus and cerebellum.   Repeat NIHSS for Dr. 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 Reportedly still some cancer (peritoneal deposits per chart review) but \"numbers are stable\" and not currently on treatment.      No history of stroke or seizure in the past. No prior known clots.      Remote history of smoking in her 20s, remote history of alcohol use in her 20s. Denies illicit drug use.      7/26/23- Presents with daughter, Tiffanie, for today's follow up in clinic.      Pt feels she is doing \"ok\" since discharge home. Daughter reports that she is still more quiet than her normal, does have continued episodes of confusion and expressive aphasia. Daughter states sometimes it seems as if she is mixing two thoughts together.      SLP started coming yesterday, PT and OT as well. Tuesdays and thursdays x 6 weeks at home therapy is planned. States SLP did give her some exercises that she has already completed. Enjoys doing word searches so her daughter found her some books to continue working on them.      No issues with comprehension.     10/25/23 - Pt reports that she feels she is doing well overall in regards to stroke. Still on eliquis and aspirin, denies any bleeding issues. Feels that her speech is ok, family notes sometimes she is still mixing up words but it is mild.      States neuropathy is well controlled during the day on the gabapentin but seems to be worse at night when she is trying to sleep.      Changing PCP, Dr. Kimbrough left, will be establishing with Cristino Cruz CNP in the same office there.     Has since followed up with oncology Dr. Weiss. In june, CT of abd/pelvis showed peritoneal deposits slightly increased in size. September PET scan concerning for metastatic disease to L " pelvis as well as newly developed right pelvis mets. Per patient and family, treatment would be palliative, pt is not going to pursue it.     Cancer has recurred and progressed consistent with concerns for hypercoagulable state at time of strokes. Discussed with patient and family. Currently not pursuing the palliative chemotherapy, no curative treatment recommended.   Discussed risks of bleeding associated with long term continuation of OAC vs stopping with active cancer state and risk of repeat stroke or other vascular event, pt states understanding. Would like to continue this regimen.   Rx sent for eliquis. Continue daily statin and daily 81 mg aspirin.     10/22/24 -     Presents on call with daughter for today's visit. Cancer is progressing. Reports that this is not going well. Has upcoming visit with oncology to discuss palliative care options.     She is still taking eliquis and aspirin, tolerating well, no bleeding issues.     REVIEW OF SYSTEMS:  10 point review of systems performed and is negative except as noted in the HPI.     Past Medical History:   Diagnosis Date    Hx antineoplastic chemo     Intra-abdominal and pelvic swelling, mass and lump, unspecified site 01/20/2021    Pelvic mass in female    Other nonspecific abnormal finding of lung field 01/11/2021    Lung infiltrate on CT    Ovarian cancer (Multi)     Personal history of other diseases of the circulatory system 01/20/2021    History of hypertension    Personal history of other diseases of the musculoskeletal system and connective tissue     History of chronic back pain    Personal history of other endocrine, nutritional and metabolic disease 01/20/2021    History of hyperlipidemia    Personal history of other endocrine, nutritional and metabolic disease 01/20/2021    History of diabetes mellitus       No relevant family history has been documented for this patient.    Past Surgical History:   Procedure Laterality Date    CT GUIDED  PERCUTANEOUS BIOPSY LIVER  06/16/2023    CT GUIDED PERCUTANEOUS BIOPSY LIVER 6/16/2023 POR CT    HYSTERECTOMY      MR HEAD ANGIO WO IV CONTRAST  01/16/2023    MR HEAD ANGIO WO IV CONTRAST 1/16/2023 DOCTOR [facility]    MR NECK ANGIO WO IV CONTRAST  01/16/2023    MR NECK ANGIO WO IV CONTRAST 1/16/2023 DOCTOR OFFICE [facility]    OTHER SURGICAL HISTORY  02/04/2020    Nissen fundoplication laparoscopic    OTHER SURGICAL HISTORY  02/04/2020    Lower back surgery    OTHER SURGICAL HISTORY  02/04/2020    Hysterectomy    OTHER SURGICAL HISTORY  02/04/2020    Tonsillectomy    OTHER SURGICAL HISTORY  02/04/2020    Foot surgery    OTHER SURGICAL HISTORY  02/04/2020    Lumpectomy       Social History     Substance and Sexual Activity   Drug Use Yes    Types: Morphine, Oxycodone     Tobacco Use: Low Risk  (10/22/2024)    Patient History     Smoking Tobacco Use: Never     Smokeless Tobacco Use: Never     Passive Exposure: Not on file     Alcohol Use: Not At Risk (5/13/2024)    AUDIT-C     Frequency of Alcohol Consumption: Never     Average Number of Drinks: Patient does not drink     Frequency of Binge Drinking: Never       Current Outpatient Medications   Medication Instructions    albuterol (Ventolin HFA) 90 mcg/actuation inhaler 2 puffs, inhalation, Every 6 hours PRN    apixaban (ELIQUIS) 5 mg, oral, 2 times daily    aspirin 81 mg, Daily    atorvastatin (LIPITOR) 80 mg, oral, Nightly    bisoproloL-hydrochlorothiazide (Ziac) 2.5-6.25 mg tablet 1 tablet, oral, Daily    blood sugar diagnostic (FreeStyle Lite Strips) strip 1 strip, subcutaneous, 2 times daily    cholecalciferol (Vitamin D3) 5,000 Units tablet 2 tablets, Daily    dapagliflozin propanediol (FARXIGA) 10 mg, oral, Daily    FreeStyle glucose monitoring kit 1 each, miscellaneous, As needed    FreeStyle Lancets 28 gauge use to test blood sugar once daily    gabapentin (Neurontin) 300 mg capsule Take 1 capsule (300 mg) by mouth 2 times a day AND 2 capsules (600 mg) once  daily at bedtime.    glimepiride (AMARYL) 4 mg, oral, 2 times daily    lancets misc Patient to test once daily    lisinopril 5 mg, oral, Daily    loperamide (IMODIUM) 2 mg, oral, 4 times daily PRN    meclizine (ANTIVERT) 25 mg, Daily PRN    morphine CR (MS CONTIN) 60 mg, oral, 3 times daily, Do not crush, chew, or split.    multivitamin with minerals tablet 1 tablet, Daily    omeprazole (PRILOSEC) 40 mg, oral, Daily, Do not crush or chew.    ondansetron ODT (ZOFRAN-ODT) 4 mg, oral, Every 4 hours PRN    oxyCODONE (ROXICODONE) 10 mg, oral, Every 4 hours PRN    sennosides (senna) 8.6 mg tablet 2 tablets, Daily    tiZANidine (Zanaflex) 4 mg tablet TAKE TWO TABLETS BY MOUTH THREE TIMES A DAY AS NEEDED FOR MUSCLE SPASMS    walker (Ultra-Light Rollator) misc 1 each, miscellaneous, See admin instructions         OBJECTIVE    There were no vitals taken for this visit.      Physical Exam  Eyes:      General: Lids are normal.      Extraocular Movements: Extraocular movements intact.   Psychiatric:         Speech: Speech normal.       Neurological Exam  Mental Status  Awake, alert and oriented to person, place and time. Oriented to person, place, time and situation. Recent and remote memory are intact. Speech is normal. Language is fluent with no aphasia. Attention and concentration are normal. Fund of knowledge is appropriate for level of education.    Cranial Nerves  CN III, IV, VI: Extraocular movements intact bilaterally. Normal lids and orbits bilaterally.  CN VII: Full and symmetric facial movement.  CN VIII: Hearing is normal.    Motor   No abnormal involuntary movements.        MR brain wo IV contrast 07/02/2023    Narrative  Interpreted By:  STEPHEN WASHINGTON MD  MRN: [id number]  Patient Name: JERMAINE SOTO    STUDY:  MRI BRAIN WO;  7/2/2023 3:15 pm    INDICATION:  Aphasia .    COMPARISON:  07/01/2023    ACCESSION NUMBER(S):  [id number]    ORDERING CLINICIAN:  CORI IYER    TECHNIQUE:  Axial T2, FLAIR, DWI, gradient  echo T2 and sagittal and coronal T1  weighted images of brain were acquired.    FINDINGS:  There is diffusion restriction within the left frontal lobe and left  subinsular region compatible with an acute infarct. There is  associated T2 and FLAIR signal abnormality with local mass effect and  effacement of the sulci. No midline shift. There is a subcentimeter  focus of diffusion restriction within the right cerebellum compatible  with an acute infarct.    Ventricles, cortical sulci and basal cisterns are prominent  reflecting age related involutional changes and volume loss. There is  no extra-axial fluid collection, mass effect or midline shift.    Mild degree of patchy subcortical and periventricular T2 and FLAIR  hyperintense signal is compatible with microangiopathy. Prominent  ossification along the falx is noted.    Old lacunar infarct within the right basal ganglia, left thalamus and  bilateral cerebellum. Major intracranial flow voids at the skull base  are unremarkable. Paranasal sinuses are predominantly clear. Trace  nonspecific fluid within the right mastoid air cells and mucous  retention cyst versus polyp within the right maxillary sinus.    Cerebellar tonsils are above the foramen magnum. Pituitary and sella  are not enlarged.    No abnormal susceptibility artifact.    Impression  Acute infarct involving the left frontal lobe and left subinsular  region. There is local mass effect with effacement of the sulci. No  midline shift.    Subcentimeter acute infarct within the right cerebellum.    Volume loss and mild degree of nonspecific white matter signal  compatible with microangiopathy. Old lacunar infarcts within the  right basal ganglia, left thalamus and cerebellum.      Lab Results   Component Value Date    WBC 8.7 10/01/2024    RBC 4.59 10/01/2024    HGB 11.2 (L) 10/01/2024    HCT 37.1 10/01/2024     10/01/2024     (L) 10/01/2024    K 4.1 10/01/2024    CL 98 10/01/2024    BUN 24 (H)  10/01/2024    CREATININE 1.03 10/01/2024    EGFR 58 (L) 10/01/2024    CALCIUM 9.7 10/01/2024    ALKPHOS 57 10/01/2024    AST 14 10/01/2024    ALT 9 10/01/2024    MG 1.56 (L) 05/17/2024    [id number] 409 05/15/2024    VITD25 72 04/22/2024    HGBA1C 9.0 (H) 04/22/2024    LDLCALC 44 04/22/2024    CHOL 106 04/22/2024    HDL 38.5 04/22/2024    TRIG 116 04/22/2024    TSH 1.50 04/22/2024          ASSESSMENT & PLAN  Problem List Items Addressed This Visit       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          Ischemic cerebrovascular accident (CVA) due to hypercoagulable state (Multi)    Overview     CVA to left frontal and left subinsular region, July 2023  Subcentimeter infarct to R cerebellar region, July 2023    Bilateral infacts in both anterior and posterior circulation involved, consistent with presumed central embolic source.   CTA head and neck negative for significant large vessel stenosis or occlusion.   Recent history of cancer, concern for hypercoagulable state  Pt was started on eliquis during hospitalization, no bleeding issues.   Previously on 81 mg ASA and statin, continued  Other vascular risk factors include DM (A1C 7.2%), HTN, HLD         PFO (patent foramen ovale) ([facility])    Overview     Positive bubble study on TTE inpatient  Pt declines cardiology consult, states she would not want surgical repair if even candidate          Continue current regimen. From my standpoint can follow up on PRN basis, planning to transition to palliative care goals moving foward.     FU prn         Torie Florez, APRN-CNP

## 2024-10-25 ENCOUNTER — APPOINTMENT (OUTPATIENT)
Dept: NEUROLOGY | Facility: HOSPITAL | Age: 74
End: 2024-10-25
Payer: MEDICARE

## 2024-10-28 ENCOUNTER — TELEPHONE (OUTPATIENT)
Dept: PRIMARY CARE | Facility: CLINIC | Age: 74
End: 2024-10-28
Payer: MEDICARE

## 2024-10-28 DIAGNOSIS — I63.512 ACUTE ISCHEMIC LEFT MCA STROKE (MULTI): ICD-10-CM

## 2024-10-28 DIAGNOSIS — G62.0 NEUROPATHY DUE TO CHEMOTHERAPEUTIC DRUG (MULTI): ICD-10-CM

## 2024-10-28 DIAGNOSIS — T45.1X5A NEUROPATHY DUE TO CHEMOTHERAPEUTIC DRUG (MULTI): ICD-10-CM

## 2024-10-28 RX ORDER — GABAPENTIN 300 MG/1
CAPSULE ORAL
Qty: 360 CAPSULE | Refills: 3 | Status: SHIPPED | OUTPATIENT
Start: 2024-10-28

## 2024-10-28 RX ORDER — ATORVASTATIN CALCIUM 80 MG/1
80 TABLET, FILM COATED ORAL NIGHTLY
Qty: 90 TABLET | Refills: 3 | Status: SHIPPED | OUTPATIENT
Start: 2024-10-28

## 2024-10-30 ENCOUNTER — APPOINTMENT (OUTPATIENT)
Dept: HEMATOLOGY/ONCOLOGY | Facility: CLINIC | Age: 74
End: 2024-10-30
Payer: MEDICARE

## 2024-10-30 ENCOUNTER — OFFICE VISIT (OUTPATIENT)
Dept: HEMATOLOGY/ONCOLOGY | Facility: CLINIC | Age: 74
End: 2024-10-30
Payer: MEDICARE

## 2024-10-30 ENCOUNTER — SOCIAL WORK (OUTPATIENT)
Dept: HEMATOLOGY/ONCOLOGY | Facility: CLINIC | Age: 74
End: 2024-10-30

## 2024-10-30 ENCOUNTER — TELEPHONE (OUTPATIENT)
Dept: HEMATOLOGY/ONCOLOGY | Facility: CLINIC | Age: 74
End: 2024-10-30

## 2024-10-30 VITALS
RESPIRATION RATE: 16 BRPM | HEIGHT: 72 IN | OXYGEN SATURATION: 93 % | SYSTOLIC BLOOD PRESSURE: 118 MMHG | DIASTOLIC BLOOD PRESSURE: 69 MMHG | BODY MASS INDEX: 31.59 KG/M2 | TEMPERATURE: 98.1 F | WEIGHT: 233.25 LBS | HEART RATE: 69 BPM

## 2024-10-30 DIAGNOSIS — C56.3: ICD-10-CM

## 2024-10-30 PROCEDURE — 99214 OFFICE O/P EST MOD 30 MIN: CPT | Performed by: INTERNAL MEDICINE

## 2024-10-30 PROCEDURE — 3052F HG A1C>EQUAL 8.0%<EQUAL 9.0%: CPT | Performed by: INTERNAL MEDICINE

## 2024-10-30 PROCEDURE — 3062F POS MACROALBUMINURIA REV: CPT | Performed by: INTERNAL MEDICINE

## 2024-10-30 PROCEDURE — 3048F LDL-C <100 MG/DL: CPT | Performed by: INTERNAL MEDICINE

## 2024-10-30 PROCEDURE — 4010F ACE/ARB THERAPY RXD/TAKEN: CPT | Performed by: INTERNAL MEDICINE

## 2024-10-30 PROCEDURE — 1157F ADVNC CARE PLAN IN RCRD: CPT | Performed by: INTERNAL MEDICINE

## 2024-10-30 PROCEDURE — 1160F RVW MEDS BY RX/DR IN RCRD: CPT | Performed by: INTERNAL MEDICINE

## 2024-10-30 PROCEDURE — 3074F SYST BP LT 130 MM HG: CPT | Performed by: INTERNAL MEDICINE

## 2024-10-30 PROCEDURE — 3008F BODY MASS INDEX DOCD: CPT | Performed by: INTERNAL MEDICINE

## 2024-10-30 PROCEDURE — 3078F DIAST BP <80 MM HG: CPT | Performed by: INTERNAL MEDICINE

## 2024-10-30 PROCEDURE — 1125F AMNT PAIN NOTED PAIN PRSNT: CPT | Performed by: INTERNAL MEDICINE

## 2024-10-30 PROCEDURE — 1159F MED LIST DOCD IN RCRD: CPT | Performed by: INTERNAL MEDICINE

## 2024-10-30 ASSESSMENT — PAIN SCALES - GENERAL: PAINLEVEL_OUTOF10: 5

## 2024-11-04 ENCOUNTER — TELEPHONE (OUTPATIENT)
Dept: HEMATOLOGY/ONCOLOGY | Facility: CLINIC | Age: 74
End: 2024-11-04
Payer: MEDICARE

## 2024-11-04 DIAGNOSIS — G89.3 CHRONIC PAIN DUE TO NEOPLASM: ICD-10-CM

## 2024-11-04 DIAGNOSIS — C56.3: Primary | ICD-10-CM

## 2024-11-04 RX ORDER — OXYCODONE HYDROCHLORIDE 15 MG/1
15 TABLET ORAL EVERY 4 HOURS PRN
Qty: 120 TABLET | Refills: 0 | Status: SHIPPED | OUTPATIENT
Start: 2024-11-04 | End: 2024-11-08 | Stop reason: SDUPTHER

## 2024-11-04 NOTE — TELEPHONE ENCOUNTER
Spoke with Tiffanie who stated Antoine was up all night c/o abdominal and back pain. She took the morphine and oxycodone as prescribed but pain reoccurs 1 1/2 hr after medication. 10/10 pain  She would like something stronger for pain

## 2024-11-04 NOTE — TELEPHONE ENCOUNTER
Spoke with Rosalia pharmacist at [facility] to clarify the days supply which were not listed. What was prescribed is a 20day supply or did dr. Weiss want it to be 30 days.  Informed Rosalia the prescription per dr. Weiss is a 20day supply. Rosalia voiced understanding

## 2024-11-04 NOTE — TELEPHONE ENCOUNTER
Tiffanie called and said that her mom has bad stomach and back pain since yesterday. She is asking for something for pain. Tiffanie's number is [phone]

## 2024-11-04 NOTE — TELEPHONE ENCOUNTER
Rosalia from [facility] called with a question regarding the new Oxycodone 15mg. [phone]

## 2024-11-08 ENCOUNTER — OFFICE VISIT (OUTPATIENT)
Dept: PALLIATIVE MEDICINE | Facility: CLINIC | Age: 74
End: 2024-11-08
Payer: MEDICARE

## 2024-11-08 ENCOUNTER — APPOINTMENT (OUTPATIENT)
Dept: HEMATOLOGY/ONCOLOGY | Facility: CLINIC | Age: 74
End: 2024-11-08
Payer: MEDICARE

## 2024-11-08 VITALS
OXYGEN SATURATION: 96 % | TEMPERATURE: 98.1 F | RESPIRATION RATE: 16 BRPM | SYSTOLIC BLOOD PRESSURE: 108 MMHG | DIASTOLIC BLOOD PRESSURE: 67 MMHG | HEART RATE: 77 BPM

## 2024-11-08 DIAGNOSIS — Z79.891 ENCOUNTER FOR MONITORING OPIOID MAINTENANCE THERAPY: Primary | ICD-10-CM

## 2024-11-08 DIAGNOSIS — Z51.5 PALLIATIVE CARE ENCOUNTER: ICD-10-CM

## 2024-11-08 DIAGNOSIS — C56.3: ICD-10-CM

## 2024-11-08 DIAGNOSIS — Z51.81 ENCOUNTER FOR MONITORING OPIOID MAINTENANCE THERAPY: Primary | ICD-10-CM

## 2024-11-08 DIAGNOSIS — G89.3 CHRONIC PAIN DUE TO NEOPLASM: ICD-10-CM

## 2024-11-08 PROCEDURE — 99215 OFFICE O/P EST HI 40 MIN: CPT | Performed by: CLINICAL NURSE SPECIALIST

## 2024-11-08 PROCEDURE — 99497 ADVNCD CARE PLAN 30 MIN: CPT | Performed by: CLINICAL NURSE SPECIALIST

## 2024-11-08 RX ORDER — OXYCODONE HYDROCHLORIDE 15 MG/1
15-22.5 TABLET ORAL EVERY 2 HOUR PRN
Qty: 180 TABLET | Refills: 0 | Status: SHIPPED | OUTPATIENT
Start: 2024-11-08

## 2024-11-08 RX ORDER — MORPHINE SULFATE 30 MG/1
30 TABLET, FILM COATED, EXTENDED RELEASE ORAL 3 TIMES DAILY
Qty: 63 TABLET | Refills: 0 | Status: SHIPPED | OUTPATIENT
Start: 2024-11-08 | End: 2024-12-08

## 2024-11-08 ASSESSMENT — PAIN SCALES - GENERAL: PAINLEVEL_OUTOF10: 10-WORST PAIN EVER

## 2024-11-08 NOTE — PROGRESS NOTES
SUPPORTIVE AND PALLIATIVE ONCOLOGY CONSULT - OUTPATIENT      SERVICE DATE: 11/8/2024    Referred by:  Dr. 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 CT showed disease progression and she was treated with carbo/gemzar. Imaging in 2023 showed soft tissue implants and concern for metastatic disease. Most recent PET scan in 2024 showed interval worsening of disease. She met with Dr. Weiss where he discussed palliative chemotherapy including Doxil Avastin or immunotherapy and at that time, she decided not to move forward with treatment so she was referred to palliative care.     Information was gathered from chart review and discussions with patient and family     Brooke Fonseca D is present for visit.           SUBJECTIVE:    Patient is present with her daughter. She states that her pain is just awful. She states that her pain is in her chest and across her chest. She states that she also has pain in her abdomen and pelvis. She states that the morphine does not seem to be lasting the full 8 hours. She states that she has some end dose failure and that that the strength does not seem to enough. She also feels that her oxycodone does not help her enough and is not lasting long enough. We discussed increasing her MS Contin to 90mg TID and increasing her oxycodone to 15mg 1-1.5 tablet ever 2 hours as needed.     She reports that she has been having some constipation, but that she takes Senna to help with this.     denies having any nausea, vomiting, diarrhea, anxiety, depression, anorexia, increased pain, or shortness of  breath.          GOALS OF CARE:  Discussed with patient and her daughter goals moving forward. She stats that she has tried numerous different treatments and just does not want to do treatment any more. We discussed that while we can provide symptom management in the clinic concerns that she is going to need more care than what we can provide. We discussed goals moving forward and what she is hoping for. She states that her main goal is to be comfortable. We discussed hospice care and the philosophy of hospice care. We discussed that hospice care would provide some in home care (nursing and ADL assistance, but would not be there 24/7 or every day). We discussed that hospice care would provide medications, equipment, and 24/7 on call support. They verbalized that they would like to try hospice care. We offered choice of hospice and [facility]. We discussed the risks and benefits of CPR and intubation and she elects to be a DNR CCA DNI, no icu care.     Pain Assessment:  Pain Score: 10-Worst pain ever  Location: Chest  Education:        Symptom Assessment:  ROS otherwise negative, pertinent positives documented in the HPI       Information obtained from: chart review and interview of patient  ______________________________________________________________________     Oncology History    No history exists.       Past Medical History:   Diagnosis Date    Hx antineoplastic chemo     Intra-abdominal and pelvic swelling, mass and lump, unspecified site 01/20/2021    Pelvic mass in female    Other nonspecific abnormal finding of lung field 01/11/2021    Lung infiltrate on CT    Ovarian cancer (Multi)     Personal history of other diseases of the circulatory system 01/20/2021    History of hypertension    Personal history of other diseases of the musculoskeletal system and connective tissue     History of chronic back pain    Personal history of other endocrine, nutritional and metabolic disease 01/20/2021     History of hyperlipidemia    Personal history of other endocrine, nutritional and metabolic disease 01/20/2021    History of diabetes mellitus     Past Surgical History:   Procedure Laterality Date    CT GUIDED PERCUTANEOUS BIOPSY LIVER  06/16/2023    CT GUIDED PERCUTANEOUS BIOPSY LIVER 6/16/2023 POR CT    HYSTERECTOMY      MR HEAD ANGIO WO IV CONTRAST  01/16/2023    MR HEAD ANGIO WO IV CONTRAST 1/16/2023 [facility]    MR NECK ANGIO WO IV CONTRAST  01/16/2023    MR NECK ANGIO WO IV CONTRAST 1/16/2023 [facility]    OTHER SURGICAL HISTORY  02/04/2020    Nissen fundoplication laparoscopic    OTHER SURGICAL HISTORY  02/04/2020    Lower back surgery    OTHER SURGICAL HISTORY  02/04/2020    Hysterectomy    OTHER SURGICAL HISTORY  02/04/2020    Tonsillectomy    OTHER SURGICAL HISTORY  02/04/2020    Foot surgery    OTHER SURGICAL HISTORY  02/04/2020    Lumpectomy     No family history on file.     SOCIAL HISTORY  Children, lives with her daughter Tiffanie   Social History:  reports that she has never smoked. She has never used smokeless tobacco. She reports current drug use. Drugs: Morphine and Oxycodone. She reports that she does not drink alcohol.      REVIEW OF SYSTEMS  Review of systems negative unless noted in HPI.       Objective     Palliative Performance Scale % (PPS) 60     Labs:  No results found. However, due to the size of the patient record, not all encounters were searched. Please check Results Review for a complete set of results.          Medications:   Current Outpatient Medications   Medication Instructions    albuterol (Ventolin HFA) 90 mcg/actuation inhaler 2 puffs, inhalation, Every 6 hours PRN    apixaban (ELIQUIS) 5 mg, oral, 2 times daily    aspirin 81 mg, Daily    atorvastatin (LIPITOR) 80 mg, oral, Nightly    bisoproloL-hydrochlorothiazide (Ziac) 2.5-6.25 mg tablet 1 tablet, oral, Daily    blood sugar diagnostic (FreeStyle Lite Strips) strip 1 strip, subcutaneous, 2 times daily     cholecalciferol (Vitamin D3) 5,000 Units tablet 2 tablets, Daily    dapagliflozin propanediol (FARXIGA) 10 mg, oral, Daily    FreeStyle glucose monitoring kit 1 each, miscellaneous, As needed    FreeStyle Lancets 28 gauge use to test blood sugar once daily    gabapentin (Neurontin) 300 mg capsule Take 1 capsule (300 mg) by mouth 2 times a day AND 2 capsules (600 mg) once daily at bedtime.    glimepiride (AMARYL) 4 mg, oral, 2 times daily    lancets misc Patient to test once daily    lisinopril 5 mg, oral, Daily    loperamide (IMODIUM) 2 mg, oral, 4 times daily PRN    meclizine (ANTIVERT) 25 mg, Daily PRN    morphine CR (MS CONTIN) 60 mg, oral, 3 times daily, Do not crush, chew, or split.    morphine CR (MS CONTIN) 30 mg, oral, 3 times daily, Do not crush, chew, or split. Take in addition to her 60 mg tablets for a total of 90mg TID    multivitamin with minerals tablet 1 tablet, Daily    omeprazole (PRILOSEC) 40 mg, oral, Daily, Do not crush or chew.    ondansetron ODT (ZOFRAN-ODT) 4 mg, oral, Every 4 hours PRN    oxyCODONE (ROXICODONE) 15-22.5 mg, oral, Every 2 hour PRN    sennosides (senna) 8.6 mg tablet 2 tablets, Daily    tiZANidine (Zanaflex) 4 mg tablet TAKE TWO TABLETS BY MOUTH THREE TIMES A DAY AS NEEDED FOR MUSCLE SPASMS    walker (Ultra-Light Rollator) misc 1 each, miscellaneous, See admin instructions       Allergies:   Allergies   Allergen Reactions    Sulfamethoxazole-Trimethoprim Rash, Itching and Hives    Metformin Diarrhea       PHYSICAL EXAMINATION  Vital Signs:   Vital signs reviewed  Vitals:    11/08/24 0923   BP: 108/67   Pulse: 77   Resp: 16   Temp: 36.7 °C (98.1 °F)   SpO2: 96%     Pain Score: 10-Worst pain ever         Physical Exam  Constitutional:       Appearance: Normal appearance. She is normal weight.   HENT:      Head: Normocephalic and atraumatic.      Mouth/Throat:      Mouth: Mucous membranes are dry.   Eyes:      Extraocular Movements: Extraocular movements intact.   Cardiovascular:       Comments: No signs of cardiac distress  Pulmonary:      Effort: Pulmonary effort is normal.      Comments: No signs of respiratory distress  Abdominal:      General: Abdomen is flat.      Palpations: Abdomen is soft.   Musculoskeletal:         General: Normal range of motion.   Skin:     General: Skin is warm and dry.   Neurological:      Mental Status: She is alert and oriented to person, place, and time. Mental status is at baseline.   Psychiatric:         Mood and Affect: Mood normal.         Behavior: Behavior normal.         Thought Content: Thought content normal.         Judgment: Judgment normal.         ASSESSMENT/PLAN    Pain  Pain is: cancer related pain and acute on chronic  Type: somatic and neuropathic  Pain control: sub-optimally controlled  Intolerances/previously tried: none  Personalized pain goal: able to go to [facility]  Pain Plan:  Increase Morphine CR 90mg TID   Increase oxycodone 15mg to 1-1.5 tablets Q2 PRN   Continue gabapentin 300mg am 300mg afternoon and 600mg in the PM   Continue Zanaflex 4mg PRN     Opioid Use  Medication Management:   - OARRS report reviewed with no aberrant behavior; consistent with  prescriptions/records and patient history  - .  Overdose Risk Score 60.   This has been discussed with patient.   - We will continue to closely monitor the patient for signs of prescription misuse including UDS, OARRS review and subjective reports at each visit.  - No concurrent benzodiazepine use   - I am a provider who is certified in Hospice and Palliative Medicine and have conducted a face-face visit and examination for this patient.  - Routine Urine Drug Screen is not needed and was completed on   - Controlled Substance Agreement: is not needed. Completed on: NA  - Specifically discussed that controlled substance prescriptions will only be provided by our group as outlined in the completed agreement  - Naloxone is not needed  - Red Flags: None      Constipation   At risk  for constipation related to opioids   M   Constipation Plan:  Increase senna to 1-2 tabs BID      Altered Mood  Acute anxiety and depression related to health concerns   Mood Plan:  Will work on managing pain         Introduction to Supportive and Palliative Oncology:  Introduced the role and philosophy of Supportive and Palliative oncology in the evaluation and management of symptoms during cancer treatment  Palliative care was introduced as a service for patients with serious illness to help with symptoms, assist with goals of care conversations, navigate complex decision making, improve quality of life for patients, and provide support both patients and families.  Patient seemed to appreciate the extra layer of support.      Advance Directives  Existence of Advance Directives:Unknown  Decision maker: Surrogate decision maker is her 3 children   Code Status: DNR DNI No ICU    Next Follow-Up Visit:  Return to clinic in PRN   She has been referred to [facility] hospice     Signature and billing  Thank you for allowing us to participate in the care of this patient. Recommendations will be communicated back to the consulting service by way of shared electronic medical record or face-to-face.    Medical complexity was high level due to due to complexity of problems, extensive data review, and high risk of management/treatment.  Time was spent on the following: Prep Time, Time Directly with Patient/Family/Caregiver, Documentation Time. Total time spent: 60 minutes  Time spent in goals of care and advance care planning was 30 minutes       DATA   Diagnostic tests and information reviewed for today's visit:  Most recent labs, Most recent imaging, Medications           SIGNATURE: DAMIAN Gates-CNS    Contact information:  Supportive and Palliative Oncology  Monday-Friday 8 AM-5 PM  Phone:  [phone], press option #5, then option #1.   Or Epic Secure Chat

## 2024-11-08 NOTE — PATIENT INSTRUCTIONS
Npv with supportive oncology. Antoine will go hospice.   A msg was sent to s.w. to speak with [facility]'s dtr Tiffanie

## 2024-11-08 NOTE — TELEPHONE ENCOUNTER
Spoke with Jeanne at [facility] to verify the days quantity and frequency of every 2 hrs as needed for severe pain.

## 2024-12-12 ENCOUNTER — APPOINTMENT (OUTPATIENT)
Dept: RADIOLOGY | Facility: HOSPITAL | Age: 74
End: 2024-12-12
Payer: MEDICARE

## 2024-12-17 ENCOUNTER — APPOINTMENT (OUTPATIENT)
Dept: HEMATOLOGY/ONCOLOGY | Facility: CLINIC | Age: 74
End: 2024-12-17
Payer: MEDICARE

## 2024-12-30 ENCOUNTER — APPOINTMENT (OUTPATIENT)
Dept: HEMATOLOGY/ONCOLOGY | Facility: CLINIC | Age: 74
End: 2024-12-30
Payer: MEDICARE

## 2025-01-09 ENCOUNTER — APPOINTMENT (OUTPATIENT)
Dept: PRIMARY CARE | Facility: CLINIC | Age: 75
End: 2025-01-09
Payer: COMMERCIAL